# Patient Record
Sex: MALE | Race: WHITE | URBAN - METROPOLITAN AREA
[De-identification: names, ages, dates, MRNs, and addresses within clinical notes are randomized per-mention and may not be internally consistent; named-entity substitution may affect disease eponyms.]

---

## 2017-12-18 ENCOUNTER — OUTPATIENT (OUTPATIENT)
Dept: OUTPATIENT SERVICES | Facility: HOSPITAL | Age: 27
LOS: 1 days | Discharge: HOME | End: 2017-12-18

## 2017-12-18 DIAGNOSIS — J36 PERITONSILLAR ABSCESS: ICD-10-CM

## 2018-03-11 ENCOUNTER — EMERGENCY (EMERGENCY)
Facility: HOSPITAL | Age: 28
LOS: 0 days | Discharge: HOME | End: 2018-03-11
Attending: STUDENT IN AN ORGANIZED HEALTH CARE EDUCATION/TRAINING PROGRAM

## 2018-03-11 VITALS
SYSTOLIC BLOOD PRESSURE: 140 MMHG | RESPIRATION RATE: 18 BRPM | HEIGHT: 67 IN | DIASTOLIC BLOOD PRESSURE: 85 MMHG | OXYGEN SATURATION: 99 % | WEIGHT: 315 LBS | TEMPERATURE: 97 F | HEART RATE: 81 BPM

## 2018-03-11 DIAGNOSIS — F12.10 CANNABIS ABUSE, UNCOMPLICATED: ICD-10-CM

## 2018-03-11 DIAGNOSIS — F41.9 ANXIETY DISORDER, UNSPECIFIED: ICD-10-CM

## 2018-03-11 DIAGNOSIS — F17.200 NICOTINE DEPENDENCE, UNSPECIFIED, UNCOMPLICATED: ICD-10-CM

## 2018-03-11 RX ORDER — ALPRAZOLAM 0.25 MG
0.5 TABLET ORAL ONCE
Qty: 0 | Refills: 0 | Status: DISCONTINUED | OUTPATIENT
Start: 2018-03-11 | End: 2018-03-11

## 2018-03-11 RX ADMIN — Medication 0.5 MILLIGRAM(S): at 11:53

## 2018-03-11 NOTE — ED BEHAVIORAL HEALTH ASSESSMENT NOTE - RISK ASSESSMENT
Pt denies any suicidal or homicidal ideation, plan or intent to die and denies any history of suicidal or homicidal risk behavior in the past or present.

## 2018-03-11 NOTE — ED BEHAVIORAL HEALTH ASSESSMENT NOTE - DESCRIPTION
Pt was seen and evaluated by ER MD and upon medical clearance was referred for psychiatric consultation.  Pt has been cooperative and calm since his arrival at ER up to present time.  Pt states that his mother does not like him smoking cannabis and  they had an argument over it today and called 911 and reported him suicidal.  Pt stated that "She had done this in the past. " Pt denies any severe depression, hopelessness or helplessness.  Pt currently denies any suicidal or homicidal ideation, plan or intent to die. He denies any history of suicidal or homicidal risk behavior in the past or present. 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  Pt states that after he gets his belongings, he is going to stay with his friend, since his mother always has an argument with him over smoking cannabis.  He plans to eventually live with his father.  Pt states that he is going to consider referral to [facility] for substance abuse treatment. Mother Niki Martinez tel. no. [phone], who was outside of the ER with boyfriend provided collateral information and confirmed history gathered above.  Mother confirmed that pt has long history of cannabis abuse and does not want to go for any treatment.  She reported that every time they have argument over pt's cannabis, pt becomes threatening to hurt himself but she confirms that pt has never tried to hurt himself or others.  Mother feels safe for pt to be discharged since he is not going to stay with her anymore and agreeable for pt to be referred to [facility] for further evaluation and continuing follow-up care.  Mother was informed that she could inquire about applying for "Parents in need of assistance" petition at the family court to have pt get treatment for his substance abuse if he is not compliant with [facility] referral for continuing follow-up care. Patient does not pose any substantial risk of harm to self or others at this time and is psychiatrically cleared for discharge and referred to [facility] for further evaluation and continuing follow-up care. 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  Pt currently lives with mother but after he gets his belongings from mother's house. he is going to stay with his friend and eventually with his father when he comes back from [place].

## 2018-03-11 NOTE — ED PROVIDER NOTE - OBJECTIVE STATEMENT
26 y/o M with hx of marijuana abuse presents via EMS after confrontation with his mom. Patient reports getting into an argument with his mom because he smoked marijuana inside the house. Mom called 911 because patient took knife in his hands and threatened to cut his veins. Patient denies any suicidal or homicidal ideations. No fevers, chills. No nausea, vomiting.

## 2018-03-11 NOTE — ED BEHAVIORAL HEALTH ASSESSMENT NOTE - OTHER
Pt's has arguments with mother over his cannabis use. Return to ER PRN Mother Niki Martinez argument with mother over cannabis abuse

## 2018-03-11 NOTE — ED BEHAVIORAL HEALTH ASSESSMENT NOTE - NS ED BHA ALCOHOL
DASH Diet: Care Instructions  Your Care Instructions  The DASH diet is an eating plan that can help lower your blood pressure. DASH stands for Dietary Approaches to Stop Hypertension. Hypertension is high blood pressure. The DASH diet focuses on eating foods that are high in calcium, potassium, and magnesium. These nutrients can lower blood pressure. The foods that are highest in these nutrients are fruits, vegetables, low-fat dairy products, nuts, seeds, and legumes. But taking calcium, potassium, and magnesium supplements instead of eating foods that are high in those nutrients does not have the same effect. The DASH diet also includes whole grains, fish, and poultry. The DASH diet is one of several lifestyle changes your doctor may recommend to lower your high blood pressure. Your doctor may also want you to decrease the amount of sodium in your diet. Lowering sodium while following the DASH diet can lower blood pressure even further than just the DASH diet alone. Follow-up care is a key part of your treatment and safety. Be sure to make and go to all appointments, and call your doctor if you are having problems. It's also a good idea to know your test results and keep a list of the medicines you take. How can you care for yourself at home? Following the DASH diet  · Eat 4 to 5 servings of fruit each day. A serving is 1 medium-sized piece of fruit, ½ cup chopped or canned fruit, 1/4 cup dried fruit, or 4 ounces (½ cup) of fruit juice. Choose fruit more often than fruit juice. · Eat 4 to 5 servings of vegetables each day. A serving is 1 cup of lettuce or raw leafy vegetables, ½ cup of chopped or cooked vegetables, or 4 ounces (½ cup) of vegetable juice. Choose vegetables more often than vegetable juice. · Get 2 to 3 servings of low-fat and fat-free dairy each day. A serving is 8 ounces of milk, 1 cup of yogurt, or 1 ½ ounces of cheese. · Eat 6 to 8 servings of grains each day.  A serving is 1 slice of bread, 1 ounce of dry cereal, or ½ cup of cooked rice, pasta, or cooked cereal. Try to choose whole-grain products as much as possible. · Limit lean meat, poultry, and fish to 2 servings each day. A serving is 3 ounces, about the size of a deck of cards. · Eat 4 to 5 servings of nuts, seeds, and legumes (cooked dried beans, lentils, and split peas) each week. A serving is 1/3 cup of nuts, 2 tablespoons of seeds, or ½ cup of cooked beans or peas. · Limit fats and oils to 2 to 3 servings each day. A serving is 1 teaspoon of vegetable oil or 2 tablespoons of salad dressing. · Limit sweets and added sugars to 5 servings or less a week. A serving is 1 tablespoon jelly or jam, ½ cup sorbet, or 1 cup of lemonade. · Eat less than 2,300 milligrams (mg) of sodium a day. If you limit your sodium to 1,500 mg a day, you can lower your blood pressure even more. Tips for success  · Start small. Do not try to make dramatic changes to your diet all at once. You might feel that you are missing out on your favorite foods and then be more likely to not follow the plan. Make small changes, and stick with them. Once those changes become habit, add a few more changes. · Try some of the following:  ¨ Make it a goal to eat a fruit or vegetable at every meal and at snacks. This will make it easy to get the recommended amount of fruits and vegetables each day. ¨ Try yogurt topped with fruit and nuts for a snack or healthy dessert. ¨ Add lettuce, tomato, cucumber, and onion to sandwiches. ¨ Combine a ready-made pizza crust with low-fat mozzarella cheese and lots of vegetable toppings. Try using tomatoes, squash, spinach, broccoli, carrots, cauliflower, and onions. ¨ Have a variety of cut-up vegetables with a low-fat dip as an appetizer instead of chips and dip. ¨ Sprinkle sunflower seeds or chopped almonds over salads. Or try adding chopped walnuts or almonds to cooked vegetables. ¨ Try some vegetarian meals using beans and peas. Add garbanzo or kidney beans to salads. Make burritos and tacos with mashed torres beans or black beans. Where can you learn more? Go to http://casey-mehrdad.info/. Enter V558 in the search box to learn more about \"DASH Diet: Care Instructions. \"  Current as of: March 23, 2016  Content Version: 11.1  © 8578-7393 [employer]. Care instructions adapted under license by [employer] (which disclaims liability or warranty for this information). If you have questions about a medical condition or this instruction, always ask your healthcare professional. [place] [address] any warranty or liability for your use of this information. High Cholesterol: Care Instructions  Your Care Instructions  Cholesterol is a type of fat in your blood. It is needed for many body functions, such as making new cells. Cholesterol is made by your body. It also comes from food you eat. High cholesterol means that you have too much of the fat in your blood. This raises your risk of a heart attack and stroke. LDL and HDL are part of your total cholesterol. LDL is the \"bad\" cholesterol. High LDL can raise your risk for heart disease, heart attack, and stroke. HDL is the \"good\" cholesterol. It helps clear bad cholesterol from the body. High HDL is linked with a lower risk of heart disease, heart attack, and stroke. Your cholesterol levels help your doctor find out your risk for having a heart attack or stroke. You and your doctor can talk about whether you need to lower your risk and what treatment is best for you. A heart-healthy lifestyle along with medicines can help lower your cholesterol and your risk. The way you choose to lower your risk will depend on how high your risk is for heart attack and stroke. It will also depend on how you feel about taking medicines. Follow-up care is a key part of your treatment and safety.  Be sure to make and go to all appointments, and call your doctor if you are having problems. It's also a good idea to know your test results and keep a list of the medicines you take. How can you care for yourself at home? · Eat a variety of foods every day. Good choices include fruits, vegetables, whole grains (like oatmeal), dried beans and peas, nuts and seeds, soy products (like tofu), and fat-free or low-fat dairy products. · Replace butter, margarine, and hydrogenated or partially hydrogenated oils with olive and canola oils. (Canola oil margarine without trans fat is fine.)  · Replace red meat with fish, poultry, and soy protein (like tofu). · Limit processed and packaged foods like chips, crackers, and cookies. · Bake, broil, or steam foods. Don't moore them. · Be physically active. Get at least 30 minutes of exercise on most days of the week. Walking is a good choice. You also may want to do other activities, such as running, swimming, cycling, or playing tennis or team sports. · Stay at a healthy weight or lose weight by making the changes in eating and physical activity listed above. Losing just a small amount of weight, even 5 to 10 pounds, can reduce your risk for having a heart attack or stroke. · Do not smoke. When should you call for help? Watch closely for changes in your health, and be sure to contact your doctor if:  · You need help making lifestyle changes. · You have questions about your medicine. Where can you learn more? Go to http://casey-mehrdad.info/. Enter F096 in the search box to learn more about \"High Cholesterol: Care Instructions. \"  Current as of: January 27, 2016  Content Version: 11.1  © 0642-2668 [employer]. Care instructions adapted under license by [employer] (which disclaims liability or warranty for this information).  If you have questions about a medical condition or this instruction, always ask your healthcare professional. [employer] disclaims any warranty or liability for your use of this information. High Blood Pressure: Care Instructions  Your Care Instructions  If your blood pressure is usually above 140/90, you have high blood pressure, or hypertension. That means the top number is 140 or higher or the bottom number is 90 or higher, or both. Despite what a lot of people think, high blood pressure usually doesn't cause headaches or make you feel dizzy or lightheaded. It usually has no symptoms. But it does increase your risk for heart attack, stroke, and kidney or eye damage. The higher your blood pressure, the more your risk increases. Your doctor will give you a goal for your blood pressure. Your goal will be based on your health and your age. An example of a goal is to keep your blood pressure below 140/90. Lifestyle changes, such as eating healthy and being active, are always important to help lower blood pressure. You might also take medicine to reach your blood pressure goal.  Follow-up care is a key part of your treatment and safety. Be sure to make and go to all appointments, and call your doctor if you are having problems. It's also a good idea to know your test results and keep a list of the medicines you take. How can you care for yourself at home? Medical treatment  · If you stop taking your medicine, your blood pressure will go back up. You may take one or more types of medicine to lower your blood pressure. Be safe with medicines. Take your medicine exactly as prescribed. Call your doctor if you think you are having a problem with your medicine. · Talk to your doctor before you start taking aspirin every day. Aspirin can help certain people lower their risk of a heart attack or stroke. But taking aspirin isn't right for everyone, because it can cause serious bleeding. · See your doctor regularly. You may need to see the doctor more often at first or until your blood pressure comes down.   · If you are taking blood pressure medicine, talk to your doctor before you take decongestants or anti-inflammatory medicine, such as ibuprofen. Some of these medicines can raise blood pressure. · Learn how to check your blood pressure at home. Lifestyle changes  · Stay at a healthy weight. This is especially important if you put on weight around the waist. Losing even 10 pounds can help you lower your blood pressure. · If your doctor recommends it, get more exercise. Walking is a good choice. Bit by bit, increase the amount you walk every day. Try for at least 30 minutes on most days of the week. You also may want to swim, bike, or do other activities. · Avoid or limit alcohol. Talk to your doctor about whether you can drink any alcohol. · Try to limit how much sodium you eat to less than 2,300 milligrams (mg) a day. Your doctor may ask you to try to eat less than 1,500 mg a day. · Eat plenty of fruits (such as bananas and oranges), vegetables, legumes, whole grains, and low-fat dairy products. · Lower the amount of saturated fat in your diet. Saturated fat is found in animal products such as milk, cheese, and meat. Limiting these foods may help you lose weight and also lower your risk for heart disease. · Do not smoke. Smoking increases your risk for heart attack and stroke. If you need help quitting, talk to your doctor about stop-smoking programs and medicines. These can increase your chances of quitting for good. When should you call for help? Call 911 anytime you think you may need emergency care. This may mean having symptoms that suggest that your blood pressure is causing a serious heart or blood vessel problem. Your blood pressure may be over 180/110. For example, call 911 if:  · You have symptoms of a heart attack. These may include:  ¨ Chest pain or pressure, or a strange feeling in the chest.  ¨ Sweating. ¨ Shortness of breath. ¨ Nausea or vomiting.   ¨ Pain, pressure, or a strange feeling in the back, neck, jaw, or upper belly or in one or both shoulders or arms. ¨ Lightheadedness or sudden weakness. ¨ A fast or irregular heartbeat. · You have symptoms of a stroke. These may include:  ¨ Sudden numbness, tingling, weakness, or loss of movement in your face, arm, or leg, especially on only one side of your body. ¨ Sudden vision changes. ¨ Sudden trouble speaking. ¨ Sudden confusion or trouble understanding simple statements. ¨ Sudden problems with walking or balance. ¨ A sudden, severe headache that is different from past headaches. · You have severe back or belly pain. Do not wait until your blood pressure comes down on its own. Get help right away. Call your doctor now or seek immediate care if:  · Your blood pressure is much higher than normal (such as 180/110 or higher), but you don't have symptoms. · You think high blood pressure is causing symptoms, such as:  ¨ Severe headache. ¨ Blurry vision. Watch closely for changes in your health, and be sure to contact your doctor if:  · Your blood pressure measures 140/90 or higher at least 2 times. That means the top number is 140 or higher or the bottom number is 90 or higher, or both. · You think you may be having side effects from your blood pressure medicine. · Your blood pressure is usually normal, but it goes above normal at least 2 times. Where can you learn more? Go to http://casey-mehrdad.info/. Enter Y651 in the search box to learn more about \"High Blood Pressure: Care Instructions. \"  Current as of: August 8, 2016  Content Version: 11.1  © 7312-8572 [employer]. Care instructions adapted under license by [employer] (which disclaims liability or warranty for this information). If you have questions about a medical condition or this instruction, always ask your healthcare professional. [place] [address] any warranty or liability for your use of this information.        Middle Ear Fluid: Care Instructions  Your Care Instructions    Fluid often builds up inside the ear during a cold or allergies. Usually the fluid drains away, but sometimes a small tube in the ear, called the eustachian tube, stays blocked for months. Symptoms of fluid buildup may include:  · Popping, ringing, or a feeling of fullness or pressure in the ear. · Trouble hearing. · Balance problems and dizziness. In most cases, you can treat yourself at home. Follow-up care is a key part of your treatment and safety. Be sure to make and go to all appointments, and call your doctor if you are having problems. It's also a good idea to know your test results and keep a list of the medicines you take. How can you care for yourself at home? · In most cases, the fluid clears up within a few months without treatment. You may need more tests if the fluid does not clear up after 3 months. · If your doctor prescribed antibiotics, take them as directed. Do not stop taking them just because you feel better. You need to take the full course of antibiotics. When should you call for help? Watch closely for changes in your health, and be sure to contact your doctor if:  · You have pain or a fever. · You have any new symptoms, such as hearing problems. · You do not get better as expected. Where can you learn more? Go to http://casey-mehrdad.info/. Enter B943 in the search box to learn more about \"Middle Ear Fluid: Care Instructions. \"  Current as of: July 29, 2016  Content Version: 11.1  © 2900-4206 [employer]wise[employer] Incorporated. Care instructions adapted under license by [employer] (which disclaims liability or warranty for this information). If you have questions about a medical condition or this instruction, always ask your healthcare professional. [address] any warranty or liability for your use of this information. None known

## 2018-03-11 NOTE — ED BEHAVIORAL HEALTH ASSESSMENT NOTE - HPI (INCLUDE ILLNESS QUALITY, SEVERITY, DURATION, TIMING, CONTEXT, MODIFYING FACTORS, ASSOCIATED SIGNS AND SYMPTOMS)
Patient is  28 y/o White male who denies any history of psychiatric disorder, treatment or hospitalization but with hx of cannabis  abuse since age 16 y/o consuming 1 to 3 joints per day up to present time and never went for detox or rehab.  Patient reports getting into an argument with his mother after he claims he smoked cannabis outside their house and mother smelled it through the windows opened and believed he smoked cannabis inside the house.  they had a heated argument and mother called 911 and alleged that patient took knife in his hands and threatened to cut himself.  Pt was BIBA to ER and he was subsequently referred for psychiatric consultation.

## 2018-03-11 NOTE — ED PROVIDER NOTE - MEDICAL DECISION MAKING DETAILS
I personally evaluated the patient. I reviewed the Resident’s or Physician Assistant’s note (as assigned above), and agree with the findings and plan except as documented in my note. 28yo M PMH anxiety BIBA for eval SI. Mother called EMS and [employer] for concern of SI. Pt denies SI/HI, AVH, no somatic complaints. PE: pt anxious but cooperative. Will call psych, 1:1, reassess

## 2018-03-11 NOTE — ED BEHAVIORAL HEALTH ASSESSMENT NOTE - AXIS IV
Other psychosocial and environmental problems/Problem related to social environment/Problems with primary support

## 2018-03-11 NOTE — ED BEHAVIORAL HEALTH ASSESSMENT NOTE - OTHER PAST PSYCHIATRIC HISTORY (INCLUDE DETAILS REGARDING ONSET, COURSE OF ILLNESS, INPATIENT/OUTPATIENT TREATMENT)
Pt denies any psychiatric disorder treatment or hospitalizations and admits to long history of Cannabis abuse since age 16 y/o and never went for detox or rehab.

## 2018-03-11 NOTE — ED BEHAVIORAL HEALTH ASSESSMENT NOTE - SUMMARY
Pt is a 26 y/o White male denies any history of psychiatric disorder, treatment or hospitalization but admits history of cannabis  abuse since age 18 y/o consuming 1 to 3 joints per day up to present time and never went for detox or rehab, who was brought to ER today after an argument with mother over his smoking cannabis and mother alleged that pt exhibited suicidal gesture. Pt currently admits to his cannabis abuse but denies any severe depression, hopelessness or helplessness and denies any suicidal or homicidal ideation, plan or intent to die as well as denies any history of suicidal or homicidal risk behavior in the past or present. He has reasons to live and future oriented as well as identify his father with whom he plans to live with as his support system. Pt does not pose any substantial risk of harm to self or others at this time and was psychiatrically cleared for discharged with referral to [facility] for substance abuse treatment and continuing follow-up care.

## 2018-03-11 NOTE — ED BEHAVIORAL HEALTH ASSESSMENT NOTE - PATIENT'S CHIEF COMPLAINT
Pt states, " I had an argument with my mother and she called 911 and reported me suicidal but I'm not."

## 2018-06-29 ENCOUNTER — INPATIENT (INPATIENT)
Facility: HOSPITAL | Age: 28
LOS: 2 days | Discharge: LEFT BEFORE DISCHARGE | End: 2018-07-02
Attending: INTERNAL MEDICINE | Admitting: INTERNAL MEDICINE

## 2018-06-29 VITALS
TEMPERATURE: 97 F | OXYGEN SATURATION: 99 % | WEIGHT: 179.9 LBS | HEIGHT: 71 IN | RESPIRATION RATE: 18 BRPM | DIASTOLIC BLOOD PRESSURE: 77 MMHG | HEART RATE: 88 BPM | SYSTOLIC BLOOD PRESSURE: 145 MMHG

## 2018-06-29 NOTE — ED PROVIDER NOTE - MEDICAL DECISION MAKING DETAILS
27m w requesting detox. reporting mild symptoms of withdrawal at this time. Librium given. Labs, EKG, & imaging reviewed. Patient admitted to Detox for further care and management.

## 2018-06-29 NOTE — ED PROVIDER NOTE - PHYSICAL EXAMINATION
Physical Exam  General: Awake, alert, NAD, WDWN, NCAT, no skull/facial tender, no step-offs, no raccoon/mathis, non-toxic appearing  Eyes: PERRL, EOMI, no icterus, lids and conjunctivae are normal  ENT: External inspection normal, pink/moist membranes, pharynx normal  CV: S1S2, regular rate and rhythm, no murmur/gallops/rubs, no JVD, 2+ pulses b/l, no edema/cords/homans, warm/well-perfused  Respiratory: Normal respiratory rate/effort, no respiratory distress, normal voice, speaking full sentences, lungs clear to auscultation b/l, no wheezing/rales/rhonchi, no retractions, no stridor  Abdomen: Soft abdomen, no tender/distended/guarding/rebound, no CVA tender  Musculoskeletal: FROM all 4 extremities, N/V intact, pelvis stable, no TLS spinal tender/deform/step-offs, no orville tender/deform, normal motor tone, stable gait  Neck: FROM neck, supple, no meningismus, trachea midline, no JVD, no cspine tender/step-offs  Integumentary: Color normal for race, warm and dry, no rash  Neuro: Oriented x3, CN 2-12 intact, normal motor, normal sensory, normal gait, normal cerebellar, no tremors, no clonus/rigidity/hyper-reflexia.  Psych: Oriented x3, mood normal, affect normal. No SI/HI, no AV hallucinations.

## 2018-06-29 NOTE — ED PROVIDER NOTE - OBJECTIVE STATEMENT
27m w a hx of ADHD, anxiety, depression, asthma, & EtOH abuse presents requesting Detox eval. Pt admits to drinking daily w last drink a few hours ago and no prior admit to detox. Pt denies any fall/trauma. Patient denies SI/HI, denies any self-harm attempt or OD, also reports abuse/misuse of marijuana and "yesica". Reports mild tremors. Denies seizures or AV hallucinations. Pt reports no other symptoms at this time.

## 2018-06-29 NOTE — ED PROVIDER NOTE - NS ED ROS FT
Review of Systems  Constitutional:  No fever or chills.   Eyes:  Negative.   ENMT:  No nasal congestion, discharge, or throat pain.   Cardiac:  No chest pain, palpitations, syncope, or edema.  Respiratory:  No dyspnea, wheezing, or cough.   GI:  No vomiting, diarrhea, or abdominal pain. No melena or hematochezia.  :  No dysuria or hematuria.   Musculoskeletal:  No gait abnormality, joint swelling, joint pain, or back pain.  Skin:  No skin rash, jaundice, or lesions.  Neuro:  No headache, loss of sensation, or focal weakness. Mild tremors. No AV hallucinations.

## 2018-06-29 NOTE — ED PROVIDER NOTE - CARE PLAN
Principal Discharge DX:	Alcohol abuse Principal Discharge DX:	Alcohol abuse  Assessment and plan of treatment:	27m w requesting detox. nontoxic appearing, n/v intact. no trauma. reporting mild symptoms of withdrawal at this time. Will check detox bed availability, benzodiazepines as needed, observe/re-assess.

## 2018-06-29 NOTE — ED PROVIDER NOTE - PLAN OF CARE
27m w requesting detox. nontoxic appearing, n/v intact. no trauma. reporting mild symptoms of withdrawal at this time. Will check detox bed availability, benzodiazepines as needed, observe/re-assess.

## 2018-06-30 DIAGNOSIS — F41.9 ANXIETY DISORDER, UNSPECIFIED: ICD-10-CM

## 2018-06-30 DIAGNOSIS — Z94.7 CORNEAL TRANSPLANT STATUS: Chronic | ICD-10-CM

## 2018-06-30 DIAGNOSIS — F10.10 ALCOHOL ABUSE, UNCOMPLICATED: ICD-10-CM

## 2018-06-30 LAB
ALBUMIN SERPL ELPH-MCNC: 4.7 G/DL — SIGNIFICANT CHANGE UP (ref 3.5–5.2)
ALP SERPL-CCNC: 70 U/L — SIGNIFICANT CHANGE UP (ref 30–115)
ALT FLD-CCNC: 23 U/L — SIGNIFICANT CHANGE UP (ref 0–41)
AMPHET UR-MCNC: NEGATIVE — SIGNIFICANT CHANGE UP
AMPHET UR-MCNC: NEGATIVE — SIGNIFICANT CHANGE UP
ANION GAP SERPL CALC-SCNC: 17 MMOL/L — HIGH (ref 7–14)
APAP SERPL-MCNC: <5 UG/ML — LOW (ref 10–30)
APPEARANCE UR: CLEAR — SIGNIFICANT CHANGE UP
APTT BLD: 30.5 SEC — SIGNIFICANT CHANGE UP (ref 27–39.2)
AST SERPL-CCNC: 23 U/L — SIGNIFICANT CHANGE UP (ref 0–41)
BARBITURATES UR SCN-MCNC: NEGATIVE — SIGNIFICANT CHANGE UP
BARBITURATES UR SCN-MCNC: NEGATIVE — SIGNIFICANT CHANGE UP
BASOPHILS # BLD AUTO: 0.08 K/UL — SIGNIFICANT CHANGE UP (ref 0–0.2)
BASOPHILS NFR BLD AUTO: 0.7 % — SIGNIFICANT CHANGE UP (ref 0–1)
BENZODIAZ UR-MCNC: NEGATIVE — SIGNIFICANT CHANGE UP
BENZODIAZ UR-MCNC: NEGATIVE — SIGNIFICANT CHANGE UP
BILIRUB SERPL-MCNC: 0.8 MG/DL — SIGNIFICANT CHANGE UP (ref 0.2–1.2)
BILIRUB UR-MCNC: NEGATIVE — SIGNIFICANT CHANGE UP
BUN SERPL-MCNC: 12 MG/DL — SIGNIFICANT CHANGE UP (ref 10–20)
CALCIUM SERPL-MCNC: 10 MG/DL — SIGNIFICANT CHANGE UP (ref 8.5–10.1)
CHLORIDE SERPL-SCNC: 98 MMOL/L — SIGNIFICANT CHANGE UP (ref 98–110)
CO2 SERPL-SCNC: 26 MMOL/L — SIGNIFICANT CHANGE UP (ref 17–32)
COCAINE METAB.OTHER UR-MCNC: NEGATIVE — SIGNIFICANT CHANGE UP
COCAINE METAB.OTHER UR-MCNC: NEGATIVE — SIGNIFICANT CHANGE UP
COLOR SPEC: YELLOW — SIGNIFICANT CHANGE UP
CREAT SERPL-MCNC: 1 MG/DL — SIGNIFICANT CHANGE UP (ref 0.7–1.5)
DIFF PNL FLD: NEGATIVE — SIGNIFICANT CHANGE UP
DRUG SCREEN 1, URINE RESULT: SIGNIFICANT CHANGE UP
DRUG SCREEN 1, URINE RESULT: SIGNIFICANT CHANGE UP
EOSINOPHIL # BLD AUTO: 0.35 K/UL — SIGNIFICANT CHANGE UP (ref 0–0.7)
EOSINOPHIL NFR BLD AUTO: 2.9 % — SIGNIFICANT CHANGE UP (ref 0–8)
ETHANOL SERPL-MCNC: <10 MG/DL — HIGH
GLUCOSE SERPL-MCNC: 111 MG/DL — HIGH (ref 70–99)
GLUCOSE UR QL: NEGATIVE MG/DL — SIGNIFICANT CHANGE UP
HAV IGM SER-ACNC: SIGNIFICANT CHANGE UP
HBV CORE IGM SER-ACNC: SIGNIFICANT CHANGE UP
HBV SURFACE AG SER-ACNC: SIGNIFICANT CHANGE UP
HCT VFR BLD CALC: 46.1 % — SIGNIFICANT CHANGE UP (ref 42–52)
HCV AB S/CO SERPL IA: 0.16 S/CO — SIGNIFICANT CHANGE UP
HCV AB SERPL-IMP: SIGNIFICANT CHANGE UP
HGB BLD-MCNC: 16.2 G/DL — SIGNIFICANT CHANGE UP (ref 14–18)
IMM GRANULOCYTES NFR BLD AUTO: 0.3 % — SIGNIFICANT CHANGE UP (ref 0.1–0.3)
INR BLD: 1 RATIO — SIGNIFICANT CHANGE UP (ref 0.65–1.3)
KETONES UR-MCNC: NEGATIVE — SIGNIFICANT CHANGE UP
LEUKOCYTE ESTERASE UR-ACNC: NEGATIVE — SIGNIFICANT CHANGE UP
LYMPHOCYTES # BLD AUTO: 2.75 K/UL — SIGNIFICANT CHANGE UP (ref 1.2–3.4)
LYMPHOCYTES # BLD AUTO: 23.1 % — SIGNIFICANT CHANGE UP (ref 20.5–51.1)
MAGNESIUM SERPL-MCNC: 2.2 MG/DL — SIGNIFICANT CHANGE UP (ref 1.8–2.4)
MCHC RBC-ENTMCNC: 31.2 PG — HIGH (ref 27–31)
MCHC RBC-ENTMCNC: 35.1 G/DL — SIGNIFICANT CHANGE UP (ref 32–37)
MCV RBC AUTO: 88.8 FL — SIGNIFICANT CHANGE UP (ref 80–94)
METHADONE UR-MCNC: NEGATIVE — SIGNIFICANT CHANGE UP
METHADONE UR-MCNC: NEGATIVE — SIGNIFICANT CHANGE UP
MONOCYTES # BLD AUTO: 1.07 K/UL — HIGH (ref 0.1–0.6)
MONOCYTES NFR BLD AUTO: 9 % — SIGNIFICANT CHANGE UP (ref 1.7–9.3)
NEUTROPHILS # BLD AUTO: 7.59 K/UL — HIGH (ref 1.4–6.5)
NEUTROPHILS NFR BLD AUTO: 64 % — SIGNIFICANT CHANGE UP (ref 42.2–75.2)
NITRITE UR-MCNC: NEGATIVE — SIGNIFICANT CHANGE UP
NRBC # BLD: 0 /100 WBCS — SIGNIFICANT CHANGE UP (ref 0–0)
OPIATES UR-MCNC: NEGATIVE — SIGNIFICANT CHANGE UP
OPIATES UR-MCNC: NEGATIVE — SIGNIFICANT CHANGE UP
PCP UR-MCNC: NEGATIVE — SIGNIFICANT CHANGE UP
PCP UR-MCNC: NEGATIVE — SIGNIFICANT CHANGE UP
PH UR: 7 — SIGNIFICANT CHANGE UP (ref 5–8)
PLATELET # BLD AUTO: 317 K/UL — SIGNIFICANT CHANGE UP (ref 130–400)
POTASSIUM SERPL-MCNC: 3.8 MMOL/L — SIGNIFICANT CHANGE UP (ref 3.5–5)
POTASSIUM SERPL-SCNC: 3.8 MMOL/L — SIGNIFICANT CHANGE UP (ref 3.5–5)
PROPOXYPHENE QUALITATIVE URINE RESULT: NEGATIVE — SIGNIFICANT CHANGE UP
PROPOXYPHENE QUALITATIVE URINE RESULT: NEGATIVE — SIGNIFICANT CHANGE UP
PROT SERPL-MCNC: 7.6 G/DL — SIGNIFICANT CHANGE UP (ref 6–8)
PROT UR-MCNC: NEGATIVE MG/DL — SIGNIFICANT CHANGE UP
PROTHROM AB SERPL-ACNC: 10.8 SEC — SIGNIFICANT CHANGE UP (ref 9.95–12.87)
RBC # BLD: 5.19 M/UL — SIGNIFICANT CHANGE UP (ref 4.7–6.1)
RBC # FLD: 12 % — SIGNIFICANT CHANGE UP (ref 11.5–14.5)
SALICYLATES SERPL-MCNC: <0.3 MG/DL — LOW (ref 4–30)
SODIUM SERPL-SCNC: 141 MMOL/L — SIGNIFICANT CHANGE UP (ref 135–146)
SP GR SPEC: 1.02 — SIGNIFICANT CHANGE UP (ref 1.01–1.03)
THC UR QL: POSITIVE
THC UR QL: POSITIVE
UROBILINOGEN FLD QL: 0.2 MG/DL — SIGNIFICANT CHANGE UP (ref 0.2–0.2)
WBC # BLD: 11.88 K/UL — HIGH (ref 4.8–10.8)
WBC # FLD AUTO: 11.88 K/UL — HIGH (ref 4.8–10.8)

## 2018-06-30 RX ORDER — NICOTINE POLACRILEX 2 MG
1 GUM BUCCAL DAILY
Qty: 0 | Refills: 0 | Status: DISCONTINUED | OUTPATIENT
Start: 2018-06-30 | End: 2018-07-02

## 2018-06-30 RX ORDER — HYDROXYZINE HCL 10 MG
50 TABLET ORAL EVERY 6 HOURS
Qty: 0 | Refills: 0 | Status: DISCONTINUED | OUTPATIENT
Start: 2018-06-30 | End: 2018-07-02

## 2018-06-30 RX ORDER — HYDROXYZINE HCL 10 MG
100 TABLET ORAL AT BEDTIME
Qty: 0 | Refills: 0 | Status: DISCONTINUED | OUTPATIENT
Start: 2018-06-30 | End: 2018-07-02

## 2018-06-30 RX ORDER — THIAMINE MONONITRATE (VIT B1) 100 MG
100 TABLET ORAL DAILY
Qty: 0 | Refills: 0 | Status: DISCONTINUED | OUTPATIENT
Start: 2018-06-30 | End: 2018-07-02

## 2018-06-30 RX ORDER — TUBERCULIN PURIFIED PROTEIN DERIVATIVE 5 [IU]/.1ML
5 INJECTION, SOLUTION INTRADERMAL ONCE
Qty: 0 | Refills: 0 | Status: COMPLETED | OUTPATIENT
Start: 2018-06-30 | End: 2018-06-30

## 2018-06-30 RX ORDER — ACETAMINOPHEN 500 MG
650 TABLET ORAL EVERY 6 HOURS
Qty: 0 | Refills: 0 | Status: DISCONTINUED | OUTPATIENT
Start: 2018-06-30 | End: 2018-07-02

## 2018-06-30 RX ORDER — MAGNESIUM HYDROXIDE 400 MG/1
30 TABLET, CHEWABLE ORAL DAILY
Qty: 0 | Refills: 0 | Status: DISCONTINUED | OUTPATIENT
Start: 2018-06-30 | End: 2018-07-02

## 2018-06-30 RX ORDER — IBUPROFEN 200 MG
400 TABLET ORAL EVERY 4 HOURS
Qty: 0 | Refills: 0 | Status: DISCONTINUED | OUTPATIENT
Start: 2018-06-30 | End: 2018-07-02

## 2018-06-30 RX ORDER — FOLIC ACID 0.8 MG
1 TABLET ORAL DAILY
Qty: 0 | Refills: 0 | Status: DISCONTINUED | OUTPATIENT
Start: 2018-06-30 | End: 2018-07-02

## 2018-06-30 RX ADMIN — Medication 1 MILLIGRAM(S): at 09:24

## 2018-06-30 RX ADMIN — Medication 1 TABLET(S): at 09:24

## 2018-06-30 RX ADMIN — Medication 1 PATCH: at 09:24

## 2018-06-30 RX ADMIN — Medication 100 MILLIGRAM(S): at 22:07

## 2018-06-30 RX ADMIN — Medication 0.1 MILLIGRAM(S): at 20:18

## 2018-06-30 RX ADMIN — TUBERCULIN PURIFIED PROTEIN DERIVATIVE 5 UNIT(S): 5 INJECTION, SOLUTION INTRADERMAL at 14:30

## 2018-06-30 RX ADMIN — Medication 100 MILLIGRAM(S): at 09:24

## 2018-06-30 RX ADMIN — Medication 25 MILLIGRAM(S): at 01:59

## 2018-06-30 NOTE — CHART NOTE - NSCHARTNOTEFT_GEN_A_CORE
PPD placed on lfa to be read 48-72hrs..... MARISELAalacios RRT PPD placed on lfa to be read 48-72hrs..... JPalacios RRT    PPD WAS READ 0MM 7/02/18  EB, RRT

## 2018-06-30 NOTE — H&P ADULT - ATTENDING COMMENTS
Patient interviewed and examined.    Chart reviewed.    PA's H&P noted and modified, as appropriate.    Case discussed on team rounds    Following is my summary of the case.    Admitted for detox: from __x__ED, ___Intake, ____Med/Surg Floor    Alcohol__x__   Opioid_____  Benzo___ Other_____    Substance amount, duration of use, last usage, and prior attempts at detox or rehabs, are outlined above in the H&P and discussed with patient.    Associated withdrawal symptoms presents.  Comorbid conditions noted. Chronic and Stable.    Past Medical Hx, Psych Hx, family Hx, Social Hx from H&P reviewed and NO changes.    Old medical record and medication Hx. Reviewed    Following items reviewed and addressed:  1. labs  2. EKG  3. Imaging from PACs module    Examination: no change from PA's exam.    Place on following protocol  _____Medically Managed  __X__Medically Supervised    Ciwa__X___Librium taper____Ativan taper___Methadone taper___ Phenobarb taper____ Suboxone Induction____MMTP____    Narcan Kit Offered    Psych Consult __X__N/A  ___Ordered    Physical Therapy  ___XN/A    ___  Ordered    Aftercare disposition to be addressed by counselors.    Estimated length of stay 3-5 days.

## 2018-06-30 NOTE — H&P ADULT - HISTORY OF PRESENT ILLNESS
· Chief Complaint: The patient is a 27y Male requesting  detox. from alcohol ,liquor 2 pints /day x 2 years, last  used  today, last  detox  4 yrs  ago clean  x 2 yrs  following, denies  other  drug abuse, no h/o ivda, h/o psych do-anxiety do  no s/h ideations, +tremors  ,no h/o seizures	    PAST MEDICAL/SURGICAL/FAMILY/SOCIAL HISTORY:    Past Medical History:  Anxiety.    ALLERGIES AND HOME MEDICATIONS:   Allergies:        Allergies:  	No Known Allergies:

## 2018-06-30 NOTE — H&P ADULT - NSHPLABSRESULTS_GEN_ALL_CORE
16.2   11.88 )-----------( 317      ( 30 Jun 2018 00:45 )             46.1   06-30    141  |  98  |  12  ----------------------------<  111<H>  3.8   |  26  |  1.0    Ca    10.0      30 Jun 2018 00:45  Mg     2.2     06-30    TPro  7.6  /  Alb  4.7  /  TBili  0.8  /  DBili  x   /  AST  23  /  ALT  23  /  AlkPhos  70  06-30

## 2018-07-01 LAB — T PALLIDUM AB TITR SER: NEGATIVE — SIGNIFICANT CHANGE UP

## 2018-07-01 RX ORDER — GABAPENTIN 400 MG/1
300 CAPSULE ORAL THREE TIMES A DAY
Qty: 0 | Refills: 0 | Status: DISCONTINUED | OUTPATIENT
Start: 2018-07-01 | End: 2018-07-02

## 2018-07-01 RX ADMIN — Medication 1 TABLET(S): at 08:40

## 2018-07-01 RX ADMIN — Medication 100 MILLIGRAM(S): at 08:40

## 2018-07-01 RX ADMIN — Medication 100 MILLIGRAM(S): at 23:22

## 2018-07-01 RX ADMIN — GABAPENTIN 300 MILLIGRAM(S): 400 CAPSULE ORAL at 20:57

## 2018-07-01 RX ADMIN — Medication 1 PATCH: at 08:41

## 2018-07-01 RX ADMIN — Medication 1 PATCH: at 08:43

## 2018-07-01 RX ADMIN — GABAPENTIN 300 MILLIGRAM(S): 400 CAPSULE ORAL at 13:10

## 2018-07-01 RX ADMIN — GABAPENTIN 300 MILLIGRAM(S): 400 CAPSULE ORAL at 08:41

## 2018-07-01 RX ADMIN — Medication 1 MILLIGRAM(S): at 08:40

## 2018-07-02 VITALS
RESPIRATION RATE: 16 BRPM | TEMPERATURE: 96 F | DIASTOLIC BLOOD PRESSURE: 89 MMHG | HEART RATE: 50 BPM | SYSTOLIC BLOOD PRESSURE: 128 MMHG

## 2018-07-02 RX ORDER — GABAPENTIN 400 MG/1
1 CAPSULE ORAL
Qty: 42 | Refills: 0 | OUTPATIENT
Start: 2018-07-02 | End: 2018-07-15

## 2018-07-02 RX ORDER — GABAPENTIN 400 MG/1
400 CAPSULE ORAL
Qty: 0 | Refills: 0 | Status: DISCONTINUED | OUTPATIENT
Start: 2018-07-02 | End: 2018-07-02

## 2018-07-02 RX ORDER — GABAPENTIN 400 MG/1
1 CAPSULE ORAL
Qty: 60 | Refills: 0
Start: 2018-07-02

## 2018-07-02 RX ADMIN — Medication 1 MILLIGRAM(S): at 08:53

## 2018-07-02 RX ADMIN — Medication 1 TABLET(S): at 08:53

## 2018-07-02 RX ADMIN — TUBERCULIN PURIFIED PROTEIN DERIVATIVE 5 UNIT(S): 5 INJECTION, SOLUTION INTRADERMAL at 09:44

## 2018-07-02 RX ADMIN — GABAPENTIN 400 MILLIGRAM(S): 400 CAPSULE ORAL at 08:53

## 2018-07-02 RX ADMIN — Medication 1 PATCH: at 08:54

## 2018-07-02 RX ADMIN — Medication 100 MILLIGRAM(S): at 08:53

## 2018-07-02 NOTE — CHART NOTE - NSCHARTNOTEFT_GEN_A_CORE
The patient was admitted to the inpt detox unit CDU, for   ETOH__x__ Opioid___  Benzo____Polysubstance _____ Dependency.    Pt was admitted from ED_x___, Intake____, Med/surg Floor_______.    Details are present in the preceding History & Physical section and follow up chart notes.  patient was evaluated on daily detox team  rounds.  Withdrawal symptoms and signs were reviewed on a daily basis, and the protocols were adjusted accordingly.    Labs and imaging results were reviewed and discussed with the patient.    All questions from the patient were addressed.  The patient was seen by the Chemical dependency counselors, and different options for after care were discussed.  The patient attended groups, meetings and 1:1 sessions with the counselors.  Narcane Kit was offered and instructions given prior to discharge.    Psychiatry consultation reviewed______, N/A__x____    Physical therapy evaluation reviewed_____, N/A__x__    Pt was given copies of labs and imaging reports, if applicable.    Prescriptions if needed, were sent through [facility] system to the pharmacy amnd are noted in the discharge instruction sheet.    After care was arranged by counselors and pt was discharged to:    Home___, Outpt. Program___, Rehab ___, Long term____ [facility] ____ [facility]____ SNF____, AMA_X__, Admin Discharge____    Principal Diagnosis: Alcohol Dependency__X__ Opioid Dependency___ Benzo Dependency____ Polysubstance Dependency____

## 2018-07-02 NOTE — CHART NOTE - NSCHARTNOTEFT_GEN_A_CORE
Subsequent Inpatient Encounter                                       Detox Unit    KIMMIE ARROYO   27y   Male      Chief Complaint:    Follow up for Alcohol  Dependency    HPI:     I reviewed previous notes. No Change, except if noted below.             Detail:_    ROS:   I reviewed with patient.  No changes from previous notes except if noted below.             Detail: _    PFSH I reviewed with patient. No changes from previous notes except if noted below.             Detail_    Medication reconciliation performed.    MEDICATIONS  (STANDING):  folic acid 1 milliGRAM(s) Oral daily  gabapentin 300 milliGRAM(s) Oral three times a day  multivitamin 1 Tablet(s) Oral daily  nicotine - 21 mG/24Hr(s) Patch 1 patch Transdermal daily  thiamine 100 milliGRAM(s) Oral daily      MEDICATIONS  (PRN):  acetaminophen   Tablet 650 milliGRAM(s) Oral every 6 hours PRN For Temp greater than 38 C (100.4 F)  aluminum hydroxide/magnesium hydroxide/simethicone Suspension 30 milliLiter(s) Oral every 4 hours PRN Dyspepsia  chlordiazePOXIDE 50 milliGRAM(s) Oral every 1 hour PRN Alcohol Withdrawal Symptoms  chlordiazePOXIDE 25 milliGRAM(s) Oral every 2 hours PRN Alcohol Withdrawal Symptoms  cloNIDine 0.1 milliGRAM(s) Oral every 8 hours PRN sbp>140  hydrOXYzine hydrochloride 50 milliGRAM(s) Oral every 6 hours PRN Anxiety  hydrOXYzine hydrochloride 100 milliGRAM(s) Oral at bedtime PRN insomnia  ibuprofen  Tablet 400 milliGRAM(s) Oral every 4 hours PRN muscle discomfort  magnesium hydroxide Suspension 30 milliLiter(s) Oral daily PRN Constipation      T(C): 35.8 (18 @ 06:00), Max: 36.6 (18 @ 00:05)  HR: 50 (18 @ 06:00) (50 - 61)  BP: 128/89 (18 @ 06:00) (125/79 - 144/75)  RR: 16 (18 @ 06:00) (16 - 16)  SpO2: --    PHYSICAL EXAM:      Constitutional: NAD, A&O x3    Eyes: PERRLA, no conjuctivitis    Neck: no lymphadenopathy    Respiratory: +air entry, no rales, no rhonchi, no wheezes    Cardiovascular: +S1 and S2, regular rate and rhythm    Gastrointestinal: +BS, soft, non-tender, not distended    Extremities:  no edema, no calf tenderness    Skin: no rashes, normal turgor            Magnesium, Serum: 2.2 mg/dL (18 @ 00:45)  Treponema Pallidum Antibody Interpretation: Negative (18 @ 00:45)  Hepatitis B Surface Antigen: Nonreact (18 @ 00:45)  Hepatitis C Virus S/CO Ratio: 0.16 S/CO (18 @ 00:45)    Hepatitis C Virus Interpretation: Nonreact (18 @ 00:45)      Urinalysis Basic - ( 2018 10:19 )    Color: Yellow / Appearance: Clear / S.020 / pH: x  Gluc: x / Ketone: Negative  / Bili: Negative / Urobili: 0.2 mg/dL   Blood: x / Protein: Negative mg/dL / Nitrite: Negative   Leuk Esterase: Negative / RBC: x / WBC x   Sq Epi: x / Non Sq Epi: x / Bacteria: x    Drug Screen 1, Urine Result: Done (18 @ 10:19)  Drug Screen 1, Urine Result: Done (18 @ 23:30)        Impression and Plan:    Primary Diagnosis:  Alcohol Dependency                                Medication: Librium CIWA Protocol    Secondary Diagnosis: Anxiety                                                 Medication: c/w Gabapentin    Tertiary Diagnosis:                                                                       Medication      Continue Detox Protocols. Use of PRNS as needed for withdrawal and comfort.    Adjustments to protocols:    Labs/ Tests reviewed.    Tests ordered:     Likely Disposition: _x__Home       ___Rehab       ___Outpatient Program    ___Self Help     _____Other    Estimated Length of stay:__3__

## 2018-07-06 DIAGNOSIS — F10.20 ALCOHOL DEPENDENCE, UNCOMPLICATED: ICD-10-CM

## 2018-07-06 DIAGNOSIS — F41.9 ANXIETY DISORDER, UNSPECIFIED: ICD-10-CM

## 2018-07-06 DIAGNOSIS — F17.200 NICOTINE DEPENDENCE, UNSPECIFIED, UNCOMPLICATED: ICD-10-CM

## 2018-07-06 DIAGNOSIS — F12.20 CANNABIS DEPENDENCE, UNCOMPLICATED: ICD-10-CM

## 2018-10-11 ENCOUNTER — EMERGENCY (EMERGENCY)
Facility: HOSPITAL | Age: 28
LOS: 0 days | Discharge: HOME | End: 2018-10-11
Attending: EMERGENCY MEDICINE | Admitting: EMERGENCY MEDICINE

## 2018-10-11 VITALS
TEMPERATURE: 97 F | OXYGEN SATURATION: 100 % | DIASTOLIC BLOOD PRESSURE: 92 MMHG | RESPIRATION RATE: 20 BRPM | HEART RATE: 79 BPM | SYSTOLIC BLOOD PRESSURE: 152 MMHG

## 2018-10-11 VITALS
DIASTOLIC BLOOD PRESSURE: 106 MMHG | OXYGEN SATURATION: 100 % | TEMPERATURE: 97 F | HEART RATE: 74 BPM | WEIGHT: 164.91 LBS | HEIGHT: 67 IN | SYSTOLIC BLOOD PRESSURE: 180 MMHG | RESPIRATION RATE: 18 BRPM

## 2018-10-11 DIAGNOSIS — M54.2 CERVICALGIA: ICD-10-CM
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
## 2018-10-11 DIAGNOSIS — Z79.899 OTHER LONG TERM (CURRENT) DRUG THERAPY: ICD-10-CM

## 2018-10-11 DIAGNOSIS — M54.5 LOW BACK PAIN: ICD-10-CM

## 2018-10-11 DIAGNOSIS — F90.9 ATTENTION-DEFICIT HYPERACTIVITY DISORDER, UNSPECIFIED TYPE: ICD-10-CM
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
## 2018-10-11 DIAGNOSIS — M54.6 PAIN IN THORACIC SPINE: ICD-10-CM

## 2018-10-11 DIAGNOSIS — Z88.0 ALLERGY STATUS TO PENICILLIN: ICD-10-CM

## 2018-10-11 DIAGNOSIS — Z94.7 CORNEAL TRANSPLANT STATUS: Chronic | ICD-10-CM

## 2018-10-11 DIAGNOSIS — J45.909 UNSPECIFIED ASTHMA, UNCOMPLICATED: ICD-10-CM

## 2018-10-11 DIAGNOSIS — Z94.7 CORNEAL TRANSPLANT STATUS: ICD-10-CM
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
## 2018-10-11 LAB
ALBUMIN SERPL ELPH-MCNC: 4.9 G/DL — SIGNIFICANT CHANGE UP (ref 3.5–5.2)
ALLERGY+IMMUNOLOGY DIAG STUDY NOTE: SIGNIFICANT CHANGE UP
ALP SERPL-CCNC: 55 U/L — SIGNIFICANT CHANGE UP (ref 30–115)
ALT FLD-CCNC: 30 U/L — SIGNIFICANT CHANGE UP (ref 0–41)
ANION GAP SERPL CALC-SCNC: 17 MMOL/L — HIGH (ref 7–14)
APPEARANCE UR: CLEAR — SIGNIFICANT CHANGE UP
APTT BLD: 29.6 SEC — SIGNIFICANT CHANGE UP (ref 27–39.2)
AST SERPL-CCNC: 28 U/L — SIGNIFICANT CHANGE UP (ref 0–41)
BASOPHILS # BLD AUTO: 0.09 K/UL — SIGNIFICANT CHANGE UP (ref 0–0.2)
BASOPHILS NFR BLD AUTO: 0.9 % — SIGNIFICANT CHANGE UP (ref 0–1)
BILIRUB SERPL-MCNC: 0.5 MG/DL — SIGNIFICANT CHANGE UP (ref 0.2–1.2)
BILIRUB UR-MCNC: NEGATIVE — SIGNIFICANT CHANGE UP
BUN SERPL-MCNC: 8 MG/DL — LOW (ref 10–20)
CALCIUM SERPL-MCNC: 9.6 MG/DL — SIGNIFICANT CHANGE UP (ref 8.5–10.1)
CHLORIDE SERPL-SCNC: 102 MMOL/L — SIGNIFICANT CHANGE UP (ref 98–110)
CO2 SERPL-SCNC: 24 MMOL/L — SIGNIFICANT CHANGE UP (ref 17–32)
COLOR SPEC: YELLOW — SIGNIFICANT CHANGE UP
CREAT SERPL-MCNC: 0.8 MG/DL — SIGNIFICANT CHANGE UP (ref 0.7–1.5)
DIFF PNL FLD: NEGATIVE — SIGNIFICANT CHANGE UP
EOSINOPHIL # BLD AUTO: 0.52 K/UL — SIGNIFICANT CHANGE UP (ref 0–0.7)
EOSINOPHIL NFR BLD AUTO: 5 % — SIGNIFICANT CHANGE UP (ref 0–8)
EPI CELLS # UR: ABNORMAL /HPF
ETHANOL SERPL-MCNC: <10 MG/DL — HIGH
GLUCOSE SERPL-MCNC: 90 MG/DL — SIGNIFICANT CHANGE UP (ref 70–99)
GLUCOSE UR QL: NEGATIVE MG/DL — SIGNIFICANT CHANGE UP
HCT VFR BLD CALC: 44.5 % — SIGNIFICANT CHANGE UP (ref 42–52)
HGB BLD-MCNC: 15.8 G/DL — SIGNIFICANT CHANGE UP (ref 14–18)
IMM GRANULOCYTES NFR BLD AUTO: 0.5 % — HIGH (ref 0.1–0.3)
INR BLD: 0.95 RATIO — SIGNIFICANT CHANGE UP (ref 0.65–1.3)
KETONES UR-MCNC: NEGATIVE — SIGNIFICANT CHANGE UP
LACTATE BLDV-MCNC: 1.5 MMOL/L — SIGNIFICANT CHANGE UP (ref 0.5–1.6)
LEUKOCYTE ESTERASE UR-ACNC: NEGATIVE — SIGNIFICANT CHANGE UP
LIDOCAIN IGE QN: 127 U/L — HIGH (ref 7–60)
LYMPHOCYTES # BLD AUTO: 2.96 K/UL — SIGNIFICANT CHANGE UP (ref 1.2–3.4)
LYMPHOCYTES # BLD AUTO: 28.3 % — SIGNIFICANT CHANGE UP (ref 20.5–51.1)
MCHC RBC-ENTMCNC: 31.7 PG — HIGH (ref 27–31)
MCHC RBC-ENTMCNC: 35.5 G/DL — SIGNIFICANT CHANGE UP (ref 32–37)
MCV RBC AUTO: 89.4 FL — SIGNIFICANT CHANGE UP (ref 80–94)
MONOCYTES # BLD AUTO: 0.74 K/UL — HIGH (ref 0.1–0.6)
MONOCYTES NFR BLD AUTO: 7.1 % — SIGNIFICANT CHANGE UP (ref 1.7–9.3)
NEUTROPHILS # BLD AUTO: 6.11 K/UL — SIGNIFICANT CHANGE UP (ref 1.4–6.5)
NEUTROPHILS NFR BLD AUTO: 58.2 % — SIGNIFICANT CHANGE UP (ref 42.2–75.2)
NITRITE UR-MCNC: NEGATIVE — SIGNIFICANT CHANGE UP
NRBC # BLD: 0 /100 WBCS — SIGNIFICANT CHANGE UP (ref 0–0)
PH UR: 8.5 — SIGNIFICANT CHANGE UP (ref 5–8)
PLATELET # BLD AUTO: 302 K/UL — SIGNIFICANT CHANGE UP (ref 130–400)
POTASSIUM SERPL-MCNC: 4 MMOL/L — SIGNIFICANT CHANGE UP (ref 3.5–5)
POTASSIUM SERPL-SCNC: 4 MMOL/L — SIGNIFICANT CHANGE UP (ref 3.5–5)
PROT SERPL-MCNC: 7.4 G/DL — SIGNIFICANT CHANGE UP (ref 6–8)
PROT UR-MCNC: ABNORMAL MG/DL
PROTHROM AB SERPL-ACNC: 10.2 SEC — SIGNIFICANT CHANGE UP (ref 9.95–12.87)
RBC # BLD: 4.98 M/UL — SIGNIFICANT CHANGE UP (ref 4.7–6.1)
RBC # FLD: 11.6 % — SIGNIFICANT CHANGE UP (ref 11.5–14.5)
SODIUM SERPL-SCNC: 143 MMOL/L — SIGNIFICANT CHANGE UP (ref 135–146)
SP GR SPEC: 1.01 — SIGNIFICANT CHANGE UP (ref 1.01–1.03)
TYPE + AB SCN PNL BLD: SIGNIFICANT CHANGE UP
UROBILINOGEN FLD QL: 0.2 MG/DL — SIGNIFICANT CHANGE UP (ref 0.2–0.2)
WBC # BLD: 10.47 K/UL — SIGNIFICANT CHANGE UP (ref 4.8–10.8)
WBC # FLD AUTO: 10.47 K/UL — SIGNIFICANT CHANGE UP (ref 4.8–10.8)
WBC UR QL: SIGNIFICANT CHANGE UP /HPF

## 2018-10-11 RX ORDER — SODIUM CHLORIDE 9 MG/ML
1000 INJECTION INTRAMUSCULAR; INTRAVENOUS; SUBCUTANEOUS ONCE
Qty: 0 | Refills: 0 | Status: COMPLETED | OUTPATIENT
Start: 2018-10-11 | End: 2018-10-11

## 2018-10-11 RX ORDER — ONDANSETRON 8 MG/1
4 TABLET, FILM COATED ORAL ONCE
Qty: 0 | Refills: 0 | Status: COMPLETED | OUTPATIENT
Start: 2018-10-11 | End: 2018-10-11

## 2018-10-11 RX ORDER — IBUPROFEN 200 MG
800 TABLET ORAL ONCE
Qty: 0 | Refills: 0 | Status: COMPLETED | OUTPATIENT
Start: 2018-10-11 | End: 2018-10-11

## 2018-10-11 RX ADMIN — ONDANSETRON 4 MILLIGRAM(S): 8 TABLET, FILM COATED ORAL at 20:04

## 2018-10-11 RX ADMIN — SODIUM CHLORIDE 1000 MILLILITER(S): 9 INJECTION INTRAMUSCULAR; INTRAVENOUS; SUBCUTANEOUS at 20:04

## 2018-10-11 RX ADMIN — Medication 800 MILLIGRAM(S): at 20:04

## 2018-10-11 NOTE — ED PROVIDER NOTE - NS ED ROS FT
Eyes:  No visual changes, eye pain or discharge.  ENMT:  No hearing changes, pain, no sore throat or runny nose, no difficulty swallowing  Cardiac:  No chest pain, SOB or edema. No chest pain with exertion.  Respiratory:  No cough or respiratory distress. No hemoptysis. No history of asthma or RAD.  GI:  No nausea, vomiting, diarrhea or abdominal pain.  :  No dysuria, frequency or burning.  MS:  No myalgia or muscle weakness. + joint pain, back pain  Neuro:  No headache or weakness.  No LOC.  Skin:  No skin rash.   Endocrine: No history of thyroid disease or diabetes.
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
## 2018-10-11 NOTE — ED PROVIDER NOTE - PHYSICAL EXAMINATION
CONSTITUTIONAL: Well-developed; well-nourished; in pain.  SKIN: warm, dry  HEAD: Normocephalic; contusion to the occipital head  EYES: PERRL, EOMI, no conjunctival erythema  ENT: No nasal discharge; airway clear.  NECK: Tenderness to palpation of the cervical spine.  CARD: S1, S2 normal; no murmurs, gallops, or rubs. Regular rate and rhythm.   RESP: No wheezes, rales or rhonchi.  ABD: soft ntnd  EXT: Normal ROM.  No clubbing, cyanosis or edema. Tenderness to palpation of the thoracic and lumbar spine.  LYMPH: No acute cervical adenopathy.  NEURO: Alert, oriented, grossly unremarkable. Motor and sensation grossly intact and equal in B/L UE + LE.  PSYCH: Cooperative, appropriate.
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
## 2018-10-12 PROBLEM — F10.10 ALCOHOL ABUSE, UNCOMPLICATED: Chronic | Status: ACTIVE | Noted: 2018-06-30

## 2018-10-12 PROBLEM — F32.9 MAJOR DEPRESSIVE DISORDER, SINGLE EPISODE, UNSPECIFIED: Chronic | Status: ACTIVE | Noted: 2018-06-30

## 2018-10-12 PROBLEM — F90.9 ATTENTION-DEFICIT HYPERACTIVITY DISORDER, UNSPECIFIED TYPE: Chronic | Status: ACTIVE | Noted: 2018-06-30

## 2018-10-12 PROBLEM — F41.9 ANXIETY DISORDER, UNSPECIFIED: Chronic | Status: ACTIVE | Noted: 2018-03-11

## 2018-10-12 PROBLEM — F41.9 ANXIETY DISORDER, UNSPECIFIED: Chronic | Status: ACTIVE | Noted: 2018-06-30

## 2018-10-12 PROBLEM — J45.909 UNSPECIFIED ASTHMA, UNCOMPLICATED: Chronic | Status: ACTIVE | Noted: 2018-06-30

## 2018-10-12 LAB
AMPHET UR-MCNC: NEGATIVE — SIGNIFICANT CHANGE UP
BARBITURATES UR SCN-MCNC: NEGATIVE — SIGNIFICANT CHANGE UP
BENZODIAZ UR-MCNC: NEGATIVE — SIGNIFICANT CHANGE UP
COCAINE METAB.OTHER UR-MCNC: NEGATIVE — SIGNIFICANT CHANGE UP
METHADONE UR-MCNC: NEGATIVE — SIGNIFICANT CHANGE UP
OPIATES UR-MCNC: NEGATIVE — SIGNIFICANT CHANGE UP
PCP SPEC-MCNC: SIGNIFICANT CHANGE UP
PROPOXYPHENE QUALITATIVE URINE RESULT: NEGATIVE — SIGNIFICANT CHANGE UP

## 2018-12-04 NOTE — ED BEHAVIORAL HEALTH ASSESSMENT NOTE - NS ED BHA COCAINE
Progress Notes by René Posey DO at 08/13/18 11:08 AM     Author:  René Posey DO Service:  (none) Author Type:  Physician     Filed:  08/13/18 11:34 AM Encounter Date:  8/13/2018 Status:  Signed     :  René Posey DO (Physician)            Last visit with RENÉ POSEY was on No match found  Last visit with [facility] was on 02/10/2016 at  8:05 AM in [facility]  Match done based on reference date of today 8/13/18[RI1.1T]  Donnell is a 2 year old male who presents to walk in care for a week of worsening sinus symptoms.  Complaining of sinus pressure, cough and purulent drainage.  Not having any wheeze, hemoptysis, orthopnea or chest pain. Denies weight loss, recent travel.  Not responding to OTC medications.      Other ROS:  Fevers:[RI1.2T]no[RI1.2M]  Tobacco Use:[RI1.2T]no[RI1.2M]    No past medical history on file.  Allergies:  Review of patient's allergies indicates no known allergies.    OBJECTIVE:  Pulse 116  Temp 98.5 °F (36.9 °C)  Resp 20  Wt 30 lb (13.6 kg)  SpO2 98%  Nontoxic in appearance, normal mental status.  Cranial nerves intact    Ears:  No canal or TM erythema  Conjunctiva- clear  Positive paranasal tenderness  Nasal Mucosa- pale, boggy, purulent rhinorhea noted    Oropharynx- moist mucus membranes, erythematous- some drainage noted  Airways patents  Neck supple, some anterior cervical lymphadenopathy noted.    No meningeal signs    Lungs- some cough, but essentially clear- no wheexing noted  CVS- S1 S2 present, no rubs murmur or gallop noted    Assesement/Plan:    Sinusitis    Discussed this with the patient.  Recommend symptomatic measures, including OTC decongestants and/or antihistamines.    @  No current outpatient prescriptions on file.       Side effects of all medications were discussed.  Patient is instructed to follow up in 2-3 days or as needed.  Patient is to go to the emergency room for any significant change or worsening.  Patient was  discharged in a stable condition and was in agreement with the plan.  No further questions.    Electronically Signed by:    René Posey DO , 8/13/2018[RI1.2T]            Revision History        User Key Date/Time User Provider Type Action    > RI1.2 08/13/18 11:34 AM René Posey DO Physician Sign     RI1.1 08/13/18 11:08 AM René Posey DO Physician     M - Manual, T - Template             None known

## 2020-06-12 ENCOUNTER — EMERGENCY (EMERGENCY)
Facility: HOSPITAL | Age: 30
LOS: 0 days | Discharge: HOME | End: 2020-06-12
Attending: EMERGENCY MEDICINE | Admitting: EMERGENCY MEDICINE
Payer: MEDICAID

## 2020-06-12 VITALS
OXYGEN SATURATION: 100 % | DIASTOLIC BLOOD PRESSURE: 100 MMHG | RESPIRATION RATE: 18 BRPM | SYSTOLIC BLOOD PRESSURE: 148 MMHG | HEART RATE: 79 BPM | TEMPERATURE: 98 F | WEIGHT: 169.98 LBS
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
## 2020-06-12 DIAGNOSIS — Z88.0 ALLERGY STATUS TO PENICILLIN: ICD-10-CM

## 2020-06-12 DIAGNOSIS — J45.909 UNSPECIFIED ASTHMA, UNCOMPLICATED: ICD-10-CM

## 2020-06-12 DIAGNOSIS — F41.8 OTHER SPECIFIED ANXIETY DISORDERS: ICD-10-CM
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
## 2020-06-12 DIAGNOSIS — M25.579 PAIN IN UNSPECIFIED ANKLE AND JOINTS OF UNSPECIFIED FOOT: ICD-10-CM

## 2020-06-12 DIAGNOSIS — Z94.7 CORNEAL TRANSPLANT STATUS: ICD-10-CM

## 2020-06-12 DIAGNOSIS — F90.9 ATTENTION-DEFICIT HYPERACTIVITY DISORDER, UNSPECIFIED TYPE: ICD-10-CM

## 2020-06-12 DIAGNOSIS — Z94.7 CORNEAL TRANSPLANT STATUS: Chronic | ICD-10-CM
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
## 2020-06-12 DIAGNOSIS — S93.401A SPRAIN OF UNSPECIFIED LIGAMENT OF RIGHT ANKLE, INITIAL ENCOUNTER: ICD-10-CM
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
## 2020-06-12 DIAGNOSIS — F17.210 NICOTINE DEPENDENCE, CIGARETTES, UNCOMPLICATED: ICD-10-CM

## 2020-06-12 PROCEDURE — 73610 X-RAY EXAM OF ANKLE: CPT | Mod: 26,RT

## 2020-06-12 PROCEDURE — 29515 APPLICATION SHORT LEG SPLINT: CPT

## 2020-06-12 PROCEDURE — 99283 EMERGENCY DEPT VISIT LOW MDM: CPT | Mod: 25

## 2020-06-12 PROCEDURE — 73630 X-RAY EXAM OF FOOT: CPT | Mod: 26,RT

## 2020-06-12 RX ORDER — IBUPROFEN 200 MG
600 TABLET ORAL ONCE
Refills: 0 | Status: COMPLETED | OUTPATIENT
Start: 2020-06-12 | End: 2020-06-12

## 2020-06-12 RX ADMIN — Medication 600 MILLIGRAM(S): at 07:01

## 2020-06-12 NOTE — ED ADULT NURSE NOTE - NSIMPLEMENTINTERV_GEN_ALL_ED
Implemented All Universal Safety Interventions:  [place] to call system. Call bell, personal items and telephone within reach. Instruct patient to call for assistance. Room bathroom lighting operational. Non-slip footwear when patient is off stretcher. Physically safe environment: no spills, clutter or unnecessary equipment. Stretcher in lowest position, wheels locked, appropriate side rails in place.

## 2020-06-12 NOTE — ED PROVIDER NOTE - CARE PROVIDER_API CALL
Renan Ontiveros  Orthopaedic Surgery  [address] [place] [address]  Phone: [phone]  Fax: [phone]  Follow Up Time: 4-6 Days

## 2020-06-12 NOTE — ED PROVIDER NOTE - NSFOLLOWUPCLINICS_GEN_ALL_ED_FT
[facility]  Orthpedic  [address], [place]   Phone: [phone]  Fax:   Follow Up Time: 4-6 Days

## 2020-06-12 NOTE — ED PROVIDER NOTE - PATIENT PORTAL LINK FT
You can access the FollowMyHealth Patient Portal offered by [facility] by registering at the following website: [email or web address]. By joining [employer]’s FollowMyHealth portal, you will also be able to view your health information using other applications (apps) compatible with our system.
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
## 2020-06-12 NOTE — ED PROVIDER NOTE - MUSCULOSKELETAL, MLM
Spine appears normal, range of motion is not limited, R LE: Hip full ROM, no bony tenderness, Knee full ROM, ankle edema to the lateral malleolus non tender no warmth no erythema sensation intact, 2 + DP and PT pulses, cap refill < 2 sec

## 2020-06-12 NOTE — ED PROVIDER NOTE - NSFOLLOWUPINSTRUCTIONS_ED_ALL_ED_FT
Ankle Sprain     An ankle sprain is a stretch or tear in one of the tough tissues (ligaments) that connect the bones in your ankle. An ankle sprain can happen when the ankle rolls outward (inversion sprain) or inward (eversion sprain).  What are the causes?  This condition is caused by rolling or twisting the ankle.  What increases the risk?  You are more likely to develop this condition if you play sports.  What are the signs or symptoms?  Symptoms of this condition include:  Pain in your ankle. Swelling. Bruising. This may happen right after you sprain your ankle or 1–2 days later.Trouble standing or walking.How is this diagnosed?  This condition is diagnosed with:  A physical exam. During the exam, your doctor will press on certain parts of your foot and ankle and try to move them in certain ways.X-ray imaging. These may be taken to see how bad the sprain is and to check for broken bones.How is this treated?  This condition may be treated with:  A brace or splint. This is used to keep the ankle from moving until it heals.An elastic bandage. This is used to support the ankle.Crutches.Pain medicine.Surgery. This may be needed if the sprain is very bad.Physical therapy. This may help to improve movement in the ankle.Follow these instructions at home:  If you have a brace or a splint:     Wear the brace or splint as told by your doctor. Remove it only as told by your doctor.Loosen the brace or splint if your toes:  Tingle. Lose feeling (become numb).Turn cold and blue.Keep the brace or splint clean.If the brace or splint is not waterproof:  Do not let it get wet.Cover it with a watertight covering when you take a bath or a shower.If you have an elastic bandage (dressing):     Remove it to shower or bathe. Try not to move your ankle much, but wiggle your toes from time to time. This helps to prevent swelling. Adjust the dressing if it feels too tight.Loosen the dressing if your foot:   Loses feeling.Tingles.Becomes cold and blue.Managing pain, stiffness, and swelling        Take over-the-counter and prescription medicines only as told by doctor.For 2–3 days, keep your ankle raised (elevated) above the level of your heart.If told, put ice on the injured area:  If you have a removable brace or splint, remove it as told by your doctor.Put ice in a plastic bag. Place a towel between your skin and the bag. Leave the ice on for 20 minutes, 2–3 times a day.General instructions     Rest your ankle.Do not use your injured leg to support your body weight until your doctor says that you can. Use crutches as told by your doctor.Do not use any products that contain nicotine or tobacco, such as cigarettes, e-cigarettes, and chewing tobacco. If you need help quitting, ask your doctor.Keep all follow-up visits as told by your doctor.Contact a doctor if:  Your bruises or swelling are quickly getting worse.Your pain does not get better after you take medicine.Get help right away if:  You cannot feel your toes or foot.Your foot or toes look blue.You have very bad pain that gets worse.Summary  An ankle sprain is a stretch or tear in one of the tough tissues (ligaments) that connect the bones in your ankle.This condition is caused by rolling or twisting the ankle.Symptoms include pain, swelling, bruising, and trouble walking.To help with pain and swelling, put ice on the injured ankle, raise your ankle above the level of your heart, and use an elastic bandage. Also, rest as told by your doctor.Keep all follow-up visits as told by your doctor. This is important.This information is not intended to replace advice given to you by your health care provider. Make sure you discuss any questions you have with your health care provider.

## 2020-06-12 NOTE — ED PROVIDER NOTE - PROGRESS NOTE DETAILS
Discussed need to follow up ortho. Discussed crutches. Discussed signs and symptoms to return to the ED for. Pt understands and agrees with discharge plan

## 2020-06-12 NOTE — ED PROVIDER NOTE - ATTENDING CONTRIBUTION TO CARE
29 yr old male here for right ankle pain. pt reports 29 yr old male here for right ankle pain. pt reports woke up with pain to right ankle. no specific injury mechanism but was using it a lot yesterday. no fever, chills, fall. on exam full rom right knee,  rom right ankle due to pain, ttp lateral mall, swelling to ankle, no 5th MT TTP. foot/ankle is warm, 2+ DP Pulse

## 2020-08-25 NOTE — ED PROVIDER NOTE - ENMT NEGATIVE STATEMENT, MLM
Beau,  [facility]  [address] [place] [address]  
Ears: no ear pain and no hearing problems.Nose: no nasal congestion and no nasal drainage.Mouth/Throat: no dysphagia, no hoarseness and no throat pain.Neck: no lumps, no pain, no stiffness and no swollen glands.

## 2021-06-13 NOTE — ED PROVIDER NOTE - CHIEF COMPLAINT
"Nadine Kilgore is a 32 y.o. female who is being evaluated via a billable video visit.      The patient has been notified of following:     \"This video visit will be conducted via a call between you and your physician/provider. We have found that certain health care needs can be provided without the need for an in-person physical exam.  This service lets us provide the care you need with a video conversation.  If a prescription is necessary we can send it directly to your pharmacy.  If lab work is needed we can place an order for that and you can then stop by our lab to have the test done at a later time.    Video visits are billed at different rates depending on your insurance coverage. Please reach out to your insurance provider with any questions.    If during the course of the call the physician/provider feels a video visit is not appropriate, you will not be charged for this service.\"    Patient has given verbal consent to a Video visit? Yes  How would you like to obtain your AVS? AVS Preference: MyChart.  If dropped by the video visit, the video invitation should be sent to: Send to e-mail at: [email or web address]  Will anyone else be joining your video visit? Yes: Daughter. How would they like to receive their invitation? Text to cell phone: cell      Video Start Time: 3:27 pm    Additional provider notes: no     SUBJECTIVE: Nadine Kilgore is a 32 y.o. female with:  Chief Complaint   Patient presents with     Positive for COVID, not feeling well    She developed headache/ fever / temp to 101 / myalgias/ decreased smell and taste 10 days ago. Had positive COVID test at  on Dec 7.  No diarrhea/ shortness of breath / cough.  She gradually improved, about 50% better but last night developed sore throat and was chilled. She felt she had temp but did not recheck.  She has some swelling right side of neck.  Hard to eat / drink.  She is very fatigued.  Also notices a sore in her mouth. Daughter also tested " The patient is a 27y Male complaining of anxiety. positive for COVID and now has some mouth sores.    She is a single mother with 3 yo daughter and no family for support.  Works in health care.    Patient Active Problem List   Diagnosis     Carpal tunnel syndrome     Moderate single current episode of major depressive disorder (H)     Bereavement, uncomplicated     Hemoptysis     Fever, unspecified fever cause     Acute non-recurrent maxillary sinusitis     Latent tuberculosis      OBJECTIVE: GENERAL: Healthy, alert and no distress  EYES: Eyes grossly normal to inspection. No discharge or erythema, or obvious scleral/conjunctival abnormalities.  RESP: No audible wheeze, cough, or visible cyanosis.  No visible retractions or increased work of breathing.    NEURO: Cranial nerves grossly intact. Mentation and speech appropriate for age.  PSYCH: Mentation appears normal, affect normal/bright, judgement and insight intact, normal speech and appearance well-groomed    Nadine was seen today for positive for covid, not feeling well.    Diagnoses and all orders for this visit:    Sore throat - Schedule drive by testing to r/o strep.  Her daughter also has mouth sores so another viral infection is possibility.    -     Rapid Strep A Screen-Throat; Future    Infection due to 2019 novel coronavirus - She is 10 days out from her positive test so could return to work next week assuming this other infection resolves.  She does want a COVID re-test and this has already been order by her primary physician.  Discussed that fatigue can linger with COVID.  Rest as much as possible.           Video-Visit Details    Type of service:  Video Visit    Video End Time (time video stopped): 3:38 pm  Originating Location (pt. Location): Home    Distant Location (provider location):  [facility]     Platform used for Video Visit: Elda Syed MD

## 2021-10-11 ENCOUNTER — INPATIENT (INPATIENT)
Facility: HOSPITAL | Age: 31
LOS: 6 days | Discharge: HOME | End: 2021-10-18
Attending: PSYCHIATRY & NEUROLOGY | Admitting: PSYCHIATRY & NEUROLOGY
Payer: MEDICAID

## 2021-10-11 VITALS
RESPIRATION RATE: 16 BRPM | SYSTOLIC BLOOD PRESSURE: 152 MMHG | OXYGEN SATURATION: 96 % | HEIGHT: 67 IN | DIASTOLIC BLOOD PRESSURE: 88 MMHG | HEART RATE: 108 BPM

## 2021-10-11 DIAGNOSIS — F19.94 OTHER PSYCHOACTIVE SUBSTANCE USE, UNSPECIFIED WITH PSYCHOACTIVE SUBSTANCE-INDUCED MOOD DISORDER: ICD-10-CM

## 2021-10-11 DIAGNOSIS — Z94.7 CORNEAL TRANSPLANT STATUS: Chronic | ICD-10-CM

## 2021-10-11 LAB
ANION GAP SERPL CALC-SCNC: 11 MMOL/L — SIGNIFICANT CHANGE UP (ref 7–14)
APAP SERPL-MCNC: <5 UG/ML — LOW (ref 10–30)
BASE EXCESS BLDV CALC-SCNC: 2.2 MMOL/L — SIGNIFICANT CHANGE UP (ref -2–3)
BASOPHILS # BLD AUTO: 0.05 K/UL — SIGNIFICANT CHANGE UP (ref 0–0.2)
BASOPHILS NFR BLD AUTO: 0.4 % — SIGNIFICANT CHANGE UP (ref 0–1)
BUN SERPL-MCNC: 16 MG/DL — SIGNIFICANT CHANGE UP (ref 10–20)
CA-I SERPL-SCNC: 1.19 MMOL/L — SIGNIFICANT CHANGE UP (ref 1.15–1.33)
CALCIUM SERPL-MCNC: 9.8 MG/DL — SIGNIFICANT CHANGE UP (ref 8.5–10.1)
CHLORIDE SERPL-SCNC: 104 MMOL/L — SIGNIFICANT CHANGE UP (ref 98–110)
CO2 SERPL-SCNC: 24 MMOL/L — SIGNIFICANT CHANGE UP (ref 17–32)
CREAT SERPL-MCNC: 0.8 MG/DL — SIGNIFICANT CHANGE UP (ref 0.7–1.5)
EOSINOPHIL # BLD AUTO: 0.17 K/UL — SIGNIFICANT CHANGE UP (ref 0–0.7)
EOSINOPHIL NFR BLD AUTO: 1.5 % — SIGNIFICANT CHANGE UP (ref 0–8)
ETHANOL SERPL-MCNC: <10 MG/DL — SIGNIFICANT CHANGE UP
GAS PNL BLDV: 137 MMOL/L — SIGNIFICANT CHANGE UP (ref 136–145)
GAS PNL BLDV: SIGNIFICANT CHANGE UP
GLUCOSE SERPL-MCNC: 98 MG/DL — SIGNIFICANT CHANGE UP (ref 70–99)
HCO3 BLDV-SCNC: 27 MMOL/L — SIGNIFICANT CHANGE UP (ref 22–29)
HCT VFR BLD CALC: 46.5 % — SIGNIFICANT CHANGE UP (ref 42–52)
HCT VFR BLDA CALC: 48 % — SIGNIFICANT CHANGE UP (ref 39–51)
HGB BLD CALC-MCNC: 16 G/DL — SIGNIFICANT CHANGE UP (ref 12.6–17.4)
HGB BLD-MCNC: 16 G/DL — SIGNIFICANT CHANGE UP (ref 14–18)
IMM GRANULOCYTES NFR BLD AUTO: 0.5 % — HIGH (ref 0.1–0.3)
LACTATE BLDV-MCNC: 1.2 MMOL/L — SIGNIFICANT CHANGE UP (ref 0.5–2)
LYMPHOCYTES # BLD AUTO: 1.66 K/UL — SIGNIFICANT CHANGE UP (ref 1.2–3.4)
LYMPHOCYTES # BLD AUTO: 14.9 % — LOW (ref 20.5–51.1)
MCHC RBC-ENTMCNC: 31.9 PG — HIGH (ref 27–31)
MCHC RBC-ENTMCNC: 34.4 G/DL — SIGNIFICANT CHANGE UP (ref 32–37)
MCV RBC AUTO: 92.8 FL — SIGNIFICANT CHANGE UP (ref 80–94)
MONOCYTES # BLD AUTO: 0.58 K/UL — SIGNIFICANT CHANGE UP (ref 0.1–0.6)
MONOCYTES NFR BLD AUTO: 5.2 % — SIGNIFICANT CHANGE UP (ref 1.7–9.3)
NEUTROPHILS # BLD AUTO: 8.6 K/UL — HIGH (ref 1.4–6.5)
NEUTROPHILS NFR BLD AUTO: 77.5 % — HIGH (ref 42.2–75.2)
NRBC # BLD: 0 /100 WBCS — SIGNIFICANT CHANGE UP (ref 0–0)
PCO2 BLDV: 43 MMHG — SIGNIFICANT CHANGE UP (ref 42–55)
PH BLDV: 7.41 — SIGNIFICANT CHANGE UP (ref 7.32–7.43)
PLATELET # BLD AUTO: 302 K/UL — SIGNIFICANT CHANGE UP (ref 130–400)
PO2 BLDV: 54 MMHG — SIGNIFICANT CHANGE UP
POTASSIUM BLDV-SCNC: 5.1 MMOL/L — SIGNIFICANT CHANGE UP (ref 3.5–5.1)
POTASSIUM SERPL-MCNC: 4.1 MMOL/L — SIGNIFICANT CHANGE UP (ref 3.5–5)
POTASSIUM SERPL-SCNC: 4.1 MMOL/L — SIGNIFICANT CHANGE UP (ref 3.5–5)
RBC # BLD: 5.01 M/UL — SIGNIFICANT CHANGE UP (ref 4.7–6.1)
RBC # FLD: 11.6 % — SIGNIFICANT CHANGE UP (ref 11.5–14.5)
SALICYLATES SERPL-MCNC: <0.3 MG/DL — LOW (ref 4–30)
SAO2 % BLDV: 89.1 % — SIGNIFICANT CHANGE UP
SARS-COV-2 RNA SPEC QL NAA+PROBE: SIGNIFICANT CHANGE UP
SODIUM SERPL-SCNC: 139 MMOL/L — SIGNIFICANT CHANGE UP (ref 135–146)
WBC # BLD: 11.12 K/UL — HIGH (ref 4.8–10.8)
WBC # FLD AUTO: 11.12 K/UL — HIGH (ref 4.8–10.8)

## 2021-10-11 PROCEDURE — 93010 ELECTROCARDIOGRAM REPORT: CPT

## 2021-10-11 PROCEDURE — 90792 PSYCH DIAG EVAL W/MED SRVCS: CPT | Mod: 95

## 2021-10-11 PROCEDURE — 99285 EMERGENCY DEPT VISIT HI MDM: CPT

## 2021-10-11 RX ADMIN — Medication 50 MILLIGRAM(S): at 21:59

## 2021-10-11 NOTE — ED PROVIDER NOTE - OBJECTIVE STATEMENT
30 y.o. male pmh of Bipolar D/O, Mood Disorder, ADHD, polysubstance usage BIBEMS for agitation and HI from home. Neighbor [id number] because she heard pt screaming at mother. As per EMS given 10mg IM Versed for agitation. As per mother pt has been admitted to [facility] 4-6 times. Is not compliant with any medications. Has not seen a psychiatrist in over 6 years outpatient. + marijuana usage. Has been removed from homeless shelters 2/2 behavior and aggressions. Most recent living situation was with mother. Mother is afraid son will kill her, no longer feels safe supporting him. As per mother recently increase + hallucinations + voices in head and responding to them + decreased po intake and + insomnia + increased shop lifting.

## 2021-10-11 NOTE — ED BEHAVIORAL HEALTH ASSESSMENT NOTE - PSYCHIATRIC ISSUES AND PLAN (INCLUDE STANDING AND PRN MEDICATION)
Will defer standing psych meds to the inpatient team, can get haldol 5 and ativan 2 prn for agitation, IM if dangerousness

## 2021-10-11 NOTE — ED BEHAVIORAL HEALTH NOTE - BEHAVIORAL HEALTH NOTE
===================  PRE-HOSPITAL COURSE  ===================  SOURCE:  RN and Secondhand EMR documentation.   DETAILS:  Patient was BIBPD from home; chief complaint of agitation.     ============  ED COURSE   ============  SOURCE:  RN and Secondhand EMR documentation.   ARRIVAL: Patient was sedated when arrived to ED; was gowned, wanded, and placed on 1:1 in private room. Patient presents with fair hygiene/grooming.   BELONGINGS: None notable.    BEHAVIOR: Patient has been agitated, yelling, and uncooperative while in ED. Patient presently denies SI/HI/AH/VH; has been yelling at hospital staff in [place]. Patient's speech of slow rate accompanied by somewhat logical thought process patient is AOx4. Patient has been resting in hospital bed and has eaten/drank.   TREATMENT:  Patient received 10mg Versed in transport, received Librium 50mg PO.   VISITORS:  Patient presently unaccompanied by social supports while in ED; mother's contact information in chart.     COVID Exposure Screen- collateral (i.e. third-party, chart review, belongings, etc; include EMS and ED staff)  1.	*Has the patient had a COVID-19 test in the last 90 days?  (  ) Yes   (  ) No   ( X) Unknown- Reason: _____  IF YES PROCEED TO QUESTION #2. IF NO OR UNKNOWN, PLEASE SKIP TO QUESTION #3.  2.	Date of test(s) and result(s): ________  3.	*Has the patient tested positive for COVID-19 antibodies? (  ) Yes   ( X) No   (  ) Unknown- Reason: _____  IF YES PROCEED TO QUESTION #4. IF NO or UNKNOWN, PLEASE SKIP TO QUESTION #5.  4.	Date of positive antibody test: ____  ____  5.	*Has the patient received 2 doses of the COVID-19 vaccine? (  ) Yes   ( X) No   (  ) Unknown- Reason: _____  IF YES PROCEED TO QUESTION #6. IF NO or UNKNOWN, PLEASE SKIP TO QUESTION #7.  6.	 Date of second dose: ________  7.	*In the past 10 days, has the patient been around anyone with a positive COVID-19 test?* (  ) Yes   ( X) No   (  ) Unknown- Reason: __  IF YES PROCEED TO QUESTION #8. IF NO or UNKNOWN, PLEASE SKIP TO QUESTION #13.  8.	Was the patient within 6 feet of them for at least 15 minutes? (  ) Yes   (  ) No   (  ) Unknown- Reason: _____  9.	Did the patient provide care for them? (  ) Yes   (  ) No   (  ) Unknown- Reason: ______  10.	Did the patient have direct physical contact with them (touched, hugged, or kissed them)? (  ) Yes   (  ) No    (  ) Unknown- Reason: __  11.	Did the patient share eating or drinking utensils with them? (  ) Yes   (  ) No    (  ) Unknown- Reason: ____  12.	Did they sneeze, cough, or somehow get respiratory droplets on the patient? (  ) Yes   (  ) No    (  ) Unknown- Reason: ______  13.	*Has the patient been out of [place] within the past 10 days?* (  ) Yes   ( X) No   (  ) Unknown- Reason: _____  IF YES PLEASE ANSWER THE FOLLOWING QUESTIONS:  14.	Which state/country did they go to? ______  15.	Were they there over 24 hours? (  ) Yes   (  ) No    (  ) Unknown- Reason: ______  16.	Date of return to [facility]: ______      ========================  FOR EACH COLLATERAL  ========================  NAME: Niki  NUMBER: [phone]  RELATIONSHIP: Mother  RELIABILITY: Reliable  COMMENTS: Was living with patient     ========================  PATIENT DEMOGRAPHICS: Patient is a 29 y/o male, temporarily domiciled in private home with mother, has been in and out of homeless shelters, unemployed, single, noncaregiver.   ========================  HPI  BASELINE FUNCTIONING: Patient at baseline able to attend to ADL's without incident; reports patient has been in and out of shelters and [facility] as of late. Patient is presently unemployed and on SSD. Patient presently not engaged in any outpatient treatment and has been reluctant to do so for some time.   DATE HPI STARTED: Past week.   DECOMPENSATION: Collateral reports patient returned to her home a week ago after being kicked out of a shelter for noncompliance; returned to stay with her since he didn't have anywhere else to go. Since he's been back with mother, he's bene increasingly agitated, talking/yelling to himself, irritable, and threatening towards her. Collateral endorses patient has not been sleeping and has been staying up all night, will go outside of the home at night and leave, unsure of where he goes however he has been yelling in the street and scaring neighbors. Collateral endorses patient today had gotten in verbal altercation with mother where he was verbally aggressive and threatening to kill her; endorses that neighbor heard this altercation and called 911. Collateral endorses she is scared for her safety and that he would not be able to return home to her; endorses past hx of OOP due to patient choking and getting physically aggressive towards her.   SUICIDALITY: Collateral endorses SI statement frequently, denies SIB, endorses SI gesture of putting knife to neck most recently appx a month ago, denies known SA.   VIOLENCE: Collateral endorses recent verbal and physical aggression; HI statements, AH/VH talking to himself when both sober and high. Endorses patient will start fights with random people outside.   SUBSTANCE:  Collateral endorses patient has been smoking marijuana, most recently today, has been drinking as well.       ========================  PAST PSYCHIATRIC HISTORY  ========================  DATE PAST PSYCHIATRIC HISTORY STARTED: Chronic  MAIN PSYCHIATRIC DIAGNOSIS: Anxiety/ Possible Bipolar, ADHD  PSYCHIATRIC HOSPITALIZATIONS: 4-6times at [facility].   PRIOR ILLNESS: Collateral endorses history of noncompliance to treatment.   SUICIDALITY: Collateral endorses past SI statements/gestures, unsure of SA.   VIOLENCE: Collateral endorses history of violence against her, choking, pushing, verbal aggression to her and others in neighborhood, AH/VH, poor impulse control.   SUBSTANCE USE: Chronic marijuana use since teenager.     ==============  OTHER HISTORY  ==============  CURRENT MEDICATION: Collateral denies.   MEDICAL HISTORY:  Hx corneal transplants.   ALLERGIES: Penicillins per chart/Dust.  LEGAL ISSUES: Former OOP against patient from mother, recent shoplifting two days ago where patient tried to steal eye drops from [employer] and was caught.   FIREARM ACCESS: Collateral unable to endorse.   SOCIAL HISTORY: Collateral endorses patient has experienced bouts of homelessness as of late.   FAMILY HISTORY: Collateral endorses history of substance abuse on father's side of family.   DEVELOPMENTAL HISTORY: Collateral endorsed past ADHD diagnosis.

## 2021-10-11 NOTE — ED BEHAVIORAL HEALTH ASSESSMENT NOTE - CURRENT ACTIVE IDEATION:
Interventional Radiology Intra-procedural Nursing Note    Patient Name: Antonio Ramirez  Medical Record Number: [id number]  Today's Date: May 24, 2018    Start Time: 856  End of procedure time: 930  Procedure: Ureteral stent placement - bilateral  Report given to: Jack ROBINS Sta 88  Time pt departs:  945  820 - pt to IR 2 via cart. Makayla NP into speak with pt. 830 - pt placed on exam table in a prone position.  Prepped with chloraprep and draped in a sterile fashion. 856 - Dr Javier into begin procedure. 911 - ureteral stent placed on left 8F x 26 CM.  Lot [id number]. Left Nephrostomy Tube exchanged 8F Lot [id number].  920 - ureteral stent placed on right 8 F x 26 cm - Lot [id number]. Right nephrostomy tube exchange 8F - Lot # [id number] 930 - procedure finish time.  Pt tolerated procedure well.  938 - Bilateral leg bag exchanged as well.   Other Notes:     Radha Anderson     None known

## 2021-10-11 NOTE — ED PROVIDER NOTE - CLINICAL SUMMARY MEDICAL DECISION MAKING FREE TEXT BOX
Diagnostic testing reviewed.  Patient is medically clear for psych evaluation. Diagnostic testing reviewed.  Patient is medically clear for psych evaluation. s/o to me from Dr. Perez- sp psych eval, will admit

## 2021-10-11 NOTE — ED BEHAVIORAL HEALTH ASSESSMENT NOTE - NSSUICRSKFACTOR_PSY_ALL_CORE
[FreeTextEntry1] : Ms. Grubbs is a 29 
Current and Past Psychiatric Diagnoses/Presenting Symptoms/Historical Factors/Treatment Related Factors

## 2021-10-11 NOTE — ED ADULT NURSE NOTE - NSICDXPASTMEDICALHX_GEN_ALL_CORE_FT
PAST MEDICAL HISTORY:  ADHD     Alcohol abuse     Anxiety     Anxiety     Asthma     Depression

## 2021-10-11 NOTE — ED PROVIDER NOTE - PHYSICAL EXAMINATION
Physical Exam    Vital Signs: I have reviewed the initial vital signs.  Constitutional: well-nourished, appears stated age, no acute distress  Eyes: Conjunctiva pink, Sclera clear, PERRLA, no ptosis, no entrapment, no racoon eyes.  Cardiovascular: S1 and S2, regular rate, regular rhythm  Respiratory: unlabored respiratory effort, speaking in full sentences, handling oral secretions, clear to auscultation bilaterally no wheezing, rales and rhonchi  Gastrointestinal: soft, non-tender abdomen, no pulsatile mass, normal bowl sounds  Musculoskeletal: supple neck, , no gross bony deformities or swelling.  Integumentary: warm, dry, no rashes, lacerations,  Neurologic: awake, no fasiculations no tremors

## 2021-10-11 NOTE — ED PROVIDER NOTE - PROGRESS NOTE DETAILS
dc: telepsych consult placed (Dr. Sneed) will evaluate. RK: received s/o from ethel bai. patient denies pain anywhere. admits to having an argument with mom prior. denies si and hi. teary and agitated screaming during exam. Will be admitted to [facility], awaiting psych bed

## 2021-10-11 NOTE — ED PROVIDER NOTE - ATTENDING CONTRIBUTION TO CARE
pt has a PMHx of BAD a/w aggressive and vioient outburst had a verbal altercations with hias mother today.  A neighbor heard the pt threateing his mother and called 911.  pt required Versed 5 mg x 2 PTA administered by EMS to control behavior.  In ED, airway intact.    VITAL SIGNS: I have reviewed nursing notes and confirm.  CONSTITUTIONAL: Well-developed; well-nourished; sedated.  SKIN: Skin exam is warm and dry, no acute rash.  HEAD: Normocephalic; atraumatic.  EYES: PERRL, injected conjunctiva and sclera clear.  NECK: Supple; non tender.  No lymphadenopathy.  CARD: S1, S2 normal; no murmurs, gallops, or rubs. Regular rate and rhythm.  RESP: No wheezes, rales or rhonchi.  ABD: Normal bowel sounds; soft; non-distended; non-tender; no hepatosplenomegaly.  EXT: Normal ROM. No clubbing, cyanosis or edema.  NEURO: Sedated.  responds slowly to verbal and tactile stimuli. pt has a PMHx of BAD a/w aggressive and violent outburst had a verbal altercations with hias mother today.  A neighbor heard the pt threatening his mother and called 911.  pt required Versed 5 mg x 2 PTA administered by EMS to control behavior.  In ED, airway intact.    VITAL SIGNS: I have reviewed nursing notes and confirm.  CONSTITUTIONAL: Well-developed; well-nourished; sedated.  SKIN: Skin exam is warm and dry, no acute rash.  HEAD: Normocephalic; atraumatic.  EYES: PERRL, injected conjunctiva and sclera clear.  NECK: Supple; non tender.  No lymphadenopathy.  CARD: S1, S2 normal; no murmurs, gallops, or rubs. Regular rate and rhythm.  RESP: No wheezes, rales or rhonchi.  ABD: Normal bowel sounds; soft; non-distended; non-tender; no hepatosplenomegaly.  EXT: Normal ROM. No clubbing, cyanosis or edema.  NEURO: Sedated.  responds slowly to verbal and tactile stimuli.

## 2021-10-11 NOTE — ED ADULT TRIAGE NOTE - CHIEF COMPLAINT QUOTE
BIBA from home as per EMS pt has an extensive psych hx and was flipping out. Neighbors called for a verbal altercation with family. Per EMS pt is non compliant with medications. Pt arrived with police custody for safe escort to ED. Pt received 10 mg of versed IM by EMS and was placed on 4l nc

## 2021-10-11 NOTE — ED BEHAVIORAL HEALTH ASSESSMENT NOTE - DETAILS
yes was bullied as a child father's side w/ substance use d/o tbd see above sedation from VPA patient denies lifetime h/o SI, per his mother he frequently threatens SI

## 2021-10-11 NOTE — ED BEHAVIORAL HEALTH ASSESSMENT NOTE - HPI (INCLUDE ILLNESS QUALITY, SEVERITY, DURATION, TIMING, CONTEXT, MODIFYING FACTORS, ASSOCIATED SIGNS AND SYMPTOMS)
Patient is a 29 yo man, single, 2 kids ( and 13 yo, not in his custody), tenuously domiciled w/ mom (was kicked out of a shelter last week for behavioral disturbances), PPH of PSA (EtOH, MJ), BAD, ADHD, ORIN, multiple prior psych admissions, most recently at [facility] a few months ago, he denies past suicide attempts, however per mom he frequently Patient is a 31 yo man, single, 2 kids ( and 11 yo, not in his custody), tenuously domiciled w/ mom (was kicked out of a shelter last week for behavioral disturbances), PPH of PSA (EtOH, MJ), BAD, ADHD, ORIN, multiple prior psych admissions, most recently at [facility] a few months ago, h/o detox/rehabs, he denies past suicide attempts, however per mom he frequently threatens suicide and least month he put a knife to his neck that she had to remove, h/o violence towards his mother, past OOP, nonadherent w/ outpt psychiatric care, h/o being bullied, PMH of corneal transplant (hasn't followed up w/ medical care for that), BIBEMS a/b neighbors after he was threatening his mother earlier today.    Per collateral from patient's mother patient has a poor functional baseline w/ frequent verbal and physical aggression, heavy substance use, poor insight into the nature of his mental illness, inability to maintain employment.  She notes that over the past few weeks however he has been off of his baseline, threatening SI, more aggressive, and more impulsive.  Last month she says in the middle of an argument he put a knife to his neck and threatened to kill himself and he had to remove it, she thinks he would have killed himself if she didn't.  She says last week he got kicked out of a shelter for agitation (would also not follow the rules of the shelter) and has been living with her.  During the past week he hasn't been sleeping, has hardly been eating, has been more impulsive (yesterday tried to steal eye drops) although has been adhering to other ADLs.  Today he was loudly yelling at his mother, threatening her, and neighbors called 911.  His mother is very concerned about him and doesn't feel it is safe for him to return home w/ her.    On interview patient has very poor insight and denies much of what his mother says about him.  He says that he plans to move to [place] however cannot say where he'll go or how he'll afford the move.  He says that he was arguing with his mother earlier today about smoking in the home but denies past physical aggression to her or previously threatening SI.  He says he was recently admitted for anxiety however hasn't been engaging in outpt care.  He is unable to engage in safety planning, and becomes very irritable when told about his mother's concerns.      On psych ROS denies changes in mood, reports chronically getting 4 hrs of sleep a night w/ good energy levels, denies change in appetite.  Reports some anxiety.  Denies SI/HI.  Denies grandiosity, impulsivity, increased goal-directed activity.  Denies ROYER DILL.    States he got the first dose of the Pfizer vaccine about a month ago, hasn't gotten the second, denies out of state travel or known COVID-19 contacts in the past 2 weeks, reports a negative COVID-19 test recently but doesn't remember when, hasn't had antibodies checked.

## 2021-10-11 NOTE — ED BEHAVIORAL HEALTH ASSESSMENT NOTE - SUMMARY
Although patient has a poor functional/psychiatric baseline w/ likely some chronic cognitive deficits, per collateral patient has been off of his baseline recently w/ recent interrupted suicide attempt, increasing threats of violence, impulsivity, poor PO and sleep, and worsening behavioral dysregulation.  He demonstrates very poor insight, is unable to engage in safety planning, and requires 2PC admission to prevent harm to others.  Ideally in the hospital patient could be set up w/ housing resources/[facility].

## 2021-10-11 NOTE — ED BEHAVIORAL HEALTH ASSESSMENT NOTE - RISK ASSESSMENT
Moderate Acute Suicide Risk Chronic risk factors include substance use, h/o violence, h/o SI, h/o being bullied.  Acute risk elevated by worsening agitation, SI, and impulsivity.  Requires 2PC admission.

## 2021-10-11 NOTE — ED BEHAVIORAL HEALTH ASSESSMENT NOTE - PAST PSYCHOTROPIC MEDICATION
Island Pedicle Flap-Requiring Vessel Identification Text: The defect edges were debeveled with a #15 scalpel blade.  Given the location of the defect, shape of the defect and the proximity to free margins an island pedicle advancement flap was deemed most appropriate.  Using a sterile surgical marker, an appropriate advancement flap was drawn, based on the axial vessel mentioned above, incorporating the defect, outlining the appropriate donor tissue and placing the expected incisions within the relaxed skin tension lines where possible.    The area thus outlined was incised deep to adipose tissue with a #15 scalpel blade.  The skin margins were undermined to an appropriate distance in all directions around the primary defect and laterally outward around the island pedicle utilizing iris scissors.  There was minimal undermining beneath the pedicle flap. reports previously being on VPA and xanax

## 2021-10-11 NOTE — ED BEHAVIORAL HEALTH ASSESSMENT NOTE - NSBHSAALC_PSY_A_CORE FT
says he started drinking at age 14, at most "pints a day", h/o w/d tremor, no past w/d seizures, last drink yesterday, BAL negative

## 2021-10-12 LAB
APPEARANCE UR: CLEAR — SIGNIFICANT CHANGE UP
BILIRUB UR-MCNC: NEGATIVE — SIGNIFICANT CHANGE UP
COLOR SPEC: YELLOW — SIGNIFICANT CHANGE UP
DIFF PNL FLD: NEGATIVE — SIGNIFICANT CHANGE UP
GLUCOSE UR QL: NEGATIVE MG/DL — SIGNIFICANT CHANGE UP
KETONES UR-MCNC: NEGATIVE — SIGNIFICANT CHANGE UP
LEUKOCYTE ESTERASE UR-ACNC: NEGATIVE — SIGNIFICANT CHANGE UP
NITRITE UR-MCNC: NEGATIVE — SIGNIFICANT CHANGE UP
PH UR: 5.5 — SIGNIFICANT CHANGE UP (ref 5–8)
PROT UR-MCNC: NEGATIVE MG/DL — SIGNIFICANT CHANGE UP
SP GR SPEC: >=1.03 (ref 1.01–1.03)
UROBILINOGEN FLD QL: 0.2 MG/DL — SIGNIFICANT CHANGE UP

## 2021-10-12 PROCEDURE — 99215 OFFICE O/P EST HI 40 MIN: CPT | Mod: 95

## 2021-10-12 RX ORDER — DIPHENHYDRAMINE HCL 50 MG
50 CAPSULE ORAL ONCE
Refills: 0 | Status: COMPLETED | OUTPATIENT
Start: 2021-10-12 | End: 2021-10-12

## 2021-10-12 RX ORDER — INFLUENZA VIRUS VACCINE 15; 15; 15; 15 UG/.5ML; UG/.5ML; UG/.5ML; UG/.5ML
0.5 SUSPENSION INTRAMUSCULAR ONCE
Refills: 0 | Status: COMPLETED | OUTPATIENT
Start: 2021-10-12 | End: 2021-10-12

## 2021-10-12 RX ORDER — HALOPERIDOL DECANOATE 100 MG/ML
5 INJECTION INTRAMUSCULAR ONCE
Refills: 0 | Status: COMPLETED | OUTPATIENT
Start: 2021-10-12 | End: 2021-10-12

## 2021-10-12 RX ORDER — HYDROXYZINE HCL 10 MG
50 TABLET ORAL EVERY 6 HOURS
Refills: 0 | Status: DISCONTINUED | OUTPATIENT
Start: 2021-10-12 | End: 2021-10-15

## 2021-10-12 RX ORDER — HALOPERIDOL DECANOATE 100 MG/ML
5 INJECTION INTRAMUSCULAR EVERY 6 HOURS
Refills: 0 | Status: DISCONTINUED | OUTPATIENT
Start: 2021-10-12 | End: 2021-10-18

## 2021-10-12 RX ADMIN — HALOPERIDOL DECANOATE 5 MILLIGRAM(S): 100 INJECTION INTRAMUSCULAR at 07:59

## 2021-10-12 RX ADMIN — Medication 2 MILLIGRAM(S): at 08:00

## 2021-10-12 NOTE — H&P ADULT - NSHPLABSRESULTS_GEN_ALL_CORE
16.0   11.12 )-----------( 302      ( 11 Oct 2021 15:20 )             46.5       10-11    139  |  104  |  16  ----------------------------<  98  4.1   |  24  |  0.8    Ca    9.8      11 Oct 2021 15:20                    Urinalysis Basic - ( 12 Oct 2021 07:30 )    Color: Yellow / Appearance: Clear / SG: >=1.030 / pH: x  Gluc: x / Ketone: Negative  / Bili: Negative / Urobili: 0.2 mg/dL   Blood: x / Protein: Negative mg/dL / Nitrite: Negative   Leuk Esterase: Negative / RBC: x / WBC x   Sq Epi: x / Non Sq Epi: x / Bacteria: x

## 2021-10-12 NOTE — PROGRESS NOTE BEHAVIORAL HEALTH - PROBLEM SELECTOR PLAN 1
-Admit to inpatient psychiatry (9.39 status)  -Would recommend starting  mg BID to target irritability  -Recommending Haldol 5 mg q6 PO/IM PRN agitation, Ativan q6 PO/IM PRN agitation, Benadryl 50 mg PO/IM q6 PRN agitation
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
## 2021-10-12 NOTE — H&P ADULT - ASSESSMENT
Patient is a 30 year old male with hx of Bipolar disorder, Mood Disorder, ADHD, polysubstance usage BIBEMS for agitation and HI from home. + etoh use    # homicidal ideation  # bipolar disorder  # ADHD  - management per psychiatry  - monitor EKG with QTc prolonging drugs  - labs    # polysubstance use  - etoh use with withdrawal, currently medicated and does not appear to be in withdrawal  - Addiction med consult Patient is a 30 year old male with hx of Bipolar disorder, Mood Disorder, ADHD, polysubstance usage BIBEMS for agitation and HI from home. + etoh use    # homicidal ideation  # bipolar disorder  # ADHD  - management per psychiatry  - monitor EKG with QTc prolonging drugs  - labs    # polysubstance use  - etoh use with withdrawal, currently medicated and does not appear to be in withdrawal  - Addiction med consult  - ativan prn for withdrawal
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
## 2021-10-12 NOTE — MEDICAL STUDENT PROGRESS NOTE(EDUCATION) - NS MD HP STUD ASPLAN ASSES FT
Russell is a 30-year-old male with past psychiatric history of polysubstance abuse (EtOH, MJ) with history of detox/rehabs, bipolar affective disorder, ADHD, ORIN, and multiple prior psych admissions, most recently at [facility] a few months ago. He was BIBEMS for agitation and threatening his mother.     On evaluation, patient is slightly agitated, drowsy, guarded, but cooperative. Drowsiness likely from Haldol injection he received in the ED. While patient denies suicidal/homicidal ideation and auditory/visual hallucinations at this time, in-patient hospitalization is warranted to ensure stability.
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
## 2021-10-12 NOTE — H&P ADULT - NSHPPHYSICALEXAM_GEN_ALL_CORE
Vital Signs (24 Hrs):  T(C): 36.4 (10-12-21 @ 15:13), Max: 36.6 (10-12-21 @ 13:43)  HR: 80 (10-12-21 @ 15:13) (76 - 85)  BP: 140/90 (10-12-21 @ 15:13) (132/87 - 141/100)  RR: 18 (10-12-21 @ 15:13) (18 - 18)    PHYSICAL EXAM:      Constitutional: lethargic, A&O x3 when awake    Eyes: PERRLA, no conjunctivitis    Neck: no lymphadenopathy    Respiratory: +air entry, no rales, no rhonchi, no wheezes    Cardiovascular: +S1 and S2, regular rate and rhythm    Gastrointestinal: +BS, soft, non-tender, not distended    Extremities:  no edema, no calf tenderness    Skin: no rashes, normal turgor

## 2021-10-12 NOTE — PATIENT PROFILE BEHAVIORAL HEALTH - VISION (WITH CORRECTIVE LENSES IF THE PATIENT USUALLY WEARS THEM):
Corneal Transplant Corneal Transplant/Normal vision: sees adequately in most situations; can see medication labels, newsprint

## 2021-10-12 NOTE — ED BEHAVIORAL HEALTH NOTE - BEHAVIORAL HEALTH NOTE
Telepsychiatry Reassessment Note:    MD received handoff on patient.     MD spoke to RN for updated ED course: patient was sleeping for several hours overnight but has woken up and is currently yelling at staff. MD requested patient be setup on cart if possible.     A/P: 31 y/o M, hx bipolar d/o, multiple IPPs (last at [facility]), polysubstance use (etoh, cannabis), BIBEMS for verbal aggression at home, per collateral weeks of decline including making threats of self harm with a knife, presents with clear decompensation requiring involuntary admission. no bed currently available.     - hold for bed  -psychiatry to follow  - for agitation would offer haldol 5mg/ativan 2mg, PO if patient amenable, IM if patient is an acute threat to safety of self/others, may repeat q6hrs PRN.

## 2021-10-12 NOTE — PROGRESS NOTE BEHAVIORAL HEALTH - NSBHFUPINTERVALHXFT_PSY_A_CORE
Patient seen and reassessed this morning. Chart reviewed.     FULL NOTE TO FOLLOW.    PATIENT FOR INVOLUNTARY (9.39) PSYCHIATRIC HOSPITALIZATION. RECOMMENDED HALDOL 5 MG, ATIVAN 2 MG, BENADRYL 50 MG TO TARGET AGITATION. DISCUSSED WITH EM PROVIDER. Patient seen and reassessed this morning. Chart reviewed. Patient perseverates on going home. He says "I'm willing to take medicine if I can just go home." When asked about events leading to his hospitalization he is dismissive and minimizing saying "I just had a little argument with my mother." Unable to elaborate at this time and returns to perseverating on discharge. When asked about medications he's tried, he does discuss a prior history of taking Xanax and Depakote, but says he has not been in any treatment "for years". Says he using marijuana daily and occasional alcohol use. Denies history of complicated withdrawal, DTs or seizures. Denies current suicidal ideation, intent or plan. Denies any homicidal/aggressive ideation. Denies dysphoria or anhedonia. Denies significant neurovegetative symptoms of depression. Denies any symptoms concerning for reyna/hypomania. Denies AH/VH/paranoid ideation. Denies any other perceptual disturbances. No delusions elicited on exam. Denies significant anxiety symptoms. Denies panic attacks. Denies symptoms consistent with OCD. Denies trauma history or any symptoms consistent with PTSD.

## 2021-10-12 NOTE — CHART NOTE - NSCHARTNOTEFT_GEN_A_CORE
Pt seen; discussed with resident. Chart reviewed.  No s/h ideation at this time. Will continue to assess. No clear indication to starting standing meds at this time.
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
## 2021-10-13 DIAGNOSIS — F29 UNSPECIFIED PSYCHOSIS NOT DUE TO A SUBSTANCE OR KNOWN PHYSIOLOGICAL CONDITION: ICD-10-CM

## 2021-10-13 DIAGNOSIS — F10.239 ALCOHOL DEPENDENCE WITH WITHDRAWAL, UNSPECIFIED: ICD-10-CM

## 2021-10-13 DIAGNOSIS — F12.10 CANNABIS ABUSE, UNCOMPLICATED: ICD-10-CM

## 2021-10-13 LAB
A1C WITH ESTIMATED AVERAGE GLUCOSE RESULT: 5.4 % — SIGNIFICANT CHANGE UP (ref 4–5.6)
ALBUMIN SERPL ELPH-MCNC: 4.6 G/DL — SIGNIFICANT CHANGE UP (ref 3.5–5.2)
ALP SERPL-CCNC: 86 U/L — SIGNIFICANT CHANGE UP (ref 30–115)
ALT FLD-CCNC: 25 U/L — SIGNIFICANT CHANGE UP (ref 0–41)
ANION GAP SERPL CALC-SCNC: 11 MMOL/L — SIGNIFICANT CHANGE UP (ref 7–14)
AST SERPL-CCNC: 29 U/L — SIGNIFICANT CHANGE UP (ref 0–41)
BILIRUB SERPL-MCNC: 0.5 MG/DL — SIGNIFICANT CHANGE UP (ref 0.2–1.2)
BUN SERPL-MCNC: 14 MG/DL — SIGNIFICANT CHANGE UP (ref 10–20)
CALCIUM SERPL-MCNC: 9.9 MG/DL — SIGNIFICANT CHANGE UP (ref 8.5–10.1)
CHLORIDE SERPL-SCNC: 98 MMOL/L — SIGNIFICANT CHANGE UP (ref 98–110)
CHOLEST SERPL-MCNC: 136 MG/DL — SIGNIFICANT CHANGE UP
CO2 SERPL-SCNC: 27 MMOL/L — SIGNIFICANT CHANGE UP (ref 17–32)
CREAT SERPL-MCNC: 0.9 MG/DL — SIGNIFICANT CHANGE UP (ref 0.7–1.5)
DRUG SCREEN 1, URINE RESULT: SIGNIFICANT CHANGE UP
ESTIMATED AVERAGE GLUCOSE: 108 MG/DL — SIGNIFICANT CHANGE UP (ref 68–114)
GLUCOSE SERPL-MCNC: 145 MG/DL — HIGH (ref 70–99)
HCT VFR BLD CALC: 48 % — SIGNIFICANT CHANGE UP (ref 42–52)
HDLC SERPL-MCNC: 45 MG/DL — SIGNIFICANT CHANGE UP
HGB BLD-MCNC: 16.4 G/DL — SIGNIFICANT CHANGE UP (ref 14–18)
LIPID PNL WITH DIRECT LDL SERPL: 75 MG/DL — SIGNIFICANT CHANGE UP
MCHC RBC-ENTMCNC: 32.2 PG — HIGH (ref 27–31)
MCHC RBC-ENTMCNC: 34.2 G/DL — SIGNIFICANT CHANGE UP (ref 32–37)
MCV RBC AUTO: 94.1 FL — HIGH (ref 80–94)
NON HDL CHOLESTEROL: 91 MG/DL — SIGNIFICANT CHANGE UP
NRBC # BLD: 0 /100 WBCS — SIGNIFICANT CHANGE UP (ref 0–0)
PLATELET # BLD AUTO: 324 K/UL — SIGNIFICANT CHANGE UP (ref 130–400)
POTASSIUM SERPL-MCNC: 4.4 MMOL/L — SIGNIFICANT CHANGE UP (ref 3.5–5)
POTASSIUM SERPL-SCNC: 4.4 MMOL/L — SIGNIFICANT CHANGE UP (ref 3.5–5)
PROT SERPL-MCNC: 7.1 G/DL — SIGNIFICANT CHANGE UP (ref 6–8)
RBC # BLD: 5.1 M/UL — SIGNIFICANT CHANGE UP (ref 4.7–6.1)
RBC # FLD: 11.5 % — SIGNIFICANT CHANGE UP (ref 11.5–14.5)
SODIUM SERPL-SCNC: 136 MMOL/L — SIGNIFICANT CHANGE UP (ref 135–146)
TRIGL SERPL-MCNC: 169 MG/DL — HIGH
TSH SERPL-MCNC: 3.01 UIU/ML — SIGNIFICANT CHANGE UP (ref 0.27–4.2)
WBC # BLD: 9.84 K/UL — SIGNIFICANT CHANGE UP (ref 4.8–10.8)
WBC # FLD AUTO: 9.84 K/UL — SIGNIFICANT CHANGE UP (ref 4.8–10.8)

## 2021-10-13 PROCEDURE — 99232 SBSQ HOSP IP/OBS MODERATE 35: CPT

## 2021-10-13 PROCEDURE — 99251: CPT

## 2021-10-13 RX ORDER — RISPERIDONE 4 MG/1
1 TABLET ORAL ONCE
Refills: 0 | Status: COMPLETED | OUTPATIENT
Start: 2021-10-13 | End: 2021-10-13

## 2021-10-13 RX ORDER — PREDNISOLONE SODIUM PHOSPHATE 1 %
1 DROPS OPHTHALMIC (EYE) DAILY
Refills: 0 | Status: DISCONTINUED | OUTPATIENT
Start: 2021-10-13 | End: 2021-10-18

## 2021-10-13 RX ORDER — RISPERIDONE 4 MG/1
1 TABLET ORAL DAILY
Refills: 0 | Status: DISCONTINUED | OUTPATIENT
Start: 2021-10-13 | End: 2021-10-14

## 2021-10-13 RX ADMIN — Medication 1 DROP(S): at 16:28

## 2021-10-13 RX ADMIN — Medication 1 DROP(S): at 16:24

## 2021-10-13 RX ADMIN — RISPERIDONE 1 MILLIGRAM(S): 4 TABLET ORAL at 09:58
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
## 2021-10-13 NOTE — CONSULT NOTE ADULT - PROBLEM SELECTOR RECOMMENDATION 9
After evaluation at this time would continue with ativan prn and see if patient requires multiple doses. If patient gets greater than 4 doses in 24hrs would consider protocol.  supportive care provided  CATCH team involved for aftercare and pt will follow up with aftercare at [facility]. Pt does not want to go to inpatient rehab.
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
## 2021-10-13 NOTE — PROGRESS NOTE BEHAVIORAL HEALTH - NSBHFUPINTERVALHXFT_PSY_A_CORE
Chart reviewed, patient seen and evaluated at bedside this AM. Patient is a 30-year-old male with past psychiatric history of polysubstance abuse (EtOH, MJ) with history of detox/rehabs, bipolar affective disorder, ADHD, ORIN, and multiple prior psych admissions, most recently at [facility] a few months ago who presents due to agitation and threatening his mother on 10/12. Patient states that he hears people talking about him "through the car", saying that he is a womanizer which he states to be a false report and that he is not womanizer. He continued to say this phrase throughout the encounter. He reports that his vision is bad but his hearing is twice as good so he can hear what others are saying about him. He states that he is ready to leave and doesn't want to be here. He reports that his main problem is his severe anxiety which his mother cannot handle. He also reports self-medicaton for his anxiety with daily use of marijuana [about 1/8 can a day and sometimes more depending on how aggravated he gets].  When asked about what he would do after he leaves, he stated that he would go home to get his belongings such as his debit card, live in a shelter for the time being, and go to [place] after receiving his disability check in the beginning of next month. Chart reviewed, patient seen and evaluated at bedside this AM.     On initial encounter patient noted to be laughing to himself. Patient asked what was funny and stated  “I’m not a womanizer”. Patient states that he often hears people talking about him "through the car", saying that he is a womanizer. He states that this happens often when he is walking by a car and stated that the statements are made through the speakers by people he does not know. He reports that his vision is bad but his hearing is twice as good so he can hear what others are saying about him. He continued to say this phrase throughout the encounter. He otherwise denied persecutory delusion at this time.   He states that he is ready to leave and doesn't want to be here. When asked about what he would do after he leaves, he stated that he would go home to get his belongings and live in a shelter for the time being, and go to [place] after receiving his disability check in the beginning of next month.   He reports that his main problem is his severe anxiety which his mother cannot handle. He also reports self-medication for his anxiety with daily use of marijuana [about 1/8 can a day and sometimes more depending on how aggravated he gets], reports that he previously received “Vicodin and another pill” for his anxiety as well as  “high dose Xanax”. Patient did not appear anxious during interview.  Patient denied si/hi.

## 2021-10-13 NOTE — CONSULT NOTE ADULT - SUBJECTIVE AND OBJECTIVE BOX
Pt interviewed, examined and EMR chart reviewed.  Pt admits to drinking only 2 drinks per day over the last 2 weeks. Pt tapered himself down from 1 liter of whiskey. Pt denies any current withdrawal.  Last Drink day of admission Pt admnits to using marijuana but does not want to stop.  Hx of withdrawal   variable periods of sobriety in the past.  Has been in detox before __X___yes,   _____No    SOCIAL HISTORY:    REVIEW OF SYSTEMS:    Constitutional: No fever, weight loss or fatigue  ENT:  No difficulty hearing, tinnitus, vertigo; No sinus or throat pain  Neck: No pain or stiffness  Respiratory: No cough, wheezing, chills or hemoptysis  Cardiovascular: No chest pain, palpitations, shortness of breath, dizziness or leg swelling  Gastrointestinal: No abdominal or epigastric pain. No nausea, vomiting or hematemesis; No diarrhea or constipation. No melena or hematochezia.  Neurological: No headaches, memory loss, loss of strength, numbness or tremors  Musculoskeletal: No joint pain or swelling; No muscle, back or extremity pain  Psychiatric: No depression, anxiety, mood swings or difficulty sleeping    MEDICATIONS  (STANDING):  influenza   Vaccine 0.5 milliLiter(s) IntraMuscular once  prednisoLONE acetate 1% Suspension 1 Drop(s) Left EYE daily  risperiDONE   Tablet 1 milliGRAM(s) Oral daily    MEDICATIONS  (PRN):  artificial tears (preservative free) Ophthalmic Solution 1 Drop(s) Left EYE every 8 hours PRN s/p cornea transplant  haloperidol     Tablet 5 milliGRAM(s) Oral every 6 hours PRN agitation  hydrOXYzine hydrochloride 50 milliGRAM(s) Oral every 6 hours PRN anxiety/ insomnia  LORazepam     Tablet 2 milliGRAM(s) Oral every 6 hours PRN Combative behavior  LORazepam     Tablet 2 milliGRAM(s) Oral every 6 hours PRN objective signs of alcohol withdrawal      Vital Signs Last 24 Hrs  T(C): 36.3 (13 Oct 2021 15:32), Max: 36.3 (13 Oct 2021 15:32)  T(F): 97.3 (13 Oct 2021 15:32), Max: 97.3 (13 Oct 2021 15:32)  HR: 80 (13 Oct 2021 15:32) (80 - 105)  BP: 126/74 (13 Oct 2021 15:32) (122/81 - 135/78)  BP(mean): --  RR: 17 (13 Oct 2021 15:32) (16 - 18)  SpO2: --    PHYSICAL EXAM:    Constitutional: NAD, well-groomed, well-developed  HEENT: PERRLA, EOMI, Normal Hearing, MMM  Neck: No LAD, No JVD  Back: Normal spine flexure, No CVA tenderness  Respiratory: CTAB/L  Cardiovascular: S1 and S2, RRR, no M/G/R  Gastrointestinal: BS+, soft, NT/ND  Extremities: No peripheral edema  Vascular: 2+ peripheral pulses  Neurological: A/O x 3, no focal deficits    LABS:                        16.4   9.84  )-----------( 324      ( 13 Oct 2021 11:39 )             48.0     10-13    136  |  98  |  14  ----------------------------<  145<H>  4.4   |  27  |  0.9    Ca    9.9      13 Oct 2021 11:39    TPro  7.1  /  Alb  4.6  /  TBili  0.5  /  DBili  x   /  AST  29  /  ALT  25  /  AlkPhos  86  10-13      Urinalysis Basic - ( 12 Oct 2021 07:30 )    Color: Yellow / Appearance: Clear / SG: >=1.030 / pH: x  Gluc: x / Ketone: Negative  / Bili: Negative / Urobili: 0.2 mg/dL   Blood: x / Protein: Negative mg/dL / Nitrite: Negative   Leuk Esterase: Negative / RBC: x / WBC x   Sq Epi: x / Non Sq Epi: x / Bacteria: x      Drug Screen Urine:  Alcohol Level  Alcohol, Blood: <10 mg/dL (10-11-21 @ 15:20)        RADIOLOGY & ADDITIONAL STUDIES:

## 2021-10-14 LAB
COVID-19 SPIKE DOMAIN AB INTERP: POSITIVE
COVID-19 SPIKE DOMAIN ANTIBODY RESULT: 12.5 U/ML — HIGH
SARS-COV-2 IGG+IGM SERPL QL IA: 12.5 U/ML — HIGH
SARS-COV-2 IGG+IGM SERPL QL IA: POSITIVE
SARS-COV-2 RNA SPEC QL NAA+PROBE: SIGNIFICANT CHANGE UP

## 2021-10-14 PROCEDURE — 99232 SBSQ HOSP IP/OBS MODERATE 35: CPT

## 2021-10-14 RX ADMIN — RISPERIDONE 1 MILLIGRAM(S): 4 TABLET ORAL at 08:47

## 2021-10-14 RX ADMIN — Medication 1 DROP(S): at 08:47

## 2021-10-14 RX ADMIN — Medication 2 MILLIGRAM(S): at 22:53

## 2021-10-14 NOTE — PROGRESS NOTE BEHAVIORAL HEALTH - SUMMARY
Russell is a 30 year-old man, single, 2 kids ( and 11 yo, not in his custody), tenuously domiciled w/ mom (was kicked out of a shelter last week for behavioral disturbances), PPH of PSA (EtOH, MJ), Bipolar disorder, ADHD, ORIN, multiple prior psych admissions, most recently at [facility] a few months ago, h/o detox/rehabs, he denies past suicide attempts, however per mom he frequently threatens suicide, h/o violence towards his mother, past OOP, nonadherent w/ outpt psychiatric care, PMH of corneal transplant, BIBEMS a/b neighbors after he was threatening his mother earlier today.    Evaluation this AM found patient to be calm cooperative. Patient notably more linear in thought and no longer feeling paranoid regarding other people talking to him. Patient currently requires inpatient psychiatric admission and medication management for safety and stabilization before considering discharge.    # Psychosis NOS  -Risperdal 1mg QD, monitor for side effects/symptoms   -For acute agitation not responsive to verbal redirection may give Haldol 5mg po q6 prn for agitation along with Ativan 2mg po q6 prn for combative behavior with escalation to IM if patient becomes a danger to him/herself/others or property.

## 2021-10-14 NOTE — PROGRESS NOTE BEHAVIORAL HEALTH - NSBHFUPINTERVALHXFT_PSY_A_CORE
Chart reviewed, patient seen and evaluated at bedside this AM. As per nursing, no acute events overnight.     On encounter patient states that he had nightmares attributing it to the Risperdal 1mg that was started yesterday. He states that he has never had vivid dreams since he was 21 and for as long as he can remember. He agrees to try the medication once more today at the same dose before a possible reduction in the dose. Other than the nightmares, his sleep was continuous throughout the night and his appetite was normal. He denies feeling like other people are talking about him today. He also denies any persecutory delusions. He reports that his father is coming to visit later today to drop off his mother so he can get the rest of his belongings.  He stated that he would like to stay in a shelter in [place] after discharge, and eventually go to [place] after receiving his disability check in the beginning of next month. Patient did not appear anxious during the encounter.  Patient denied si/hi. Chart reviewed, patient seen and evaluated at bedside this AM. As per nursing, no acute events overnight.     On encounter patient states that he had nightmares attributing it to the Risperdal 1mg that was started yesterday. He states that he has never had vivid dreams since he was 21 and for as long as he can remember. He agrees to try the medication once more today at the same dose before a possible reduction in the dose. Other than the nightmares, his sleep was continuous throughout the night and his appetite was normal. He denies feeling like other people are talking about him today. He denied auditory or visual hallucinations. He also denies any persecutory delusions. He reports that his father is coming to visit later today to drop off his mother so he can get the rest of his belongings.  He stated that he would like to stay in a shelter in [place] after discharge, and eventually go to [place] after receiving his disability check in the beginning of next month. Patient did not appear anxious during the encounter.  Patient denied si/hi.    Team spoke with father via telephone. Father, Mr. Law, reported that he was able to speak to patient and reports that patient is "much better". Father reported that he(father) is attempting to find residence for the patient to live alone or with him elsewhere as the father's landlord is not allowing Russell to live at said residence. Father reports that he does not remember if he has an order of protection against the patient. Father at this time reported no acute safety concerns.

## 2021-10-14 NOTE — PROGRESS NOTE BEHAVIORAL HEALTH - NSBHADMITIPREASONDETAILS_PSY_A_CORE FT
Danger to self and others.
danger to self and others, patient unable to care for self, mother reporting hx of aggressive behavior.

## 2021-10-15 PROCEDURE — 99231 SBSQ HOSP IP/OBS SF/LOW 25: CPT

## 2021-10-15 RX ORDER — RISPERIDONE 4 MG/1
1 TABLET ORAL AT BEDTIME
Refills: 0 | Status: DISCONTINUED | OUTPATIENT
Start: 2021-10-15 | End: 2021-10-18

## 2021-10-15 RX ORDER — HYDROXYZINE HCL 10 MG
25 TABLET ORAL EVERY 6 HOURS
Refills: 0 | Status: DISCONTINUED | OUTPATIENT
Start: 2021-10-15 | End: 2021-10-18

## 2021-10-15 RX ADMIN — Medication 1 DROP(S): at 08:40

## 2021-10-15 RX ADMIN — Medication 50 MILLIGRAM(S): at 08:40

## 2021-10-15 RX ADMIN — RISPERIDONE 1 MILLIGRAM(S): 4 TABLET ORAL at 20:36

## 2021-10-15 NOTE — PROGRESS NOTE BEHAVIORAL HEALTH - CASE SUMMARY
Pt is more goal directed and logical, and less psychotic.  Will monitor sx; ?side effect of nightmares .  Pt's father is looking into getting an apartment for pt, as at this time he cannot go home or stay with father.
Pt is calmer today and more organized, but appears psychotic.  He took PO risperdal and says he feels 'more relaxed".  No s/h ideation. Disposition uncertain, as mother says he cannot return home and that she has order of protection against him. Will continie to monitor. Pt able to contract for safety; off 1:1.  Will restart eye drops
Improvement as noted.  Attempting now to coordinate d/c planning and determine where pt will live on d/c.

## 2021-10-15 NOTE — PROGRESS NOTE BEHAVIORAL HEALTH - SUMMARY
Russell is a 30 year-old man, single, 2 kids ( and 11 yo, not in his custody), tenuously domiciled w/ mom (was kicked out of a shelter last week for behavioral disturbances), PPH of PSA (EtOH, MJ), Bipolar disorder, ADHD, ORIN, multiple prior psych admissions, most recently at [facility] a few months ago, h/o detox/rehabs, he denies past suicide attempts, however per mom he frequently threatens suicide, h/o violence towards his mother, past OOP, nonadherent w/ outpt psychiatric care, PMH of corneal transplant, BIBEMS a/b neighbors after he was threatening his mother.    Evaluation this AM found patient to be calm cooperative. Patient appears to be doing better and is more linear in thought and no longer feeling paranoid regarding other people talking to him. Patient currently requires inpatient psychiatric admission and medication management for safety and stabilization before considering discharge.    # Psychosis NOS  -Risperdal 1mg QD, monitor for side effects/symptoms   -For acute agitation not responsive to verbal redirection may give Haldol 5mg po q6 prn for agitation along with Ativan 2mg po q6 prn for combative behavior with escalation to IM if patient becomes a danger to him/herself/others or property. Russell is a 30 year-old man, single, 2 kids ( and 11 yo, not in his custody), tenuously domiciled w/ mom (was kicked out of a shelter last week for behavioral disturbances), PPH of PSA (EtOH, MJ), Bipolar disorder, ADHD, ORIN, multiple prior psych admissions, most recently at [facility] a few months ago, h/o detox/rehabs, he denies past suicide attempts, however per mom he frequently threatens suicide, h/o violence towards his mother, past OOP, nonadherent w/ outpt psychiatric care, PMH of corneal transplant, BIBEMS a/b neighbors after he was threatening his mother.    Evaluation this AM found patient to be calm cooperative. Patient appears to be doing better and is more linear in thought and no longer feeling paranoid regarding other people talking to him, however patient mildly grandiose and perseverative on discharge with no safe discharge available. Patient has yet to demonstrate sufficient ability to care for self. Patient currently requires inpatient psychiatric admission and medication management for safety and stabilization    # Psychosis NOS  - Risperdal 1mg QHS, monitor for side effects/symptoms   - Atarax 25mg po q6 prn anxiety/ insomnia   - For acute agitation not responsive to verbal redirection may give Haldol 5mg po q6 prn for agitation along with Ativan 2mg po q6 prn for combative behavior with escalation to IM if patient becomes a danger to him/herself/others or property.

## 2021-10-15 NOTE — PROGRESS NOTE BEHAVIORAL HEALTH - MODIFICATIONS
seen and discussed with resident.

## 2021-10-15 NOTE — PROGRESS NOTE BEHAVIORAL HEALTH - NSBHFUPINTERVALHXFT_PSY_A_CORE
Chart reviewed, patient seen and evaluated at bedside this AM. As per nursing, no acute events overnight.  On encounter patient seen sleeping in bed appearing drowsy. He states that required Ativan 2mg for alcohol withdrawal symptoms last night and Atarax 50 mg this AM for anxiety. He states that he had no nightmares last night and slept well. He is compliant and tolerating medications. Complains of medication [possibly the Ativan] "knocking him out" and making him feel tired. He denies feeling like other people are talking about him. He denied auditory or visual hallucinations. He also denies any persecutory delusions. Patient denied si/hi.    Patient wishes to be discharged soon so he can work with his uncle, Charles, in [facility]. Also, reported that his uncle will be picking him up at discharge. Patient was later seen talking to his mother on the phone and asked us if we can give his mother a call saying that he is doing better with the medications he is taking. Chart reviewed, patient seen and evaluated at bedside this AM. As per nursing, no acute events overnight.  On encounter patient seen sleeping in bed appearing drowsy. He states that required Ativan 2mg for alcohol withdrawal symptoms last night and Atarax 50 mg this AM for anxiety. He states that he had no nightmares last night and slept well. He is compliant and tolerating medications. Complains of medication [possibly the Ativan] "knocking him out" and making him feel tired. He denies feeling like other people are talking about him. He denied auditory or visual hallucinations. He also denies any persecutory delusions. Patient denied si/hi.    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 Also, reported that his uncle will be picking him up at discharge. However, Team spoke to patient's mother via telephone and she stated the uncle is unwilling to take the patient and he is being pushy about it.  She reported improvement saying that patient sounds better and that he's not cursing at her anymore. She expressed some concern about patient smoking weed again after discharge and possibly decompensate again. Chart reviewed, patient seen and evaluated at bedside this AM. As per nursing, no acute events overnight.    On encounter patient seen sleeping in bed appearing drowsy. He states that required Ativan 2mg for alcohol withdrawal symptoms last night and Atarax 50 mg this AM for anxiety. No objective signs of withdrawal noted during conversation. Patient reported that the medication this morning “knocked me out” and is making him feel tired. He states that he had no nightmares last night and slept well. Patient reported that he is better, reporting that he is taking his medication and denied avh, denied persecutory delusions, denied fears of other’s talking about him but offered that “all the girls in here are giving me their numbers”. 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 Patient denied si/hi.    Team spoke to patient's mother via telephone and she stated that the uncle is unwilling to take the patient, stating that the patient tends to be persistent and uncle instead offered to find patient a place to live but mother states that xiao did not offer to have patient live with him. Mother reported improvement, stating that patient sounds better and that he's not cursing at her anymore. She expressed some concern about patient smoking marijuana again after discharge and possibly decompensate again.    Spoke with uncle Xiao [phone] who reported that he has been speaking with Russell and reports that patient sounds calmer than on presentation. Xiao reported that he is willing to help Russell and is looking into a subsidized housing program in [facility] for Russell to live at independently but reported that Russell cannot live with him as he owns firearms and Russell can be unpredictable at times. Uncle is concerned patient may not be med compliant up discharge.

## 2021-10-16 PROCEDURE — 99232 SBSQ HOSP IP/OBS MODERATE 35: CPT

## 2021-10-16 RX ORDER — IBUPROFEN 200 MG
400 TABLET ORAL EVERY 8 HOURS
Refills: 0 | Status: DISCONTINUED | OUTPATIENT
Start: 2021-10-16 | End: 2021-10-18

## 2021-10-16 RX ADMIN — Medication 2 MILLIGRAM(S): at 10:41

## 2021-10-16 RX ADMIN — RISPERIDONE 1 MILLIGRAM(S): 4 TABLET ORAL at 20:35

## 2021-10-16 RX ADMIN — Medication 400 MILLIGRAM(S): at 11:27

## 2021-10-16 RX ADMIN — Medication 1 DROP(S): at 08:30

## 2021-10-16 RX ADMIN — Medication 1 DROP(S): at 20:33

## 2021-10-16 RX ADMIN — Medication 25 MILLIGRAM(S): at 08:36

## 2021-10-16 NOTE — PROGRESS NOTE BEHAVIORAL HEALTH - NSBHFUPINTERVALHXFT_PSY_A_CORE
Chart reviewed, patient seen and evaluated at bedside this AM. As per nursing, no acute events overnight. Pt has been taking medications, cooperative, although at times anxious. Pt approached me in hallways asking "Am I leaving on Monday or Tuesday?" and was interviewed in his room. He reports "feeling better, happy, excited to leave soon, calm and more social". He states "My mom said it is fine for me to go home". He denies SI/HI, and once asked about AH he states "I do not hear voices, this is fake, people in my neighborhood are telling lies about me because they are jealous of my music."

## 2021-10-16 NOTE — PROGRESS NOTE BEHAVIORAL HEALTH - SUMMARY
Russell is a 30 year-old man, single, 2 kids ( and 13 yo, not in his custody), tenuously domiciled w/ mom (was kicked out of a shelter last week for behavioral disturbances), PPH of PSA (EtOH, MJ), Bipolar disorder, ADHD, ORIN, multiple prior psych admissions, most recently at [facility] a few months ago, h/o detox/rehabs, he denies past suicide attempts, however per mom he frequently threatens suicide, h/o violence towards his mother, past OOP, nonadherent w/ outpt psychiatric care, PMH of corneal transplant, BIBEMS a/b neighbors after he was threatening his mother.        # Psychosis NOS  - Risperdal 1mg QHS, monitor for side effects/symptoms   - Atarax 25mg po q6 prn anxiety/ insomnia   - For acute agitation not responsive to verbal redirection may give Haldol 5mg po q6 prn for agitation along with Ativan 2mg po q6 prn for combative behavior with escalation to IM if patient becomes a danger to him/herself/others or property.

## 2021-10-17 RX ADMIN — Medication 1 DROP(S): at 08:10

## 2021-10-17 RX ADMIN — RISPERIDONE 1 MILLIGRAM(S): 4 TABLET ORAL at 20:16

## 2021-10-17 RX ADMIN — Medication 400 MILLIGRAM(S): at 06:55

## 2021-10-17 RX ADMIN — Medication 2 MILLIGRAM(S): at 22:02

## 2021-10-17 NOTE — PROGRESS NOTE BEHAVIORAL HEALTH - SUMMARY
Russell is a 30 year-old man, single, 2 kids ( and 11 yo, not in his custody), tenuously domiciled w/ mom (was kicked out of a shelter last week for behavioral disturbances), PPH of PSA (EtOH, MJ), Bipolar disorder, ADHD, ORIN, multiple prior psych admissions, most recently at [facility] a few months ago, h/o detox/rehabs, he denies past suicide attempts, however per mom he frequently threatens suicide, h/o violence towards his mother, past OOP, nonadherent w/ outpt psychiatric care, PMH of corneal transplant, BIBEMS a/b neighbors after he was threatening his mother.        # Psychosis NOS  - Risperdal 1mg QHS, monitor for side effects/symptoms   - Atarax 25mg po q6 prn anxiety/ insomnia   - For acute agitation not responsive to verbal redirection may give Haldol 5mg po q6 prn for agitation along with Ativan 2mg po q6 prn for combative behavior with escalation to IM if patient becomes a danger to him/herself/others or property.

## 2021-10-17 NOTE — PROGRESS NOTE BEHAVIORAL HEALTH - NSBHFUPINTERVALCCFT_PSY_A_CORE
"Am I leaving on Monday ?"
"I want to go home"
"I'm doing better but I had some alcohol withdrawal last night"
"I'm not a womanizer"
"Am I leaving on Monday or Tuesday?"
"I'm having nightmares"

## 2021-10-17 NOTE — PROGRESS NOTE BEHAVIORAL HEALTH - SECONDARY DX1
Marijuana abuse, continuous
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
## 2021-10-17 NOTE — PROGRESS NOTE BEHAVIORAL HEALTH - NSBHATTESTSEENBY_PSY_A_CORE
Attending Psychiatrist supervising NP/Trainee, meeting pt...
attending Psychiatrist without NP/Trainee
Attending Psychiatrist supervising NP/Trainee, meeting pt...
Attending Psychiatrist supervising NP/Trainee, meeting pt...

## 2021-10-17 NOTE — PROGRESS NOTE BEHAVIORAL HEALTH - NSBHFUPINTERVALHXFT_PSY_A_CORE
Chart reviewed, patient seen and evaluated at bedside this AM. As per nursing, no acute events overnight. Pt has been taking medications, cooperative, although at times anxious. He reports "feeling better, happy, excited to leave soon, calm and more social". . He denies SI/HI,, denies a/v hallucinations.

## 2021-10-17 NOTE — PROGRESS NOTE BEHAVIORAL HEALTH - NSBHPTASSESSDT_PSY_A_CORE
17-Oct-2021 22:41
14-Oct-2021 10:02
12-Oct-2021 12:37
13-Oct-2021 09:46
16-Oct-2021 12:38
15-Oct-2021 10:36

## 2021-10-17 NOTE — PROGRESS NOTE BEHAVIORAL HEALTH - NSBHADMITIPOBS_PSY_A_CORE
Routine observation
Constant observation
Routine observation
Constant observation

## 2021-10-17 NOTE — PROGRESS NOTE BEHAVIORAL HEALTH - GAIT / STATION
Normal gait / station
Other
Normal gait / station

## 2021-10-17 NOTE — PROGRESS NOTE BEHAVIORAL HEALTH - NSBHCHARTREVIEWVS_PSY_A_CORE FT
Vital Signs Last 24 Hrs  T(C): 36 (15 Oct 2021 08:00), Max: 36.7 (14 Oct 2021 15:32)  T(F): 96.8 (15 Oct 2021 08:00), Max: 98 (14 Oct 2021 15:32)  HR: 73 (15 Oct 2021 08:00) (73 - 90)  BP: 124/79 (15 Oct 2021 08:00) (124/79 - 140/88)  BP(mean): --  RR: 18 (15 Oct 2021 08:00) (18 - 20)  SpO2: --
Vital Signs Last 24 Hrs  T(C): 35.6 (13 Oct 2021 08:00), Max: 36.6 (12 Oct 2021 13:43)  T(F): 96.1 (13 Oct 2021 08:00), Max: 97.9 (12 Oct 2021 13:43)  HR: 105 (13 Oct 2021 08:00) (80 - 105)  BP: 135/78 (13 Oct 2021 08:00) (122/81 - 140/90)  BP(mean): --  RR: 18 (13 Oct 2021 08:00) (16 - 18)  SpO2: --
Vital Signs Last 24 Hrs  T(C): 35.6 (16 Oct 2021 08:34), Max: 35.9 (16 Oct 2021 06:05)  T(F): 96.1 (16 Oct 2021 08:34), Max: 96.6 (16 Oct 2021 06:05)  HR: 101 (16 Oct 2021 08:34) (84 - 104)  BP: 112/85 (16 Oct 2021 08:34) (112/85 - 156/90)  BP(mean): --  RR: 20 (16 Oct 2021 08:34) (19 - 20)  SpO2: --
Vital Signs Last 24 Hrs  T(C): 35.5 (14 Oct 2021 08:00), Max: 36.8 (14 Oct 2021 05:50)  T(F): 95.9 (14 Oct 2021 08:00), Max: 98.3 (14 Oct 2021 05:50)  HR: 68 (14 Oct 2021 08:00) (68 - 80)  BP: 121/97 (14 Oct 2021 08:00) (112/61 - 126/74)  BP(mean): --  RR: 18 (14 Oct 2021 08:00) (17 - 18)  SpO2: --
Vital Signs Last 24 Hrs  T(C): 36.7 (17 Oct 2021 15:36), Max: 36.7 (17 Oct 2021 15:36)  T(F): 98 (17 Oct 2021 15:36), Max: 98 (17 Oct 2021 15:36)  HR: 86 (17 Oct 2021 15:36) (86 - 100)  BP: 133/83 (17 Oct 2021 15:36) (115/69 - 133/83)  BP(mean): --  RR: 18 (17 Oct 2021 15:36) (18 - 18)  SpO2: --
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
## 2021-10-17 NOTE — PROGRESS NOTE BEHAVIORAL HEALTH - NSBHADMITIPOBSFT_PSY_A_CORE
unit protocol
aggression and elopement risk
unit protocol
CO while in the ED

## 2021-10-17 NOTE — PROGRESS NOTE BEHAVIORAL HEALTH - AXIS III
corneal transplant

## 2021-10-18 VITALS
DIASTOLIC BLOOD PRESSURE: 74 MMHG | SYSTOLIC BLOOD PRESSURE: 128 MMHG | TEMPERATURE: 97 F | HEART RATE: 110 BPM | RESPIRATION RATE: 18 BRPM

## 2021-10-18 PROCEDURE — 99238 HOSP IP/OBS DSCHRG MGMT 30/<: CPT

## 2021-10-18 RX ORDER — PREDNISOLONE SODIUM PHOSPHATE 1 %
1 DROPS OPHTHALMIC (EYE)
Qty: 0 | Refills: 0 | DISCHARGE
Start: 2021-10-18

## 2021-10-18 RX ORDER — RISPERIDONE 4 MG/1
1 TABLET ORAL
Qty: 14 | Refills: 0
Start: 2021-10-18 | End: 2021-10-31

## 2021-10-18 RX ORDER — HYDROXYZINE HCL 10 MG
1 TABLET ORAL
Qty: 7 | Refills: 0
Start: 2021-10-18 | End: 2021-10-24

## 2021-10-18 NOTE — DISCHARGE NOTE BEHAVIORAL HEALTH - NSBHDCSUBSTHXFT_PSY_A_CORE
Patient states that he drinks "2 airplanes" of liquor daily, with his last drink being yesterday before his arrival to the ED. He admits to history of having the "shakes", but no participation in previous detox programs. Smokes marijuana regularly and cigarettes when he has no access to marijuana. Denies other illicit drug use.

## 2021-10-18 NOTE — DISCHARGE NOTE BEHAVIORAL HEALTH - NSBHDCVIOLFCTRMIT_PSY_A_CORE
patient educated on substance use contributing to hospitalization. Patient reported understanding. Patient agreeable to outpatient follow up and to continue taking medications

## 2021-10-18 NOTE — DISCHARGE NOTE BEHAVIORAL HEALTH - PAST PSYCHIATRIC HISTORY
Patient reported unclear history, including anxiety and treatment of anxiety with valium. Mother does not know of diagnosed history.

## 2021-10-18 NOTE — DISCHARGE NOTE BEHAVIORAL HEALTH - NSBHDCMEDSFT_PSY_A_CORE
Initial encounter found patient to be irritable, disorganized, paranoid and stating that he had felt anxious at times. Patient was started on risperidone 1mg daily but due to sedation, medication was given at night. Patient found to have good response as patient became significantly less irritable, found to be more linear in thought and was no longer paranoid. Patient additionally reported improvement of anxiety during stay.

## 2021-10-18 NOTE — DISCHARGE NOTE BEHAVIORAL HEALTH - MEDICATION SUMMARY - MEDICATIONS TO TAKE
I will START or STAY ON the medications listed below when I get home from the hospital:    risperiDONE 1 mg oral tablet  -- 1 tab(s) by mouth once a day (at bedtime) x 14 days   -- Indication: For Psychosis/ anxiety    hydrOXYzine hydrochloride 25 mg oral tablet  -- 1 tab(s) by mouth once a day x 7 days, As Needed -anxiety/ insomnia - for anxiety   -- Indication: For Anxiety/ insomnia    ocular lubricant ophthalmic solution  -- 1 drop(s) to each affected eye every 8 hours, As needed until stopped by MD  -- Indication: For corneal transplant    prednisoLONE acetate 1% ophthalmic suspension  -- 1 drop(s) to each affected eye once a day until stopped by MD  -- Indication: For corneal transplant
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
## 2021-10-18 NOTE — DISCHARGE NOTE BEHAVIORAL HEALTH - NSBHDCRESPONSEFT_PSY_A_CORE
Pt has made significant progress over the course of hospitalization. With continuous psychotherapy from the treatment team and the medications, patient reports feeling better, sleeping and eating well, was no longer irritable, was found to be no longer paranoid. Thought process became organized and insight improved. Pt was calm and cooperative and was in good behavioral control. Patient denied any suicidal or homicidal ideations. Patient denied any auditory or visual hallucinations. Pt was evaluated by treatment team, pt is stable for discharge and patient shows no imminent danger to self, others or property at this time. Pt understands and agrees with treatment plan and following up with outpatient. Psychoeducation provided regarding diagnosis, medications, treatment and follow up. Risks, benefits, alternatives discussed, all questions and concerns addressed and answered. Smoking Cessation Smoking Cessation/Coumadin/Warfarin

## 2021-10-18 NOTE — CHART NOTE - NSCHARTNOTEFT_GEN_A_CORE
Social Work Discharge Note:    Patient is for discharge today.   He is alert and oriented x3.  Mood has improved.  Anxiety has decreased.  Insight and judgment have improved.  Suicidal/homicidal ideation denied.     Patient will be discharged to his home in [place] with his mother.  He has been referred to [facility] for outpatient KARLA treatment.    Patient is aware and agreeable to discharge today.

## 2021-10-18 NOTE — DISCHARGE NOTE BEHAVIORAL HEALTH - FAMILY HISTORY OF PSYCHIATRIC ILLNESS
30-year-old male with past psychiatric history of polysubstance abuse (EtOH, MJ) with history of detox/rehabs, unemployed, will live with mother

## 2021-10-18 NOTE — CHART NOTE - NSCHARTNOTEFT_GEN_A_CORE
Pt was d/c'd today with f/u in place.  He expressed understanding of need to comply with meds and f/u and abstain from so much marijuana use.    Pt's mother was in agreement with plan.  We confirmed that she was comfortable with d/c after having spoken with pt and "going over the rules". No order of protection was in place per mother

## 2021-10-18 NOTE — DISCHARGE NOTE BEHAVIORAL HEALTH - NSTOBACCOREFERRAL_GEN_A_NCS
Patient declined information Patient registered and referred to the [place] [facility] program. Phone appointment scheduled for 10/20/21 at 0900./Yes

## 2021-10-21 ENCOUNTER — APPOINTMENT (OUTPATIENT)
Dept: PSYCHIATRY | Facility: CLINIC | Age: 31
End: 2021-10-21

## 2021-10-22 RX ORDER — RISPERIDONE 4 MG/1
1 TABLET ORAL
Qty: 14 | Refills: 1
Start: 2021-10-22 | End: 2021-11-18

## 2021-10-22 RX ORDER — HYDROXYZINE HCL 10 MG
1 TABLET ORAL
Qty: 14 | Refills: 1
Start: 2021-10-22 | End: 2021-11-18

## 2021-10-24 DIAGNOSIS — F29 UNSPECIFIED PSYCHOSIS NOT DUE TO A SUBSTANCE OR KNOWN PHYSIOLOGICAL CONDITION: ICD-10-CM

## 2021-10-24 DIAGNOSIS — Z88.0 ALLERGY STATUS TO PENICILLIN: ICD-10-CM

## 2021-10-24 DIAGNOSIS — F12.150 CANNABIS ABUSE WITH PSYCHOTIC DISORDER WITH DELUSIONS: ICD-10-CM

## 2021-10-24 DIAGNOSIS — F10.20 ALCOHOL DEPENDENCE, UNCOMPLICATED: ICD-10-CM

## 2021-10-24 DIAGNOSIS — F31.9 BIPOLAR DISORDER, UNSPECIFIED: ICD-10-CM

## 2021-10-24 DIAGNOSIS — F19.94 OTHER PSYCHOACTIVE SUBSTANCE USE, UNSPECIFIED WITH PSYCHOACTIVE SUBSTANCE-INDUCED MOOD DISORDER: ICD-10-CM

## 2021-10-24 DIAGNOSIS — F90.9 ATTENTION-DEFICIT HYPERACTIVITY DISORDER, UNSPECIFIED TYPE: ICD-10-CM

## 2021-10-24 DIAGNOSIS — Z94.7 CORNEAL TRANSPLANT STATUS: ICD-10-CM

## 2021-10-24 DIAGNOSIS — F13.94 SEDATIVE, HYPNOTIC OR ANXIOLYTIC USE, UNSPECIFIED WITH SEDATIVE, HYPNOTIC OR ANXIOLYTIC-INDUCED MOOD DISORDER: ICD-10-CM

## 2021-10-24 DIAGNOSIS — J45.909 UNSPECIFIED ASTHMA, UNCOMPLICATED: ICD-10-CM

## 2021-10-24 DIAGNOSIS — R45.851 SUICIDAL IDEATIONS: ICD-10-CM

## 2021-10-24 DIAGNOSIS — G47.00 INSOMNIA, UNSPECIFIED: ICD-10-CM

## 2021-10-24 DIAGNOSIS — F41.1 GENERALIZED ANXIETY DISORDER: ICD-10-CM

## 2021-10-24 DIAGNOSIS — F17.210 NICOTINE DEPENDENCE, CIGARETTES, UNCOMPLICATED: ICD-10-CM

## 2021-10-26 DIAGNOSIS — Z56.0 UNEMPLOYMENT, UNSPECIFIED: ICD-10-CM

## 2021-10-26 DIAGNOSIS — F10.239 ALCOHOL DEPENDENCE WITH WITHDRAWAL, UNSPECIFIED: ICD-10-CM

## 2021-10-26 DIAGNOSIS — R45.850 HOMICIDAL IDEATIONS: ICD-10-CM
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
## 2021-10-26 DIAGNOSIS — Z91.14 PATIENT'S OTHER NONCOMPLIANCE WITH MEDICATION REGIMEN: ICD-10-CM

## 2021-10-26 DIAGNOSIS — F12.10 CANNABIS ABUSE, UNCOMPLICATED: ICD-10-CM

## 2021-10-26 DIAGNOSIS — Z81.3 FAMILY HISTORY OF OTHER PSYCHOACTIVE SUBSTANCE ABUSE AND DEPENDENCE: ICD-10-CM

## 2021-10-26 DIAGNOSIS — Z91.19 PATIENT'S NONCOMPLIANCE WITH OTHER MEDICAL TREATMENT AND REGIMEN: ICD-10-CM

## 2021-10-26 DIAGNOSIS — Z91.51 PERSONAL HISTORY OF SUICIDAL BEHAVIOR: ICD-10-CM

## 2021-10-26 SDOH — ECONOMIC STABILITY - INCOME SECURITY: UNEMPLOYMENT, UNSPECIFIED: Z56.0

## 2021-11-03 ENCOUNTER — EMERGENCY (EMERGENCY)
Facility: HOSPITAL | Age: 31
LOS: 0 days | Discharge: HOME | End: 2021-11-03
Attending: EMERGENCY MEDICINE | Admitting: EMERGENCY MEDICINE
Payer: MEDICAID

## 2021-11-03 VITALS
RESPIRATION RATE: 18 BRPM | SYSTOLIC BLOOD PRESSURE: 136 MMHG | TEMPERATURE: 97 F | OXYGEN SATURATION: 97 % | DIASTOLIC BLOOD PRESSURE: 79 MMHG | HEIGHT: 67 IN | HEART RATE: 67 BPM | WEIGHT: 175.05 LBS

## 2021-11-03 DIAGNOSIS — Z94.7 CORNEAL TRANSPLANT STATUS: Chronic | ICD-10-CM

## 2021-11-03 DIAGNOSIS — F10.129 ALCOHOL ABUSE WITH INTOXICATION, UNSPECIFIED: ICD-10-CM

## 2021-11-03 DIAGNOSIS — F17.200 NICOTINE DEPENDENCE, UNSPECIFIED, UNCOMPLICATED: ICD-10-CM
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
## 2021-11-03 DIAGNOSIS — R45.1 RESTLESSNESS AND AGITATION: ICD-10-CM

## 2021-11-03 DIAGNOSIS — F90.8 ATTENTION-DEFICIT HYPERACTIVITY DISORDER, OTHER TYPE: ICD-10-CM

## 2021-11-03 DIAGNOSIS — Z20.822 CONTACT WITH AND (SUSPECTED) EXPOSURE TO COVID-19: ICD-10-CM

## 2021-11-03 DIAGNOSIS — F41.9 ANXIETY DISORDER, UNSPECIFIED: ICD-10-CM

## 2021-11-03 DIAGNOSIS — F32.A DEPRESSION, UNSPECIFIED: ICD-10-CM

## 2021-11-03 DIAGNOSIS — Z88.0 ALLERGY STATUS TO PENICILLIN: ICD-10-CM

## 2021-11-03 LAB
ALBUMIN SERPL ELPH-MCNC: 4.9 G/DL — SIGNIFICANT CHANGE UP (ref 3.5–5.2)
ALP SERPL-CCNC: 82 U/L — SIGNIFICANT CHANGE UP (ref 30–115)
ALT FLD-CCNC: 18 U/L — SIGNIFICANT CHANGE UP (ref 0–41)
AMPHET UR-MCNC: NEGATIVE — SIGNIFICANT CHANGE UP
ANION GAP SERPL CALC-SCNC: 13 MMOL/L — SIGNIFICANT CHANGE UP (ref 7–14)
APAP SERPL-MCNC: <5 UG/ML — LOW (ref 10–30)
AST SERPL-CCNC: 21 U/L — SIGNIFICANT CHANGE UP (ref 0–41)
BARBITURATES UR SCN-MCNC: NEGATIVE — SIGNIFICANT CHANGE UP
BASOPHILS # BLD AUTO: 0.09 K/UL — SIGNIFICANT CHANGE UP (ref 0–0.2)
BASOPHILS NFR BLD AUTO: 0.8 % — SIGNIFICANT CHANGE UP (ref 0–1)
BENZODIAZ UR-MCNC: NEGATIVE — SIGNIFICANT CHANGE UP
BILIRUB SERPL-MCNC: 0.4 MG/DL — SIGNIFICANT CHANGE UP (ref 0.2–1.2)
BUN SERPL-MCNC: 11 MG/DL — SIGNIFICANT CHANGE UP (ref 10–20)
CALCIUM SERPL-MCNC: 10 MG/DL — SIGNIFICANT CHANGE UP (ref 8.5–10.1)
CHLORIDE SERPL-SCNC: 104 MMOL/L — SIGNIFICANT CHANGE UP (ref 98–110)
CO2 SERPL-SCNC: 22 MMOL/L — SIGNIFICANT CHANGE UP (ref 17–32)
COCAINE METAB.OTHER UR-MCNC: NEGATIVE — SIGNIFICANT CHANGE UP
CREAT SERPL-MCNC: 0.8 MG/DL — SIGNIFICANT CHANGE UP (ref 0.7–1.5)
DRUG SCREEN 1, URINE RESULT: SIGNIFICANT CHANGE UP
EOSINOPHIL # BLD AUTO: 0.11 K/UL — SIGNIFICANT CHANGE UP (ref 0–0.7)
EOSINOPHIL NFR BLD AUTO: 1 % — SIGNIFICANT CHANGE UP (ref 0–8)
ETHANOL SERPL-MCNC: 90 MG/DL — HIGH
GLUCOSE SERPL-MCNC: 86 MG/DL — SIGNIFICANT CHANGE UP (ref 70–99)
HCT VFR BLD CALC: 48.4 % — SIGNIFICANT CHANGE UP (ref 42–52)
HGB BLD-MCNC: 16.8 G/DL — SIGNIFICANT CHANGE UP (ref 14–18)
IMM GRANULOCYTES NFR BLD AUTO: 0.4 % — HIGH (ref 0.1–0.3)
LYMPHOCYTES # BLD AUTO: 1.88 K/UL — SIGNIFICANT CHANGE UP (ref 1.2–3.4)
LYMPHOCYTES # BLD AUTO: 16.8 % — LOW (ref 20.5–51.1)
MCHC RBC-ENTMCNC: 31.7 PG — HIGH (ref 27–31)
MCHC RBC-ENTMCNC: 34.7 G/DL — SIGNIFICANT CHANGE UP (ref 32–37)
MCV RBC AUTO: 91.3 FL — SIGNIFICANT CHANGE UP (ref 80–94)
METHADONE UR-MCNC: NEGATIVE — SIGNIFICANT CHANGE UP
MONOCYTES # BLD AUTO: 0.73 K/UL — HIGH (ref 0.1–0.6)
MONOCYTES NFR BLD AUTO: 6.5 % — SIGNIFICANT CHANGE UP (ref 1.7–9.3)
NEUTROPHILS # BLD AUTO: 8.35 K/UL — HIGH (ref 1.4–6.5)
NEUTROPHILS NFR BLD AUTO: 74.5 % — SIGNIFICANT CHANGE UP (ref 42.2–75.2)
NRBC # BLD: 0 /100 WBCS — SIGNIFICANT CHANGE UP (ref 0–0)
OPIATES UR-MCNC: NEGATIVE — SIGNIFICANT CHANGE UP
PCP UR-MCNC: NEGATIVE — SIGNIFICANT CHANGE UP
PLATELET # BLD AUTO: 393 K/UL — SIGNIFICANT CHANGE UP (ref 130–400)
POTASSIUM SERPL-MCNC: 4.3 MMOL/L — SIGNIFICANT CHANGE UP (ref 3.5–5)
POTASSIUM SERPL-SCNC: 4.3 MMOL/L — SIGNIFICANT CHANGE UP (ref 3.5–5)
PROPOXYPHENE QUALITATIVE URINE RESULT: NEGATIVE — SIGNIFICANT CHANGE UP
PROT SERPL-MCNC: 7.7 G/DL — SIGNIFICANT CHANGE UP (ref 6–8)
RBC # BLD: 5.3 M/UL — SIGNIFICANT CHANGE UP (ref 4.7–6.1)
RBC # FLD: 11.9 % — SIGNIFICANT CHANGE UP (ref 11.5–14.5)
SALICYLATES SERPL-MCNC: <0.3 MG/DL — LOW (ref 4–30)
SARS-COV-2 RNA SPEC QL NAA+PROBE: SIGNIFICANT CHANGE UP
SODIUM SERPL-SCNC: 139 MMOL/L — SIGNIFICANT CHANGE UP (ref 135–146)
THC UR QL: POSITIVE
WBC # BLD: 11.21 K/UL — HIGH (ref 4.8–10.8)
WBC # FLD AUTO: 11.21 K/UL — HIGH (ref 4.8–10.8)

## 2021-11-03 PROCEDURE — 99285 EMERGENCY DEPT VISIT HI MDM: CPT

## 2021-11-03 PROCEDURE — 93010 ELECTROCARDIOGRAM REPORT: CPT
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
## 2021-11-03 NOTE — ED PROVIDER NOTE - OBJECTIVE STATEMENT
hx from patient and [facility]  29 yo M brought in by [facility] for agitation, not taking his psych meds and drinking. Patient admits to drinking alcohol. Denies drug use. No homicidal or suicidal ideations.

## 2021-11-03 NOTE — ED ADULT NURSE NOTE - NSFALLRSKASSESSDT_ED_ALL_ED
03-Nov-2021 16:48
T(C): 36.7 (07-20-21 @ 06:39), Max: 36.7 (07-20-21 @ 06:39)  HR: 90 (07-20-21 @ 06:39) (90 - 90)  BP: 104/53 (07-20-21 @ 06:39) (104/53 - 104/53)  RR: 16 (07-20-21 @ 06:39) (16 - 16)    EFM: 130 bpm, moderate variability, +accels  TOCO: none    Abd: soft, gravid, nontender, no incisional tenderness

## 2021-11-03 NOTE — ED PROVIDER NOTE - NSFOLLOWUPCLINICS_GEN_ALL_ED_FT
[facility]  OP Mental Health  [address] [place] [address]  Phone: [phone]  Fax:   Follow Up Time: 1-3 Days    [facility]  Family Medicine  [address] [place] [address]  Phone: [phone]  Fax:   Follow Up Time: 1-3 Days

## 2021-11-03 NOTE — ED PROVIDER NOTE - CLINICAL SUMMARY MEDICAL DECISION MAKING FREE TEXT BOX
patient has been observed in the ed he is not homicidal or suicidal he has been calm co-operative and tolerating po   the patient is able to ambulate well at this time is stable for discharge at this time
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
## 2021-11-03 NOTE — ED PROVIDER NOTE - NS ED ROS FT
Review of Systems    Constitutional: (-) fever, (-) chills  Eyes/ENT: (-) blurry vision, (-) epistaxis, (-) sore throat  Cardiovascular: (-) chest pain, (-) syncope  Respiratory: (-) cough, (-) shortness of breath  Gastrointestinal: (-) pain, (-) nausea, (-) vomiting, (-) diarrhea  Musculoskeletal: (-) neck pain, (-) back pain, (-) body aches  Integumentary: (-) rash, (-) edema  Neurological: (-) headache, (-) altered mental status  Psychiatric: (-) hallucinations, (+) agitation  Allergic/Immunologic: (-) pruritus

## 2021-11-03 NOTE — ED PROVIDER NOTE - PATIENT PORTAL LINK FT
You can access the FollowMyHealth Patient Portal offered by [facility] by registering at the following website: [email or web address]. By joining [facility]’s FollowMyHealth portal, you will also be able to view your health information using other applications (apps) compatible with our system.

## 2021-11-10 ENCOUNTER — EMERGENCY (EMERGENCY)
Facility: HOSPITAL | Age: 31
LOS: 0 days | Discharge: HOME | End: 2021-11-10
Attending: STUDENT IN AN ORGANIZED HEALTH CARE EDUCATION/TRAINING PROGRAM | Admitting: STUDENT IN AN ORGANIZED HEALTH CARE EDUCATION/TRAINING PROGRAM
Payer: MEDICAID

## 2021-11-10 VITALS — HEIGHT: 67 IN

## 2021-11-10 DIAGNOSIS — Z88.0 ALLERGY STATUS TO PENICILLIN: ICD-10-CM

## 2021-11-10 DIAGNOSIS — Z00.00 ENCOUNTER FOR GENERAL ADULT MEDICAL EXAMINATION WITHOUT ABNORMAL FINDINGS: ICD-10-CM

## 2021-11-10 DIAGNOSIS — F41.9 ANXIETY DISORDER, UNSPECIFIED: ICD-10-CM

## 2021-11-10 DIAGNOSIS — Z94.7 CORNEAL TRANSPLANT STATUS: Chronic | ICD-10-CM

## 2021-11-10 PROCEDURE — 99284 EMERGENCY DEPT VISIT MOD MDM: CPT
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
## 2021-11-10 NOTE — ED PROVIDER NOTE - NSFOLLOWUPINSTRUCTIONS_ED_ALL_ED_FT
Anxiety    Generalized anxiety disorder (ORIN) is a mental disorder. It is defined as anxiety that is not necessarily related to specific events or activities or is out of proportion to said events. Symptoms include restlessness, fatigue, difficulty concentrations, irritability and difficulty concentrating. It may interfere with life functions, including relationships, work, and school. If you were started on a medication, make sure to take exactly as prescribed and follow up with a psychiatrist.    SEEK IMMEDIATE MEDICAL CARE IF YOU HAVE ANY OF THE FOLLOWING SYMPTOMS: thoughts about hurting killing yourself, thoughts about hurting or killing somebody else, hallucinations, or worsening depression.

## 2021-11-10 NOTE — ED PROVIDER NOTE - PATIENT PORTAL LINK FT
You can access the FollowMyHealth Patient Portal offered by [facility] by registering at the following website: [email or web address]. By joining [facility]’s FollowMyHealth portal, you will also be able to view your health information using other applications (apps) compatible with our system.
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
## 2021-11-10 NOTE — DOWNTIME INTERRUPTION NOTE - TIME SYSTEM UNAVAILABLE
Medically the patient is cleared for surgery at this time.  There is no contraindication to local, regional, spinal, general or MAC anesthesia.  The patient is aware of the risks and benefits of surgery.  He will avoid aspirin and NSAIDs 7 days prior to surgery.  He was on steroids for most of last year, and should receive a steroid prep per anesthesia protocol.  He will stop his metformin the day before surgery.  He will reduce his Toujeo dose to 12 units in the morning of and 28 at bedtime the night before surgery.  He will hold his Humalog the morning of surgery.  He may continue his other medications pre-and postoperatively. He is not on beta blocker prophylaxis due to his COPD-asthma.  There is no contraindicaltion to DVT prophylaxis, if indicated.     10-Nov-2021 16:20

## 2021-11-13 LAB
CARBOXYTHC UR CFM-MCNC: 468 NG/ML — SIGNIFICANT CHANGE UP
CARBOXYTHC UR QL SCN: 158.4 MG/DL — SIGNIFICANT CHANGE UP
CARBOXYTHC UR QL SCN: 295 — SIGNIFICANT CHANGE UP
THC CREATININE URINE: 158.4 MG/DL — SIGNIFICANT CHANGE UP
THC METABOLITE/CREAT URINE: 295 — SIGNIFICANT CHANGE UP

## 2021-11-19 ENCOUNTER — INPATIENT (INPATIENT)
Facility: HOSPITAL | Age: 31
LOS: 10 days | Discharge: HOME | End: 2021-11-30
Attending: PSYCHIATRY & NEUROLOGY | Admitting: PSYCHIATRY & NEUROLOGY
Payer: MEDICAID

## 2021-11-19 VITALS
HEIGHT: 67 IN | OXYGEN SATURATION: 99 % | DIASTOLIC BLOOD PRESSURE: 81 MMHG | SYSTOLIC BLOOD PRESSURE: 126 MMHG | HEART RATE: 76 BPM | RESPIRATION RATE: 18 BRPM | TEMPERATURE: 97 F

## 2021-11-19 DIAGNOSIS — F12.920 CANNABIS USE, UNSPECIFIED WITH INTOXICATION, UNCOMPLICATED: ICD-10-CM

## 2021-11-19 DIAGNOSIS — F10.10 ALCOHOL ABUSE, UNCOMPLICATED: ICD-10-CM

## 2021-11-19 DIAGNOSIS — Z94.7 CORNEAL TRANSPLANT STATUS: Chronic | ICD-10-CM

## 2021-11-19 DIAGNOSIS — F41.9 ANXIETY DISORDER, UNSPECIFIED: ICD-10-CM

## 2021-11-19 DIAGNOSIS — F33.1 MAJOR DEPRESSIVE DISORDER, RECURRENT, MODERATE: ICD-10-CM

## 2021-11-19 LAB
ALBUMIN SERPL ELPH-MCNC: 4.9 G/DL — SIGNIFICANT CHANGE UP (ref 3.5–5.2)
ALP SERPL-CCNC: 87 U/L — SIGNIFICANT CHANGE UP (ref 30–115)
ALT FLD-CCNC: 18 U/L — SIGNIFICANT CHANGE UP (ref 0–41)
ANION GAP SERPL CALC-SCNC: 15 MMOL/L — HIGH (ref 7–14)
AST SERPL-CCNC: 25 U/L — SIGNIFICANT CHANGE UP (ref 0–41)
BASOPHILS # BLD AUTO: 0.08 K/UL — SIGNIFICANT CHANGE UP (ref 0–0.2)
BASOPHILS NFR BLD AUTO: 0.8 % — SIGNIFICANT CHANGE UP (ref 0–1)
BILIRUB SERPL-MCNC: 0.5 MG/DL — SIGNIFICANT CHANGE UP (ref 0.2–1.2)
BUN SERPL-MCNC: 15 MG/DL — SIGNIFICANT CHANGE UP (ref 10–20)
CALCIUM SERPL-MCNC: 9.9 MG/DL — SIGNIFICANT CHANGE UP (ref 8.5–10.1)
CHLORIDE SERPL-SCNC: 101 MMOL/L — SIGNIFICANT CHANGE UP (ref 98–110)
CO2 SERPL-SCNC: 25 MMOL/L — SIGNIFICANT CHANGE UP (ref 17–32)
CREAT SERPL-MCNC: 0.9 MG/DL — SIGNIFICANT CHANGE UP (ref 0.7–1.5)
EOSINOPHIL # BLD AUTO: 0.15 K/UL — SIGNIFICANT CHANGE UP (ref 0–0.7)
EOSINOPHIL NFR BLD AUTO: 1.5 % — SIGNIFICANT CHANGE UP (ref 0–8)
ETHANOL SERPL-MCNC: <10 MG/DL — SIGNIFICANT CHANGE UP
GLUCOSE SERPL-MCNC: 109 MG/DL — HIGH (ref 70–99)
HCT VFR BLD CALC: 49.8 % — SIGNIFICANT CHANGE UP (ref 42–52)
HGB BLD-MCNC: 16.9 G/DL — SIGNIFICANT CHANGE UP (ref 14–18)
IMM GRANULOCYTES NFR BLD AUTO: 0.3 % — SIGNIFICANT CHANGE UP (ref 0.1–0.3)
LYMPHOCYTES # BLD AUTO: 2.52 K/UL — SIGNIFICANT CHANGE UP (ref 1.2–3.4)
LYMPHOCYTES # BLD AUTO: 24.6 % — SIGNIFICANT CHANGE UP (ref 20.5–51.1)
MCHC RBC-ENTMCNC: 32.3 PG — HIGH (ref 27–31)
MCHC RBC-ENTMCNC: 33.9 G/DL — SIGNIFICANT CHANGE UP (ref 32–37)
MCV RBC AUTO: 95 FL — HIGH (ref 80–94)
MONOCYTES # BLD AUTO: 0.71 K/UL — HIGH (ref 0.1–0.6)
MONOCYTES NFR BLD AUTO: 6.9 % — SIGNIFICANT CHANGE UP (ref 1.7–9.3)
NEUTROPHILS # BLD AUTO: 6.76 K/UL — HIGH (ref 1.4–6.5)
NEUTROPHILS NFR BLD AUTO: 65.9 % — SIGNIFICANT CHANGE UP (ref 42.2–75.2)
NRBC # BLD: 0 /100 WBCS — SIGNIFICANT CHANGE UP (ref 0–0)
PLATELET # BLD AUTO: 361 K/UL — SIGNIFICANT CHANGE UP (ref 130–400)
POTASSIUM SERPL-MCNC: 3.8 MMOL/L — SIGNIFICANT CHANGE UP (ref 3.5–5)
POTASSIUM SERPL-SCNC: 3.8 MMOL/L — SIGNIFICANT CHANGE UP (ref 3.5–5)
PROT SERPL-MCNC: 7.6 G/DL — SIGNIFICANT CHANGE UP (ref 6–8)
RBC # BLD: 5.24 M/UL — SIGNIFICANT CHANGE UP (ref 4.7–6.1)
RBC # FLD: 12.3 % — SIGNIFICANT CHANGE UP (ref 11.5–14.5)
SODIUM SERPL-SCNC: 141 MMOL/L — SIGNIFICANT CHANGE UP (ref 135–146)
WBC # BLD: 10.25 K/UL — SIGNIFICANT CHANGE UP (ref 4.8–10.8)
WBC # FLD AUTO: 10.25 K/UL — SIGNIFICANT CHANGE UP (ref 4.8–10.8)

## 2021-11-19 PROCEDURE — 90792 PSYCH DIAG EVAL W/MED SRVCS: CPT

## 2021-11-19 PROCEDURE — 99285 EMERGENCY DEPT VISIT HI MDM: CPT

## 2021-11-19 RX ORDER — HALOPERIDOL DECANOATE 100 MG/ML
5 INJECTION INTRAMUSCULAR EVERY 6 HOURS
Refills: 0 | Status: DISCONTINUED | OUTPATIENT
Start: 2021-11-19 | End: 2021-11-20

## 2021-11-19 RX ORDER — HYDROXYZINE HCL 10 MG
50 TABLET ORAL EVERY 6 HOURS
Refills: 0 | Status: DISCONTINUED | OUTPATIENT
Start: 2021-11-19 | End: 2021-11-20

## 2021-11-19 RX ORDER — HYDROXYZINE HCL 10 MG
50 TABLET ORAL EVERY 6 HOURS
Refills: 0 | Status: DISCONTINUED | OUTPATIENT
Start: 2021-11-20 | End: 2021-11-30

## 2021-11-19 RX ORDER — HALOPERIDOL DECANOATE 100 MG/ML
5 INJECTION INTRAMUSCULAR EVERY 6 HOURS
Refills: 0 | Status: DISCONTINUED | OUTPATIENT
Start: 2021-11-20 | End: 2021-11-30

## 2021-11-19 RX ORDER — SERTRALINE 25 MG/1
50 TABLET, FILM COATED ORAL DAILY
Refills: 0 | Status: DISCONTINUED | OUTPATIENT
Start: 2021-11-19 | End: 2021-11-20

## 2021-11-19 RX ORDER — HYDROXYZINE HCL 10 MG
50 TABLET ORAL AT BEDTIME
Refills: 0 | Status: DISCONTINUED | OUTPATIENT
Start: 2021-11-19 | End: 2021-11-30

## 2021-11-19 RX ORDER — SERTRALINE 25 MG/1
50 TABLET, FILM COATED ORAL DAILY
Refills: 0 | Status: DISCONTINUED | OUTPATIENT
Start: 2021-11-20 | End: 2021-11-30
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
## 2021-11-19 NOTE — ED BEHAVIORAL HEALTH ASSESSMENT NOTE - FAMILY HISTORY OF PSYCHIATRIC ILLNESS / SUICIDALITY
Detail Level: Zone Discontinue Regimen: Aldara, patient completed total treatment. Patient finished a week ago Plan: Patient to f.u in 1 month for re evaluation of site. Unable to assess

## 2021-11-19 NOTE — ED BEHAVIORAL HEALTH ASSESSMENT NOTE - DETAILS
fatty liver diagnosis and treatment plan children live with their mothers respectively treatment plan impaired cognition bullied by peers n/a

## 2021-11-19 NOTE — ED PROVIDER NOTE - OBJECTIVE STATEMENT
Pt is a 31 yo M BIBA from for medical eval.. Pt states he is depressed and has anxiety . Denies any SI/HI.

## 2021-11-19 NOTE — ED PROVIDER NOTE - CLINICAL SUMMARY MEDICAL DECISION MAKING FREE TEXT BOX
History of cholecystectomy
ATTENDING NOTE:   29 y/o M with PMH of depression, anxiety, previous psychiatric admission, p/w anxiety and depression, worsening from before. Pt notes he is unable to do ADL and was kicked out of his house, currently undomiciled. No other medical complaints. Well appearing, NAD, non toxic. NCAT PERRLA EOMI neck supple non tender normal wob cta bl rrr abdomen s nt nd no rebound no guarding WWPx4 neuro non focal.   Admit to psychiatry for voluntary admission.

## 2021-11-19 NOTE — ED ADULT TRIAGE NOTE - CHIEF COMPLAINT QUOTE
BIBA from for medical eval. Aunt called 911 stating pt has ADHD and does not take his medications and wants him evaluated. Pt states he is sad and has anxiety in triage. Denies any SI/HI.

## 2021-11-19 NOTE — ED BEHAVIORAL HEALTH ASSESSMENT NOTE - HYGIENE
Problem: Pain  Goal: #Acceptable pain level achieved/maintained at rest using NRS/Faces  Description: This goal is used for patients who can self-report.  Acceptable means the level is at or below the identified comfort/function goal.  Outcome: Outcome Met, Continue evaluating goal progress toward completion  Flowsheets (Taken 5/28/2021 3010)  Patient's Stated Pain Goal: No pain  Pain Evaluation: Pain level/behaviors acceptable - continue plan of care  Goal: # Acceptable pain level achieved/maintained with activity using NRS/Faces  Description: This goal is used for patients who can self-report and are not achieving acceptable pain control during activity.  Outcome: Outcome Met, Continue evaluating goal progress toward completion     Problem: Non-Pressure Injury Wound  Goal: # No deterioration in wound  Outcome: Outcome Met, Continue evaluating goal progress toward completion  Goal: # Verbalizes understanding of wound and wound care  Description: If abnormality is a skin tear, avoid using tape on skin including transparent dressings. Document education using the patient education activity.  Outcome: Outcome Met, Continue evaluating goal progress toward completion  Goal: Participates in wound care activities  Outcome: Outcome Met, Continue evaluating goal progress toward completion     Problem: Breathing Pattern Ineffective  Goal: Air exchange is effective, demonstrated by Sp02 sat of greater then or = 92% (or as ordered)  Outcome: Outcome Met, Continue evaluating goal progress toward completion  Goal: Respiratory pattern is quiet and regular without report of SOB  Outcome: Outcome Met, Continue evaluating goal progress toward completion     Problem: Pneumonia  Goal: Verbalizes understanding of pneumonia, treatment, and ongoing prevention  Description: Document on Patient Education Activity  Outcome: Outcome Met, Continue evaluating goal progress toward completion     Problem: At Risk for Falls  Goal: # Patient  does not fall  Outcome: Outcome Met, Continue evaluating goal progress toward completion  Goal: # Takes action to control fall-related risks  Outcome: Outcome Met, Continue evaluating goal progress toward completion  Goal: # Verbalizes understanding of fall risk/precautions  Description: Document education using the patient education activity  Outcome: Outcome Met, Continue evaluating goal progress toward completion      Poor
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
## 2021-11-19 NOTE — ED PROVIDER NOTE - NS_ ATTENDINGSCRIBEDETAILS _ED_A_ED_FT
I have personally evaluated this patient. I have seen this patient with a medical scribe, please see [facility] for my contribution to care. I have reviewed scribe notes which are accurate.

## 2021-11-19 NOTE — ED PROVIDER NOTE - PHYSICAL EXAMINATION
CONSTITUTIONAL: Well-developed; well-nourished; in no acute distress.   SKIN: warm, dry  HEAD: Normocephalic; atraumatic.  EYES: PERRL, EOMI, normal sclera and conjunctiva   ENT: No nasal discharge; airway clear.  NECK: Supple; non tender.  CARD:  Regular rate and rhythm.   RESP: NO inc WOB , speaking in full sentences, lungs CTAB  ABD: soft ntnd  EXT: Normal ROM.  no LE edema no unilateral leg swelling, no lacerations.   NEURO: Alert, oriented, grossly unremarkable  PSYCH: Cooperative, appropriate.

## 2021-11-19 NOTE — ED ADULT NURSE NOTE - OBJECTIVE STATEMENT
Patient reports he is depressed but not suicidal, has been cutting himself at home. Per mom patient has not been taking his adhd medications. Patient reports he is depressed but not suicidal. Believes he does not need to take his phych medications anymore because they are not good for him.

## 2021-11-20 LAB
A1C WITH ESTIMATED AVERAGE GLUCOSE RESULT: 5.5 % — SIGNIFICANT CHANGE UP (ref 4–5.6)
ALBUMIN SERPL ELPH-MCNC: 4.7 G/DL — SIGNIFICANT CHANGE UP (ref 3.5–5.2)
ALP SERPL-CCNC: 80 U/L — SIGNIFICANT CHANGE UP (ref 30–115)
ALT FLD-CCNC: 18 U/L — SIGNIFICANT CHANGE UP (ref 0–41)
ANION GAP SERPL CALC-SCNC: 12 MMOL/L — SIGNIFICANT CHANGE UP (ref 7–14)
APAP SERPL-MCNC: <5 UG/ML — LOW (ref 10–30)
AST SERPL-CCNC: 20 U/L — SIGNIFICANT CHANGE UP (ref 0–41)
BILIRUB SERPL-MCNC: 0.4 MG/DL — SIGNIFICANT CHANGE UP (ref 0.2–1.2)
BUN SERPL-MCNC: 14 MG/DL — SIGNIFICANT CHANGE UP (ref 10–20)
CALCIUM SERPL-MCNC: 9.3 MG/DL — SIGNIFICANT CHANGE UP (ref 8.5–10.1)
CHLORIDE SERPL-SCNC: 101 MMOL/L — SIGNIFICANT CHANGE UP (ref 98–110)
CO2 SERPL-SCNC: 29 MMOL/L — SIGNIFICANT CHANGE UP (ref 17–32)
COVID-19 SPIKE DOMAIN AB INTERP: POSITIVE
COVID-19 SPIKE DOMAIN ANTIBODY RESULT: 13.2 U/ML — HIGH
CREAT SERPL-MCNC: 0.8 MG/DL — SIGNIFICANT CHANGE UP (ref 0.7–1.5)
ESTIMATED AVERAGE GLUCOSE: 111 MG/DL — SIGNIFICANT CHANGE UP (ref 68–114)
GLUCOSE SERPL-MCNC: 73 MG/DL — SIGNIFICANT CHANGE UP (ref 70–99)
MAGNESIUM SERPL-MCNC: 2.1 MG/DL — SIGNIFICANT CHANGE UP (ref 1.8–2.4)
POTASSIUM SERPL-MCNC: 4.9 MMOL/L — SIGNIFICANT CHANGE UP (ref 3.5–5)
POTASSIUM SERPL-SCNC: 4.9 MMOL/L — SIGNIFICANT CHANGE UP (ref 3.5–5)
PROT SERPL-MCNC: 6.9 G/DL — SIGNIFICANT CHANGE UP (ref 6–8)
SALICYLATES SERPL-MCNC: <0.3 MG/DL — LOW (ref 4–30)
SARS-COV-2 IGG+IGM SERPL QL IA: 13.2 U/ML — HIGH
SARS-COV-2 IGG+IGM SERPL QL IA: POSITIVE
SARS-COV-2 RNA SPEC QL NAA+PROBE: SIGNIFICANT CHANGE UP
SODIUM SERPL-SCNC: 142 MMOL/L — SIGNIFICANT CHANGE UP (ref 135–146)
TSH SERPL-MCNC: 1.27 UIU/ML — SIGNIFICANT CHANGE UP (ref 0.27–4.2)

## 2021-11-20 PROCEDURE — 99232 SBSQ HOSP IP/OBS MODERATE 35: CPT

## 2021-11-20 RX ORDER — PREDNISOLONE SODIUM PHOSPHATE 1 %
1 DROPS OPHTHALMIC (EYE) DAILY
Refills: 0 | Status: DISCONTINUED | OUTPATIENT
Start: 2021-11-20 | End: 2021-11-30

## 2021-11-20 RX ORDER — NICOTINE POLACRILEX 2 MG
1 GUM BUCCAL DAILY
Refills: 0 | Status: DISCONTINUED | OUTPATIENT
Start: 2021-11-20 | End: 2021-11-21

## 2021-11-20 RX ADMIN — Medication 1 PATCH: at 08:46

## 2021-11-20 RX ADMIN — Medication 1 PATCH: at 20:01

## 2021-11-20 RX ADMIN — Medication 1 DROP(S): at 16:15

## 2021-11-20 RX ADMIN — SERTRALINE 50 MILLIGRAM(S): 25 TABLET, FILM COATED ORAL at 08:38

## 2021-11-20 RX ADMIN — Medication 50 MILLIGRAM(S): at 21:11

## 2021-11-20 NOTE — PROGRESS NOTE BEHAVIORAL HEALTH - NSBHCHARTREVIEWLAB_PSY_A_CORE FT
16.9   10.25 )-----------( 361      ( 19 Nov 2021 22:50 )             49.8     11-20    142  |  101  |  14  ----------------------------<  73  4.9   |  29  |  0.8    Ca    9.3      20 Nov 2021 09:00  Mg     2.1     11-20    TPro  6.9  /  Alb  4.7  /  TBili  0.4  /  DBili  x   /  AST  20  /  ALT  18  /  AlkPhos  80  11-20
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
## 2021-11-20 NOTE — H&P ADULT - NSHPPHYSICALEXAM_GEN_ALL_CORE
Vital Signs Last 24 Hrs  T(C): 35.8 (20 Nov 2021 03:36), Max: 36.2 (19 Nov 2021 20:33)  T(F): 96.4 (20 Nov 2021 03:36), Max: 97.1 (19 Nov 2021 20:33)  HR: 77 (20 Nov 2021 03:36) (75 - 77)  BP: 146/94 (20 Nov 2021 03:36) (126/81 - 146/94)  BP(mean): --  RR: 18 (20 Nov 2021 03:36) (18 - 18)  SpO2: 99% (20 Nov 2021 01:07) (99% - 99%)      GENERAL: NAD, lying in bed comfortably  HEAD:  Atraumatic, Normocephalic  EYES: EOMI, conjunctiva and sclera clear  ENT: Moist mucous membranes  NECK: Supple, No JVD  CHEST/LUNG: CTA b/l, No rales, rhonchi, wheezing, or rubs. Unlabored respirations  HEART: S1S2, No murmurs, rubs, or gallops  ABDOMEN: Bowel sounds present; Soft, Nontender, Nondistended.   EXTREMITIES:  No clubbing, cyanosis, or edema  NERVOUS SYSTEM:  Alert & Oriented X3, speech clear. No deficits   MSK: FROM all 4 extremities, full and equal strength  SKIN: No rashes or lesions

## 2021-11-20 NOTE — PROGRESS NOTE BEHAVIORAL HEALTH - NSBHFUPINTERVALHXFT_PSY_A_CORE
Chart reviewed, patient seen and evaluated at bedside this AM. As per nursing, no acute events overnight. Pt reports feeling better with improved mood and less anxiety, reports good night sleep and appetite, is worrying that he did not wake up for breakfast and it is cold by now. He denies SI/HI/Ah/Vh and does not appear to be psychotic.

## 2021-11-20 NOTE — H&P ADULT - ASSESSMENT
31 y/o M with PMH of depression, anxiety, previous psychiatric admission, p/w anxiety and depression, worsening admitted for psych eval.     # Anxiety  # Depression  - will follow recommendations as per psychiatrist    # Smoking cessation  - nicotine patch    # Regular diet
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
## 2021-11-20 NOTE — PROGRESS NOTE BEHAVIORAL HEALTH - NSBHCHARTREVIEWVS_PSY_A_CORE FT
Vital Signs Last 24 Hrs  T(C): 35.4 (20 Nov 2021 10:26), Max: 36.2 (19 Nov 2021 20:33)  T(F): 95.7 (20 Nov 2021 10:26), Max: 97.1 (19 Nov 2021 20:33)  HR: 75 (20 Nov 2021 10:26) (75 - 77)  BP: 134/93 (20 Nov 2021 10:26) (126/81 - 146/94)  BP(mean): --  RR: 18 (20 Nov 2021 10:26) (18 - 18)  SpO2: 99% (20 Nov 2021 01:07) (99% - 99%)

## 2021-11-20 NOTE — ED ADULT NURSE REASSESSMENT NOTE - NS ED NURSE REASSESS COMMENT FT1
pt being transported to [facility]. report given already to nurse at [facility]. pt stable. pt is calm and cooperative. safety and monitoring maintained.

## 2021-11-20 NOTE — ED ADULT NURSE REASSESSMENT NOTE - NS ED NURSE REASSESS COMMENT FT1
pt received from previous nurse at 23:00. pt on 1:1 for depression and suicidal. pt has bed at [facility]. awaiting transport. pt calm and cooperative. no s.s of acute distress. safety and 1:1 cont. v/s stable. will cont to monitor.

## 2021-11-20 NOTE — H&P ADULT - NSHPLABSRESULTS_GEN_ALL_CORE
LABS:  cret                        16.9   10.25 )-----------( 361      ( 19 Nov 2021 22:50 )             49.8     11-19    141  |  101  |  15  ----------------------------<  109<H>  3.8   |  25  |  0.9    Ca    9.9      19 Nov 2021 22:50    TPro  7.6  /  Alb  4.9  /  TBili  0.5  /  DBili  x   /  AST  25  /  ALT  18  /  AlkPhos  87  11-19

## 2021-11-20 NOTE — H&P ADULT - NSICDXFAMILYHX_GEN_ALL_CORE_FT
FAMILY HISTORY:  Father  Still living? Yes, Estimated age: Age Unknown  Family history of anxiety disorder, Age at diagnosis: Age Unknown    Mother  Still living? Yes, Estimated age: Age Unknown  FH: attention deficit disorder, Age at diagnosis: Age Unknown
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
## 2021-11-21 LAB
CHOLEST SERPL-MCNC: 138 MG/DL — SIGNIFICANT CHANGE UP
HDLC SERPL-MCNC: 56 MG/DL — SIGNIFICANT CHANGE UP
LIPID PNL WITH DIRECT LDL SERPL: 66 MG/DL — SIGNIFICANT CHANGE UP
NON HDL CHOLESTEROL: 82 MG/DL — SIGNIFICANT CHANGE UP
TRIGL SERPL-MCNC: 167 MG/DL — HIGH

## 2021-11-21 RX ORDER — NICOTINE POLACRILEX 2 MG
2 GUM BUCCAL
Refills: 0 | Status: DISCONTINUED | OUTPATIENT
Start: 2021-11-21 | End: 2021-11-30

## 2021-11-21 RX ADMIN — Medication 1 DROP(S): at 08:09

## 2021-11-21 RX ADMIN — Medication 50 MILLIGRAM(S): at 22:35

## 2021-11-21 RX ADMIN — SERTRALINE 50 MILLIGRAM(S): 25 TABLET, FILM COATED ORAL at 08:10

## 2021-11-21 RX ADMIN — Medication 2 MILLIGRAM(S): at 08:46

## 2021-11-21 RX ADMIN — Medication 1 PATCH: at 07:24

## 2021-11-21 RX ADMIN — Medication 1 PATCH: at 08:08
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
## 2021-11-21 NOTE — PROGRESS NOTE BEHAVIORAL HEALTH - NSBHCHARTREVIEWVS_PSY_A_CORE FT
Vital Signs Last 24 Hrs  T(C): 36.4 (21 Nov 2021 15:48), Max: 36.4 (21 Nov 2021 09:54)  T(F): 97.6 (21 Nov 2021 15:48), Max: 97.6 (21 Nov 2021 15:48)  HR: 62 (21 Nov 2021 15:48) (62 - 79)  BP: 13/73 (21 Nov 2021 15:48) (13/73 - 134/89)  BP(mean): --  RR: 17 (21 Nov 2021 15:48) (16 - 18)  SpO2: --

## 2021-11-21 NOTE — PROGRESS NOTE BEHAVIORAL HEALTH - NSBHFUPINTERVALHXFT_PSY_A_CORE
Chart reviewed, patient seen and evaluated at bed side . As per nursing, no acute events overnight. Pt reports feeling sad  , missing his sons   and less anxiety, reports good night sleep and appetite, I he is compliant with  medications  . He denies SI/HI/Ah/Vh and does not appear to be psychotic.

## 2021-11-22 PROCEDURE — 99232 SBSQ HOSP IP/OBS MODERATE 35: CPT | Mod: GC

## 2021-11-22 RX ADMIN — SERTRALINE 50 MILLIGRAM(S): 25 TABLET, FILM COATED ORAL at 08:06

## 2021-11-22 RX ADMIN — Medication 50 MILLIGRAM(S): at 21:10

## 2021-11-22 RX ADMIN — Medication 1 DROP(S): at 08:06
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
## 2021-11-22 NOTE — CHART NOTE - NSCHARTNOTEFT_GEN_A_CORE
Social Work Admit Note:    Patient is a 30 years of age male who was admitted for evaluation of depression.  At the time of assessment in the emergency department, patient informed that for the past few days he has been increasingly depressed with passive suicidal ideations.  He was tearful throughout interview and explained that his life has been bad.  He reported that he has trouble finding employment, he is not allowed to see his children, and he has constant conflict with his mother.  Patient requested IPP admission for safety and stabilization.      In the community, patient is currently undomiciled.  he had previously been staying at his fathers house.  Patient is single marital status and has 2 children (11 years old and a ) who live with their mothers.   He is unemployed.  He has a past psychiatric history of depression and anxiety.  He also has a history of alcohol, benzodiazepine, and cannabis abuse.  Patient has multiple prior IPP admissions, most recently at [facility] in 2021.  Patient did not follow up with outpatient treatment after discharge.        Sexual History – Patient identifies as heterosexual      Family relationships and history – Patients mother is his primary social support.    Leisure Activity Assessment – Unable to assess    Community Supports – None known  Employment – Patient is unemployed.    Substance Use Assessment – Patient endorses marijuana use   History of suicidality or self- injurious behaviors – No  Significant Loses – None known      Life Goals – Patient verbalized goal of improved mental health.      will continue to meet with patient 1:1 and with treatment team daily.  Discharge plan is for continued mental health treatment in outpatient setting.       Please refer to Social Work Psychosocial for additional information. Social Work Admit Note:    Patient is a 30 years of age male who was admitted for evaluation of depression.  At the time of assessment in the emergency department, patient informed that for the past few days he has been increasingly depressed with passive suicidal ideations.  He was tearful throughout interview and explained that his life has been bad.  He reported that he has trouble finding employment, he is not allowed to see his children, and he has constant conflict with his mother.  Patient requested IPP admission for safety and stabilization.      In the community, patient is currently undomiciled.  He had previously been staying at his fathers house.  Patient is single marital status and has 2 children (11 years old and a ) who live with their mothers.   He is unemployed.  He has a past psychiatric history of depression and anxiety.  He also has a history of alcohol, benzodiazepine, and cannabis abuse.  Patient has multiple prior IPP admissions, most recently at [facility] in 2021.  Patient did not follow up with outpatient treatment after discharge.        Sexual History – Patient identifies as heterosexual      Family relationships and history – Patients mother is his primary social support.    Leisure Activity Assessment – Unable to assess    Community Supports – None known  Employment – Patient is unemployed.    Substance Use Assessment – Patient endorses marijuana use   History of suicidality or self- injurious behaviors – No  Significant Loses – None known      Life Goals – Patient verbalized goal of improved mental health.      will continue to meet with patient 1:1 and with treatment team daily.  Discharge plan is for continued mental health treatment in outpatient setting.       Please refer to Social Work Psychosocial for additional information.
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
## 2021-11-22 NOTE — PROGRESS NOTE BEHAVIORAL HEALTH - NSBHCHARTREVIEWINVESTIGATE_PSY_A_CORE FT
< from: 12 Lead ECG (11.03.21 @ 12:03) >    QTC Calculation(Bazett) 432 ms    < end of copied text >

## 2021-11-22 NOTE — DISCHARGE NOTE BEHAVIORAL HEALTH - NSBHDCRESPONSEFT_PSY_A_CORE
Pt has made significant progress over the course of hospitalization. With continuous psychotherapy from the treatment team and the medications, patient reports feeling better, sleeping and eating well, was no longer irritable, was found to be no longer paranoid. Thought process became organized and insight improved. Pt was calm and cooperative and was in good behavioral control. Patient denied any suicidal or homicidal ideations. Patient denied any auditory or visual hallucinations. Pt was evaluated by treatment team, pt is stable for discharge and patient shows no imminent danger to self, others or property at this time. Pt understands and agrees with treatment plan and following up with outpatient. Psychoeducation provided regarding diagnosis, medications, treatment and follow up. Risks, benefits, alternatives discussed, all questions and concerns addressed and answered. Pt has made significant progress over the course of hospitalization. With continuous psychotherapy from the treatment team and the medications, patient reports feeling better, sleeping and eating well, was no longer irritable, was found to be no longer  acutely psychotic. Thought process became organized and insight improved. Pt was calm and cooperative and was in good behavioral control. Patient denied any suicidal or homicidal ideations. Patient denied any auditory or visual hallucinations. Pt was evaluated by treatment team, pt is stable for discharge and patient shows no imminent danger to self, others or property at this time. Pt understands and agrees with treatment plan and following up with outpatient. Psychoeducation provided regarding diagnosis, medications, treatment and follow up. Risks, benefits, alternatives discussed, all questions and concerns addressed and answered.

## 2021-11-22 NOTE — DISCHARGE NOTE BEHAVIORAL HEALTH - MEDICATION SUMMARY - MEDICATIONS TO STOP TAKING
I will STOP taking the medications listed below when I get home from the hospital:    risperiDONE 1 mg oral tablet  -- 1 tab(s) by mouth once a day (at bedtime) x 14 days    hydrOXYzine hydrochloride 25 mg oral tablet  -- 1 tab(s) by mouth once a day x 7 days, As Needed -anxiety/ insomnia - for anxiety    prednisoLONE acetate 1% ophthalmic suspension  -- 1 drop(s) to each affected eye once a day until stopped by MD    risperiDONE 1 mg oral tablet  -- 1 tab(s) by mouth once a day x 14 days, As Needed -for anxiety   -- It is very important that you take or use this exactly as directed.  Do not skip doses or discontinue unless directed by your doctor.  May cause drowsiness.  Alcohol may intensify this effect.  Use care when operating dangerous machinery.  Obtain medical advice before taking any non-prescription drugs as some may affect the action of this medication.    hydrOXYzine hydrochloride 25 mg oral tablet  -- 1 tab(s) by mouth once a day (at bedtime) x 14 days, As Needed -for anxiety   -- May cause drowsiness.  Alcohol may intensify this effect.  Use care when operating dangerous machinery.  Obtain medical advice before taking any non-prescription drugs as some may affect the action of this medication.

## 2021-11-22 NOTE — DISCHARGE NOTE BEHAVIORAL HEALTH - MEDICATION SUMMARY - MEDICATIONS TO TAKE
I will START or STAY ON the medications listed below when I get home from the hospital:    sertraline 50 mg oral tablet  -- 1 tab(s) by mouth once a day x 14 days   -- Indication: For Major depressive disorder    QUEtiapine 100 mg oral tablet  -- 1 tab(s) by mouth once a day (at bedtime) x 14 days   -- Indication: For psychosis/delusions    prednisoLONE acetate 1% ophthalmic suspension  -- 1 drop(s) to each affected eye once a day until stopped by MD  -- Indication: For dry eye

## 2021-11-22 NOTE — PROGRESS NOTE BEHAVIORAL HEALTH - NSBHCHARTREVIEWLAB_PSY_A_CORE FT
THC, Urine Qualitative (10.12.21 @ 15:27)    THC, Urine Qualitative: Positive    Benzodiazepine, Urine (10.12.21 @ 15:27)    Benzodiazepine, Urine: Positive      CAPILLARY BLOOD GLUCOSE

## 2021-11-22 NOTE — PROGRESS NOTE BEHAVIORAL HEALTH - NSBHCHARTREVIEWVS_PSY_A_CORE FT
T(F): 97.4 (11-22-21 @ 09:45), Max: 97.6 (11-21-21 @ 15:48)  HR: 70 (11-22-21 @ 09:45) (62 - 70)  BP: 126/77 (11-22-21 @ 09:45) (13/73 - 126/77)  BP(mean): --  ABP: --  ABP(mean): --  RR: 16 (11-22-21 @ 09:45) (16 - 17)  SpO2: --

## 2021-11-22 NOTE — DISCHARGE NOTE BEHAVIORAL HEALTH - NSBHDCSWCOMMENTSFT_PSY_A_CORE
D/C summary faxed to [facility] [phone] D/C summary faxed to [facility] [phone] on 11/30 at 1:15pm

## 2021-11-22 NOTE — PROGRESS NOTE BEHAVIORAL HEALTH - SUMMARY
Pt is a 31yo single domiciled male with hx of depression and anxiety and alcohol, benzo, and cannabis abuse, who had frequent ED visits in November 2021. who came to ED today complaining of having depressed mood and wanted to be admitted.     He said he had frequent visits because his mother calling EMS when he drinks or smokes at home. The chart information reveals that pt became verbally violent, e.g. yelling at his mother, neighbours calling 911 at times.  Pt's last IPP at [facility] was in October 2021. He said he did not follow up outpatient service because his vision is bad. He became tearful, stating that his life has been bad. He has children but their mothers would not allow him to see them. He cannot find jobs because his vision is poor. 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 His mother often yells at him. Records also states that he was kicked out of shelter due to his violent behaviors. Pt had hx of multiple IPPs for depression and anxiety.    Pt appears dishevled, depressed, endorsing passive suicidal thought. He denies having manic episode before, or having delusions or auditory hallucinations. His thought is linear and goal directed. He said last time he drank and drunk was two days ago. He denies having withdrawal symptoms. He said he has fatty liver. He said he has depression and anxiety, and wants to resume antidepressant. He does not want to take antipsychotic medication, such as risperidone "it makes me zoning out". Pt denies using heroin or stimulants.    Plan:   #MDD   -sertraline 50 mg Oral daily for MDD   -hydroxyzine hydrochloride 50 mg Oral at bedtime for insomnia   -prednisolone 1% Suspension 1 Drop(s) Both EYES daily for dry eyes     #PRN medications  -artificial tears (preservative free) Ophthalmic Solution 1 Drop(s) Both EYES every 8 hours PRN Dry Eyes  -haloperidol 5 mg q6h PRN agitation  -hydroxyzine hydrochloride 50 mg q6h PRN Anxiety  -lorazepam 2 mg q6h PRN anxiety and withdrawal  -nicotine Polacrilex Gum 2 mg q3h PRN craving Pt is a 29yo single domiciled male with hx of depression and anxiety and alcohol, benzo, and cannabis abuse, who had frequent ED visits in November 2021. who came to ED today complaining of having depressed mood and wanted to be admitted.     He said he had frequent visits because his mother calling EMS when he drinks or smokes at home. The chart information reveals that pt became verbally violent, e.g. yelling at his mother, neighbours calling 911 at times.  Pt's last IPP at [facility] was in October 2021. He said he did not follow up outpatient service because his vision is bad. He became tearful, stating that his life has been bad. He has children but their mothers would not allow him to see them. He cannot find jobs because his vision is poor. 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 His mother often yells at him. Records also states that he was kicked out of shelter due to his violent behaviors. Pt had hx of multiple IPPs for depression and anxiety.    Pt appears disheveled, depressed, endorsing passive suicidal thought. He denies having manic episode before, or having delusions or auditory hallucinations. His thought is linear and goal directed. He said last time he drank and drunk was two days ago. He denies having withdrawal symptoms. He said he has fatty liver. He said he has depression and anxiety, and wants to resume antidepressant. He does not want to take antipsychotic medication, such as risperidone "it makes me zoning out". Pt denies using heroin or stimulants.    Plan:   #Admission Labs & paperwork   - [x] CBC, CMP, TSH, lipid panel, a1c ordered (for AM day after admission)   - [x] C-SSRS, CGI, AIMS completed  - [x] COVID swab routine (ordered Thursday this week)     #MDD   -sertraline 50 mg Oral daily for MDD   -hydroxyzine hydrochloride 50 mg qhs insomnia   -prednisolone 1% Suspension 1 Drop(s) Both EYES daily for dry eyes     #PRN medications  -artificial tears (preservative free) Ophthalmic Solution 1 Drop(s) Both EYES every 8 hours PRN Dry Eyes  -haloperidol 5 mg q6h PRN agitation  -hydroxyzine hydrochloride 50 mg q6h PRN Anxiety  -lorazepam 2 mg q6h PRN anxiety and withdrawal  -nicotine Polacrilex Gum 2 mg q3h PRN craving

## 2021-11-22 NOTE — DISCHARGE NOTE BEHAVIORAL HEALTH - NSBHDCVIOLFCTRMIT_PSY_A_CORE
patient will return to father's house, will be receiving social security shortly, stressed importance of compliance with medication

## 2021-11-22 NOTE — DISCHARGE NOTE BEHAVIORAL HEALTH - HPI (INCLUDE ILLNESS QUALITY, SEVERITY, DURATION, TIMING, CONTEXT, MODIFYING FACTORS, ASSOCIATED SIGNS AND SYMPTOMS)
"I am sad"  Pt is a 31yo single domiciled male with hx of depression and anxiety and alcohol, benzo, and cannabis abuse, who had frequent ED visits in November 2021. who came to ED today complaining of having depressed mood and wanted to be admitted.     He said he had frequent visits because his mother calling EMS when he drinks or smokes at home. The chart information reveals that pt became verbally violent, e.g. yelling at his mother, neighbours calling 911 at times.  Pt's last IPP at [facility] was in October 2021. He said he did not follow up outpatient service because his vision is bad. He became tearful, stating that his life has been bad. He has children but their mothers would not allow him to see them. He cannot find jobs because his vision is poor. 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 His mother often yells at him. Records also states that he was kicked out of shelter due to his violent behaviors. Pt had hx of multiple IPPs for depression and anxiety.    Pt appears dishevled, depressed, endorsing passive suicidal thought. He denies having manic episode before, or having delusions or auditory hallucinations. His thought is linear and goal directed. He said last time he drank and drunk was two days ago. He denies having withdrawal symptoms. He said he has fatty liver. He said he has depression and anxiety, and wants to resume antidepressant. He does not want to take antipsychotic medication, such as risperidone "it makes me zoning out". Pt denies using heroin or stimulants.

## 2021-11-22 NOTE — DISCHARGE NOTE BEHAVIORAL HEALTH - NSBHDCTHERAPYFT_PSY_A_CORE
Patient was engaged in group, milieu, and supportive therapies while on the unit. Treatment team was conducted weekly with the participating of nurse, , and providers. Family was encouraged to speak on the phone to the patient to help with safety planning

## 2021-11-22 NOTE — PROGRESS NOTE BEHAVIORAL HEALTH - NSBHFUPINTERVALHXFT_PSY_A_CORE
Patient seen and evaluated Patient seen and evaluated during teams meeting. Patient was pleasant, and cooperative during interview. 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 Patient states that he was rapping outside, and the Patient seen and evaluated during teams meeting. Patient was pleasant, and cooperative during interview. 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 Patient states that he was rapping outside, and that "girls keep breaking up with their husbands" in pursuit of him because of his music. Patient added, "I didn't think I was that good." Patient reports that his mother and father are misinterpreting his rapping as talking to himself, stating that they "think wrong" about it. Patient reports that he is homeless now; states that he was previously living with his father, but reports that his father's landlord led to his father placing a restraining order against him. Patient reports that his mother doesn't want him to come into her house. Patient reports that he wants to say in IPP until his disability check arrives, which reportedly comes in at the beginning of the month. Patient reports that he would like to live on his own with the social security money that he receives, reportedly over 800 dollars a month. Patient states that he is considering potentially living [facility] or in [place] where cost of living is lower.     Patient reports that he stopped taking his medications (possibly risperdal), because he reports that his prior medications weren't making much difference for him. Patient reports that the current medications that he has been given since this hospitalization (zoloft) has been helping significantly for his mood. Patient reports that he feels "depressed" a lot. Patient endorses Patient reports sleeplessness (states that he has had sleep issues for months), decreased interest in activities (e.g. rapping), some feelings of guilt (primarily guilt about accidentally wooing women), reports decreased energy, reports decreased concentration , decreased appetite, presents with some psychomotor retardation, denies current or past suicidality on interview. Patient denies symptoms of reyna, anxiety, PTSD at this time. Patient reports some marijuana use, unclear when last used.     Called patient's father, _________________________, Patient seen and evaluated during teams meeting. Patient was pleasant, and cooperative during interview. 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 Patient states that he was rapping outside, and that "girls keep breaking up with their husbands" in pursuit of him because of his music. Patient added, "I didn't think I was that good." Patient reports that his mother and father are misinterpreting his rapping as talking to himself, stating that they "think wrong" about it. Patient reports that he is homeless now; states that he was previously living with his father, but reports that his father's landlord led to his father placing a restraining order against him. Patient reports that his mother doesn't want him to come into her house. Patient reports that he wants to say in IPP until his disability check arrives, which reportedly comes in at the beginning of the month. Patient reports that he would like to live on his own with the social security money that he receives, reportedly over 800 dollars a month. Patient states that he is considering potentially living [facility] or in [place] where cost of living is lower.     Patient reports that he stopped taking his medications (possibly risperdal), because he reports that his prior medications weren't making much difference for him. Patient reports that the current medications that he has been given since this hospitalization (zoloft) has been helping significantly for his mood. Patient reports that he feels "depressed" a lot. Patient endorses Patient reports sleeplessness (states that he has had sleep issues for months), decreased interest in activities (e.g. rapping), some feelings of guilt (primarily guilt about accidentally wooing women), reports decreased energy, reports decreased concentration , decreased appetite, presents with some psychomotor retardation, denies current or past suicidality on interview. Patient denies symptoms of reyna, anxiety, PTSD at this time. Patient reports some marijuana use, unclear when last used.     Collateral: patient refused permission to speak with patient's father on admission interview today. Patient seen and evaluated during teams meeting. Patient was pleasant, and cooperative during interview. 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 Patient states that he was rapping outside, and that "girls keep breaking up with their husbands" in pursuit of him because of his music. Patient added, "I didn't think I was that good." Patient reports that his mother and father are misinterpreting his rapping as talking to himself, stating that they "think wrong" about it. Patient reports that he is homeless now; states that he was previously living with his father, but reports that his father's landlord led to his father placing a restraining order against him. Patient reports that his mother doesn't want him to come into her house. Patient reports that he wants to say in IPP until his disability check arrives, which reportedly comes in at the beginning of the month. Patient reports that he would like to live on his own with the social security money that he receives, reportedly over 800 dollars a month. Patient states that he is considering potentially living [facility] or in [place] where cost of living is lower.     Patient reports that he stopped taking his medications (possibly risperdal), because he reports that his prior medications weren't making much difference for him. Patient reports that the current medications that he has been given since this hospitalization (zoloft) has been helping significantly for his mood. Patient reports that he feels "depressed" a lot. Patient endorses Patient reports sleeplessness (states that he has had sleep issues for months), decreased interest in activities (e.g. rapping), some feelings of guilt (primarily guilt about inadvertently attracting women with his raps in public), reports decreased energy, reports decreased concentration , decreased appetite, presents with some psychomotor retardation, denies current or past suicidality on interview. Patient denies symptoms of reyna, anxiety, PTSD at this time. Patient reports some marijuana use, unclear when last used.     Collateral: patient refused permission to speak with patient's father on admission interview today.

## 2021-11-22 NOTE — DISCHARGE NOTE BEHAVIORAL HEALTH - FAMILY HISTORY OF PSYCHIATRIC ILLNESS
-lives with his mother after he was "kicked out of the shelter"  -was arrested for assault to the step-father of his 12yo son  -abuse/trauma hx: bullied by peers

## 2021-11-22 NOTE — DISCHARGE NOTE BEHAVIORAL HEALTH - NSBHDCMEDSFT_PSY_A_CORE
THIS IS AN INCOMPLETE NOTE . FULL NOTE TO FOLLOW SHORTLY -For MDD, patient was given sertraline 50 mg Oral daily, with good response (decreased incidents of crying) and no reported side effects.   -For Psychosis/delusions, patient was started on Seroquel, which was up-titrated to 100 mg qhs with good response (decreased reports of delusions about women and rapping) and no reported side effects.

## 2021-11-23 LAB — SARS-COV-2 RNA SPEC QL NAA+PROBE: SIGNIFICANT CHANGE UP

## 2021-11-23 PROCEDURE — 99232 SBSQ HOSP IP/OBS MODERATE 35: CPT | Mod: GC

## 2021-11-23 RX ADMIN — Medication 50 MILLIGRAM(S): at 20:12

## 2021-11-23 RX ADMIN — SERTRALINE 50 MILLIGRAM(S): 25 TABLET, FILM COATED ORAL at 08:08

## 2021-11-23 RX ADMIN — Medication 1 DROP(S): at 08:09

## 2021-11-23 NOTE — PROGRESS NOTE BEHAVIORAL HEALTH - NSBHCHARTREVIEWVS_PSY_A_CORE FT
Vital Signs Last 24 Hrs  T(C): 36.1 (22 Nov 2021 15:33), Max: 36.1 (22 Nov 2021 15:32)  T(F): 96.9 (22 Nov 2021 15:33), Max: 96.9 (22 Nov 2021 15:32)  HR: 64 (22 Nov 2021 15:33) (64 - 64)  BP: 120/88 (22 Nov 2021 15:33) (120/88 - 120/88)  BP(mean): --  RR: 16 (22 Nov 2021 15:33) (16 - 16)  SpO2: --

## 2021-11-23 NOTE — PROGRESS NOTE BEHAVIORAL HEALTH - SUMMARY
Pt is a 29yo single domiciled male with hx of depression and anxiety and alcohol, benzo, and cannabis abuse, who had frequent ED visits in November 2021. who came to ED today complaining of having depressed mood and wanted to be admitted.     He said he had frequent visits because his mother calling EMS when he drinks or smokes at home. The chart information reveals that pt became verbally violent, e.g. yelling at his mother, neighbours calling 911 at times.  Pt's last IPP at [facility] was in October 2021. He said he did not follow up outpatient service because his vision is bad. He became tearful, stating that his life has been bad. He has children but their mothers would not allow him to see them. He cannot find jobs because his vision is poor. 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 His mother often yells at him. Records also states that he was kicked out of shelter due to his violent behaviors. Pt had hx of multiple IPPs for depression and anxiety.    Pt appears disheveled, depressed, endorsing passive suicidal thought. He denies having manic episode before, or having delusions or auditory hallucinations. His thought is linear and goal directed. He said last time he drank and drunk was two days ago. He denies having withdrawal symptoms. He said he has fatty liver. He said he has depression and anxiety, and wants to resume antidepressant. He does not want to take antipsychotic medication, such as risperidone "it makes me zoning out". Pt denies using heroin or stimulants.    Plan:   #Admission Labs & paperwork   - [x] CBC, CMP, TSH, lipid panel, a1c ordered (for AM day after admission)   - [x] C-SSRS, CGI, AIMS completed  - [x] COVID swab routine (ordered Thursday this week)     #MDD   -sertraline 50 mg Oral daily for MDD   -hydroxyzine hydrochloride 50 mg qhs insomnia   -prednisolone 1% Suspension 1 Drop(s) Both EYES daily for dry eyes     #PRN medications  -artificial tears (preservative free) Ophthalmic Solution 1 Drop(s) Both EYES every 8 hours PRN Dry Eyes  -haloperidol 5 mg q6h PRN agitation  -hydroxyzine hydrochloride 50 mg q6h PRN Anxiety  -lorazepam 2 mg q6h PRN anxiety and withdrawal  -nicotine Polacrilex Gum 2 mg q3h PRN craving Pt is a 31yo single domiciled male with hx of depression and anxiety and alcohol, benzo, and cannabis abuse, who had frequent ED visits in November 2021. who came to ED today complaining of having depressed mood and wanted to be admitted.     He said he had frequent visits because his mother calling EMS when he drinks or smokes at home. The chart information reveals that pt became verbally violent, e.g. yelling at his mother, neighbours calling 911 at times.  Pt's last IPP at [facility] was in October 2021. He said he did not follow up outpatient service because his vision is bad. He became tearful, stating that his life has been bad. He has children but their mothers would not allow him to see them. He cannot find jobs because his vision is poor. 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 His mother often yells at him. Records also states that he was kicked out of shelter due to his violent behaviors. Pt had hx of multiple IPPs for depression and anxiety.    Pt appears disheveled, depressed, endorsing passive suicidal thought. He denies having manic episode before, or having delusions or auditory hallucinations. His thought is linear and goal directed. He said last time he drank and drunk was two days ago. He denies having withdrawal symptoms. He said he has fatty liver. He said he has depression and anxiety, and wants to resume antidepressant. He does not want to take antipsychotic medication, such as risperidone "it makes me zoning out". Pt denies using heroin or stimulants.    Plan:   #MDD   -sertraline 50 mg Oral daily for MDD   -hydroxyzine hydrochloride 50 mg qhs insomnia   -prednisolone 1% Suspension 1 Drop(s) Both EYES daily for dry eyes     #PRN medications  -artificial tears (preservative free) Ophthalmic Solution 1 Drop(s) Both EYES every 8 hours PRN Dry Eyes  -haloperidol 5 mg q6h PRN agitation  -hydroxyzine hydrochloride 50 mg q6h PRN Anxiety  -lorazepam 2 mg q6h PRN anxiety and withdrawal  -nicotine Polacrilex Gum 2 mg q3h PRN craving
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
## 2021-11-24 PROCEDURE — 99232 SBSQ HOSP IP/OBS MODERATE 35: CPT | Mod: GC

## 2021-11-24 RX ADMIN — Medication 1 DROP(S): at 08:38

## 2021-11-24 RX ADMIN — SERTRALINE 50 MILLIGRAM(S): 25 TABLET, FILM COATED ORAL at 08:37

## 2021-11-24 NOTE — BH SAFETY PLAN - LOCAL URGENT CARE NAME
[facility] ([facility]) [facility] Emergency Room

## 2021-11-24 NOTE — PROGRESS NOTE BEHAVIORAL HEALTH - NSBHCHARTREVIEWVS_PSY_A_CORE FT
Vital Signs Last 24 Hrs  T(C): 36.1 (22 Nov 2021 15:33), Max: 36.1 (22 Nov 2021 15:32)  T(F): 96.9 (22 Nov 2021 15:33), Max: 96.9 (22 Nov 2021 15:32)  HR: 64 (22 Nov 2021 15:33) (64 - 64)  BP: 120/88 (22 Nov 2021 15:33) (120/88 - 120/88)  BP(mean): --  RR: 16 (22 Nov 2021 15:33) (16 - 16)  SpO2: -- Vital Signs Last 24 Hrs  T(C): 36.4 (23 Nov 2021 15:58), Max: 36.4 (23 Nov 2021 15:58)  T(F): 97.6 (23 Nov 2021 15:58), Max: 97.6 (23 Nov 2021 15:58)  HR: 60 (23 Nov 2021 15:58) (60 - 60)  BP: 152/77 (23 Nov 2021 15:58) (152/77 - 152/77)  BP(mean): --  RR: 18 (23 Nov 2021 15:58) (18 - 18)  SpO2: --

## 2021-11-24 NOTE — CHART NOTE - NSCHARTNOTEFT_GEN_A_CORE
Social Work Note    Russell remains on the unit for continued treatment, safety, and observation.  Treatment team met with patient on morning rounds.  Patient was calm and cooperative.  He reports feeling tired today “from the medication” he’s taking.  He offered no other complaints.  He endorsed good sleep and appetite.  He denies SI/HI and A/V/H.  Patient has been visible on the unit, interacting with peers and attending groups.  He has periods of irritability and has been observed talking on the phone, yelling/arguing with someone.      SW met with patient to discuss discharge plans.  Patient informs he was recently kicked out of his dad’s house and has been undomiciled.  Patient reports he wants to get his own place when he gets his check next month.  He is not sure where he will go at discharge.  SW will continue to meet with patient to discuss options and help him develop a safe discharge plan.       Mental Status Exam:    Mood – Euthymic   Sleep – Good  Appetite - Good  ADLs – Fair  Thought Process – Linear  Observation – Routine q10.
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
## 2021-11-24 NOTE — PROGRESS NOTE BEHAVIORAL HEALTH - NSBHCHARTREVIEWINVESTIGATE_PSY_A_CORE FT
< from: 12 Lead ECG (11.03.21 @ 12:03) >    QTC Calculation(Bazett) 432 ms    < end of copied text > < from: 12 Lead ECG (11.03.21 @ 12:03) >  QTC Calculation(Bazett) 432 ms  < end of copied text >

## 2021-11-24 NOTE — PROGRESS NOTE BEHAVIORAL HEALTH - NSBHCHARTREVIEWLAB_PSY_A_CORE FT
THC, Urine Qualitative (10.12.21 @ 15:27)    THC, Urine Qualitative: Positive    Benzodiazepine, Urine (10.12.21 @ 15:27)    Benzodiazepine, Urine: Positive      CAPILLARY BLOOD GLUCOSE THC, Urine Qualitative (10.12.21 @ 15:27)    THC, Urine Qualitative: Positive    Benzodiazepine, Urine (10.12.21 @ 15:27)    Benzodiazepine, Urine: Positive

## 2021-11-25 RX ORDER — DIPHENHYDRAMINE HCL 50 MG
50 CAPSULE ORAL ONCE
Refills: 0 | Status: COMPLETED | OUTPATIENT
Start: 2021-11-25 | End: 2021-11-25

## 2021-11-25 RX ORDER — HALOPERIDOL DECANOATE 100 MG/ML
5 INJECTION INTRAMUSCULAR ONCE
Refills: 0 | Status: COMPLETED | OUTPATIENT
Start: 2021-11-25 | End: 2021-11-25

## 2021-11-25 RX ADMIN — HALOPERIDOL DECANOATE 5 MILLIGRAM(S): 100 INJECTION INTRAMUSCULAR at 10:08

## 2021-11-25 RX ADMIN — SERTRALINE 50 MILLIGRAM(S): 25 TABLET, FILM COATED ORAL at 08:20

## 2021-11-25 RX ADMIN — Medication 1 DROP(S): at 08:20

## 2021-11-25 RX ADMIN — Medication 50 MILLIGRAM(S): at 10:08

## 2021-11-25 NOTE — CHART NOTE - NSCHARTNOTEFT_GEN_A_CORE
At 9am today, pt became severely agitated and threatening to "tear down the unit" and "beat everyone up", expressing anger that he couldn't have Italian riceballs for lunch today. He was not redirectable and was slamming doors and becoming intrusive and threatening with staff.     Pt required IM haldol 5mg and IM benadryl 50mg. Given with good effect.

## 2021-11-26 PROCEDURE — 99232 SBSQ HOSP IP/OBS MODERATE 35: CPT | Mod: GC

## 2021-11-26 RX ORDER — QUETIAPINE FUMARATE 200 MG/1
50 TABLET, FILM COATED ORAL AT BEDTIME
Refills: 0 | Status: DISCONTINUED | OUTPATIENT
Start: 2021-11-26 | End: 2021-11-29

## 2021-11-26 RX ORDER — QUETIAPINE FUMARATE 200 MG/1
25 TABLET, FILM COATED ORAL EVERY 6 HOURS
Refills: 0 | Status: DISCONTINUED | OUTPATIENT
Start: 2021-11-26 | End: 2021-11-30

## 2021-11-26 RX ADMIN — QUETIAPINE FUMARATE 50 MILLIGRAM(S): 200 TABLET, FILM COATED ORAL at 20:49

## 2021-11-26 RX ADMIN — SERTRALINE 50 MILLIGRAM(S): 25 TABLET, FILM COATED ORAL at 08:11

## 2021-11-26 RX ADMIN — Medication 50 MILLIGRAM(S): at 20:55

## 2021-11-26 RX ADMIN — Medication 1 DROP(S): at 08:11

## 2021-11-26 RX ADMIN — QUETIAPINE FUMARATE 25 MILLIGRAM(S): 200 TABLET, FILM COATED ORAL at 20:51

## 2021-11-26 RX ADMIN — Medication 1 DROP(S): at 20:06

## 2021-11-26 NOTE — PROGRESS NOTE BEHAVIORAL HEALTH - NSBHFUPINTERVALHXFT_PSY_A_CORE
THIS IS AN INCOMPLETE NOTE . FULL NOTE TO FOLLOW SHORTLY Nursing reports that yesterday, patient become agitated in the context of not receiving a food item that his father brought in for him on Thanksgiving, and received IM haldol 5mg and benadryl 50mg.     Chart reviewed, patient seen and evaluated in AM during teams meeting. On evaluation, patient reports that he apologized for the incident yesterday - states that he was upset because his father brought the food up from [place], where his father reportedly lives. Patient endorsed good sleep and appetite.  Patient reports that he spoke with his father, and patient reports that his father is on board to take him back home on Tuesday next week. Patient provided permission for team to speak with his father. Patient states that his mother is currently not speaking with him, and that she is not in the picture in terms of discharge. Patient reports no suicidal ideation. Denied auditory/visual hallucinations. Patient compliant with medications; reports no side effects of medications.    Called patient's father, Rosaline Law at the provided number ([phone]), but was unable to reach. No answering  at this number, so unable to leave a message. Nursing reports that yesterday, patient become agitated in the context of not receiving a food item that his father brought in for him on Thanksgiving, and received IM haldol 5mg and benadryl 50mg.     Chart reviewed, patient seen and evaluated in AM during teams meeting. On approach, patient hunched over slightly in seat, fidgeting throughout interview, smiling at somewhat odd times, but cooperative to interview. Patient reports that he apologized for the incident yesterday - states that he was upset because his father brought the food up from [place], where his father reportedly lives. Patient endorsed good sleep and appetite.  Patient reports that he spoke with his father, and patient reports that his father is on board to take him back home on Tuesday next week. Patient provided permission for team to speak with his father. Patient states that his mother is currently not speaking with him, and that she is not in the picture in terms of discharge. Patient reports no suicidal ideation. Denied auditory/visual hallucinations. Patient compliant with medications; reports no side effects of medications.    Called patient's father, Rosaline Law at the provided number ([phone]), but was unable to reach. No answering  at this number, so unable to leave a message. Nursing reports that yesterday, patient become agitated in the context of not receiving a food item that his father brought in for him on Thanksgiving, and received IM haldol 5mg and benadryl 50mg.     Chart reviewed, patient seen and evaluated in AM during teams meeting. On approach, patient hunched over slightly in seat, fidgeting throughout interview, smiling at somewhat odd times, but cooperative to interview. Patient reports that he apologized for the incident yesterday - states that he was upset because his father brought the food up from [place], where his father reportedly lives. Patient endorsed good sleep and appetite.  Patient reports that he spoke with his father, and patient reports that his father is on board to take him back home on Tuesday next week. Patient provided permission for team to speak with his father. Patient states that his mother is currently not speaking with him, and that she is not in the picture in terms of discharge. Patient reports no suicidal ideation. Denied auditory/visual hallucinations. Patient compliant with medications; reports no side effects of medications. Patient stated that his is not amenable to taking antipsychotic such as Seroquel at this time, stating "only one pill per day... I don't want all these medications."     Called patient's father, Rosaline Law, multiple times at the provided number ([phone]), but was unable to reach. No answering  at this number, so unable to leave a message. Nursing reports that yesterday, patient become agitated in the context of not receiving a food item that his father brought in for him on Thanksgiving, and received IM haldol 5mg and benadryl 50mg.     Chart reviewed, patient seen and evaluated in AM during teams meeting. On approach, patient hunched over slightly in seat, fidgeting throughout interview, smiling at somewhat odd times, but cooperative to interview. Patient reports that he apologized for the incident yesterday - states that he was upset because his father brought the food up from [place], where his father reportedly lives. Patient endorsed good sleep and appetite.  Patient reports that he spoke with his father, and patient reports that his father is on board to take him back home on Tuesday next week. Patient provided permission for team to speak with his father. Patient states that his mother is currently not speaking with him, and that she is not in the picture in terms of discharge. Patient reports no suicidal ideation. Denied auditory/visual hallucinations. Patient compliant with medications; reports no side effects of medications. Patient stated that his is not amenable to taking antipsychotic such as Seroquel at this time, stating "only one pill per day... I don't want all these medications."     Called patient's father, Rosaline Law ([phone]). Patient's father confirmed that patient was very discharged-focused and contacted him about it on the phone. Patient's father expressed that he is OK with patient returning home with him upon discharge once patient is psychiatrically-stable, but that he has some concerns about patient's current state. Patient's father stated that prior to admission, patient was acting strangely on the street, talking to himself, which was his father's primary concern. Father states that patient has not been taking medications consistently, because he hasn't been taking them because he "forgets" because his "mind is somewhere else." Patient's father states that he isn't sure who prescribed the patient's psychiatric medications. Reports that prior to admission, patient last took psych meds 3 weeks ago. Reports that patient has limited financial resources, but receives unemployment money regularly and is looking for a place to live longer-term. Nursing reports that yesterday, patient become agitated in the context of not receiving a food item that his father brought in for him on Thanksgiving, and received IM haldol 5mg and benadryl 50mg.     Chart reviewed, patient seen and evaluated in AM during teams meeting. On approach, patient hunched over slightly in seat, fidgeting throughout interview, smiling at somewhat odd times, but cooperative to interview. Patient reports that he apologized for the incident yesterday - states that he was upset because his father brought the food up from [place], where his father reportedly lives. Patient endorsed good sleep and appetite.  Patient reports that he spoke with his father, and patient reports that his father is on board to take him back home on Tuesday next week. Patient provided permission for team to speak with his father. Patient states that his mother is currently not speaking with him, and that she is not in the picture in terms of discharge. Patient reports no suicidal ideation. Denied auditory/visual hallucinations. Patient compliant with medications; reports no side effects of medications. Patient stated that his is not amenable to taking antipsychotic such as Seroquel at this time, stating "only one pill per day... I don't want all these medications."     Called patient's father, Rosaline Law ([phone]). Patient's father confirmed that patient was very discharged-focused and contacted him about it on the phone. Patient's father expressed that he is OK with patient returning home with him upon discharge once patient is psychiatrically-stable, but that he has some concerns about patient's current state. Patient's father stated that prior to admission, patient was acting strangely on the street, talking to himself, which was his father's primary concern. Father states that patient has not been taking medications consistently, because he hasn't been taking them because he "forgets" because his "mind is somewhere else." Patient's father states that he isn't sure who prescribed the patient's psychiatric medications. Reports that prior to admission, patient last took psych meds 3 weeks ago. Reports that patient has limited financial resources, but receives unemployment money regularly and is looking for a place to live longer-term.    In afternoon, after patient had discussion with his father, patient stated that he was amenable to beginning neuroleptic medication. Started on seroquel.

## 2021-11-26 NOTE — PROGRESS NOTE BEHAVIORAL HEALTH - SUMMARY
Pt is a 31yo single domiciled male with hx of depression and anxiety and alcohol, benzo, and cannabis abuse, who had frequent ED visits in November 2021. who came to ED today complaining of having depressed mood and wanted to be admitted.     He said he had frequent visits because his mother calling EMS when he drinks or smokes at home. The chart information reveals that pt became verbally violent, e.g. yelling at his mother, neighbours calling 911 at times.  Pt's last IPP at [facility] was in October 2021. He said he did not follow up outpatient service because his vision is bad. He became tearful, stating that his life has been bad. He has children but their mothers would not allow him to see them. He cannot find jobs because his vision is poor. 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 His mother often yells at him. Records also states that he was kicked out of shelter due to his violent behaviors. Pt had hx of multiple IPPs for depression and anxiety.    Pt appears disheveled, depressed, endorsing passive suicidal thought. He denies having manic episode before, or having delusions or auditory hallucinations. His thought is linear and goal directed. He said last time he drank and drunk was two days ago. He denies having withdrawal symptoms. He said he has fatty liver. He said he has depression and anxiety, and wants to resume antidepressant. He does not want to take antipsychotic medication, such as risperidone "it makes me zoning out". Pt denies using heroin or stimulants.    Plan:   #MDD, c/w the following:   -sertraline 50 mg Oral daily for MDD   -hydroxyzine hydrochloride 50 mg qhs insomnia   -prednisolone 1% Suspension 1 Drop(s) Both EYES daily for dry eyes     #PRN medications  -artificial tears (preservative free) Ophthalmic Solution 1 Drop(s) Both EYES every 8 hours PRN Dry Eyes  -haloperidol 5 mg q6h PRN agitation  -hydroxyzine hydrochloride 50 mg q6h PRN Anxiety  -lorazepam 2 mg q6h PRN anxiety and withdrawal  -nicotine Polacrilex Gum 2 mg q3h PRN craving Pt is a 29yo single domiciled male with hx of depression and anxiety and alcohol, benzo, and cannabis abuse, who had frequent ED visits in November 2021. who came to ED today complaining of having depressed mood and wanted to be admitted.     He said he had frequent visits because his mother calling EMS when he drinks or smokes at home. The chart information reveals that pt became verbally violent, e.g. yelling at his mother, neighbours calling 911 at times.  Pt's last IPP at [facility] was in October 2021. He said he did not follow up outpatient service because his vision is bad. He became tearful, stating that his life has been bad. He has children but their mothers would not allow him to see them. He cannot find jobs because his vision is poor. 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 His mother often yells at him. Records also states that he was kicked out of shelter due to his violent behaviors. Pt had hx of multiple IPPs for depression and anxiety.    Pt appears disheveled, depressed, endorsing passive suicidal thought. He denies having manic episode before, or having delusions or auditory hallucinations. His thought is linear and goal directed. He said last time he drank and drunk was two days ago. He denies having withdrawal symptoms. He said he has fatty liver. He said he has depression and anxiety, and wants to resume antidepressant. He does not want to take antipsychotic medication, such as risperidone "it makes me zoning out". Pt denies using heroin or stimulants.    Plan:   #MDD, c/w the following:   -sertraline 50 mg Oral daily for MDD   -hydroxyzine hydrochloride 50 mg qhs insomnia   -prednisolone 1% Suspension 1 Drop(s) Both EYES daily for dry eyes     #Psychosis/delusions  - Seroquel 50 mg qhs (started 11/26/21)  - Seroquel 25 mg q6h PRN (started 11/26/21)    #PRN medications  -artificial tears (preservative free) Ophthalmic Solution 1 Drop(s) Both EYES every 8 hours PRN Dry Eyes  -haloperidol 5 mg q6h PRN agitation  -hydroxyzine hydrochloride 50 mg q6h PRN Anxiety  -lorazepam 2 mg q6h PRN anxiety and withdrawal  -nicotine Polacrilex Gum 2 mg q3h PRN craving

## 2021-11-26 NOTE — CHART NOTE - NSCHARTNOTEFT_GEN_A_CORE
Social Work Note    Russell remains on the unit for continued treatment, safety, and observation.  Patient attended treatment team meeting today.  Per nursing report, patient became agitated yesterday and required IM haldol and Benadryl.  Today, he presents as calm and cooperative.  He apologized for his behavior yesterday.  He endorses good mood, sleep, and appetite.  Patient has been taking his medications as prescribed. He denies SI/HI and A/V/H.      Treatment team discussed discharge plans with patient.  Patient reports that he spoke to his father (Rosaline [phone]) who agreed that patient can come stay with him in [place] at discharge.  Patient is agreeable to a referral for outpatient treatment in [place].      At this time, patient is not psychiatrically stable for discharge.      Mental Status Exam:    Mood – Euthymic   Sleep – Good  Appetite - Good  ADLs – Fair  Thought Process – Linear  Observation – Routine q10.

## 2021-11-27 RX ADMIN — QUETIAPINE FUMARATE 50 MILLIGRAM(S): 200 TABLET, FILM COATED ORAL at 20:33

## 2021-11-27 RX ADMIN — Medication 1 DROP(S): at 08:36

## 2021-11-27 RX ADMIN — SERTRALINE 50 MILLIGRAM(S): 25 TABLET, FILM COATED ORAL at 08:36

## 2021-11-27 RX ADMIN — Medication 50 MILLIGRAM(S): at 20:35

## 2021-11-28 RX ADMIN — Medication 50 MILLIGRAM(S): at 20:36

## 2021-11-28 RX ADMIN — SERTRALINE 50 MILLIGRAM(S): 25 TABLET, FILM COATED ORAL at 08:58

## 2021-11-28 RX ADMIN — Medication 2 MILLIGRAM(S): at 16:03

## 2021-11-28 RX ADMIN — Medication 1 DROP(S): at 08:57

## 2021-11-28 RX ADMIN — QUETIAPINE FUMARATE 50 MILLIGRAM(S): 200 TABLET, FILM COATED ORAL at 20:36

## 2021-11-28 RX ADMIN — Medication 1 DROP(S): at 20:46

## 2021-11-29 LAB — SARS-COV-2 RNA SPEC QL NAA+PROBE: SIGNIFICANT CHANGE UP

## 2021-11-29 PROCEDURE — 99232 SBSQ HOSP IP/OBS MODERATE 35: CPT | Mod: GC

## 2021-11-29 RX ORDER — QUETIAPINE FUMARATE 200 MG/1
1 TABLET, FILM COATED ORAL
Qty: 14 | Refills: 0
Start: 2021-11-29 | End: 2021-12-12

## 2021-11-29 RX ORDER — QUETIAPINE FUMARATE 200 MG/1
100 TABLET, FILM COATED ORAL AT BEDTIME
Refills: 0 | Status: DISCONTINUED | OUTPATIENT
Start: 2021-11-29 | End: 2021-11-30

## 2021-11-29 RX ORDER — SERTRALINE 25 MG/1
1 TABLET, FILM COATED ORAL
Qty: 14 | Refills: 0
Start: 2021-11-29 | End: 2021-12-12

## 2021-11-29 RX ORDER — HALOPERIDOL DECANOATE 100 MG/ML
5 INJECTION INTRAMUSCULAR ONCE
Refills: 0 | Status: COMPLETED | OUTPATIENT
Start: 2021-11-29 | End: 2021-11-29

## 2021-11-29 RX ORDER — DIPHENHYDRAMINE HCL 50 MG
50 CAPSULE ORAL ONCE
Refills: 0 | Status: COMPLETED | OUTPATIENT
Start: 2021-11-29 | End: 2021-11-29

## 2021-11-29 RX ADMIN — Medication 50 MILLIGRAM(S): at 20:20

## 2021-11-29 RX ADMIN — SERTRALINE 50 MILLIGRAM(S): 25 TABLET, FILM COATED ORAL at 08:14

## 2021-11-29 RX ADMIN — QUETIAPINE FUMARATE 100 MILLIGRAM(S): 200 TABLET, FILM COATED ORAL at 20:14

## 2021-11-29 RX ADMIN — Medication 1 DROP(S): at 08:15

## 2021-11-29 RX ADMIN — Medication 1 DROP(S): at 08:14

## 2021-11-29 RX ADMIN — Medication 1 DROP(S): at 20:23

## 2021-11-29 RX ADMIN — Medication 2 MILLIGRAM(S): at 10:29

## 2021-11-29 NOTE — PROGRESS NOTE BEHAVIORAL HEALTH - CASE SUMMARY
No s/h ideation or overt psychosis.  Pt's irritability noted, but this appears primarily around issues of deposition planning.  Pt states he is ready to go home and his father is in agreement with plan.  He does not meet criteria for involuntary hospitalization at this time.  He agrees to increase seroquel to address sleep and as part of titration which can be continued/adjusted as outpatient.
as noted
as noted
Behaviors as noted above.  He is not sexually preoccupied, and there are no delusions regarding being a rap star. Continue tx plan and psychoed.  Pt is undomiciled and unstable for d/c
Pt is grandiose and sexually preoccupied. No s/h ideation. At this time he is refusing neuroleptic ("I don't need").  Will continue to monitor; psychoed.

## 2021-11-29 NOTE — PROGRESS NOTE BEHAVIORAL HEALTH - NSBHCHARTREVIEWVS_PSY_A_CORE FT
T(F): 97.1 (11-28-21 @ 16:00), Max: 97.1 (11-28-21 @ 16:00)  HR: 65 (11-28-21 @ 16:00) (65 - 65)  BP: 110/75 (11-28-21 @ 16:00) (110/75 - 110/75)  BP(mean): --  ABP: --  ABP(mean): --  RR: 18 (11-28-21 @ 16:00) (18 - 18)  SpO2: --

## 2021-11-29 NOTE — PROGRESS NOTE BEHAVIORAL HEALTH - AXIS III
impaired vision

## 2021-11-29 NOTE — PROGRESS NOTE BEHAVIORAL HEALTH - MODIFICATIONS
seen and discussed with resident
seen and discussed with resident.  As noted pt now agrees to medication as recommended.  Will monitor.  No s/h ideation or overt psychosis
seen and discussed with resident.  Mood neutral; some irritability as well as limited insight.  He remains sexually preoccupied. Will continue to assess.  At this time pt is undomiciled.  Encourage med compliance as well aspossibilty of rehab placement
seen and discussed with resident.
seen and discussed with resident.

## 2021-11-29 NOTE — PROGRESS NOTE BEHAVIORAL HEALTH - NSBHADMITIPOBS_PSY_A_CORE
Enhanced supervision
Routine observation
Enhanced supervision
Routine observation
Enhanced supervision

## 2021-11-29 NOTE — PROGRESS NOTE BEHAVIORAL HEALTH - NSBHFUPTYPE_PSY_A_CORE
Inpatient
Inpatient-On Service Note
Inpatient
Inpatient-On Service Note
Inpatient

## 2021-11-29 NOTE — PROGRESS NOTE BEHAVIORAL HEALTH - NSBHLEGALSTATUS_PSY_A_CORE
9.13 (Voluntary)
9.39 (Emergency)
9.13 (Voluntary)
9.39 (Emergency)
9.13 (Voluntary)

## 2021-11-29 NOTE — PROGRESS NOTE BEHAVIORAL HEALTH - NSBHFUPINTERVALHXFT_PSY_A_CORE
THIS IS AN INCOMPLETE NOTE . FULL NOTE TO FOLLOW SHORTLY Nursing reports no acute events overnight. Per staff, patient has been irritable, easily agitated, and has risk for aggression.     Chart reviewed, patient seen and evaluated in AM. On evaluation, patient reports, "I'm good" and is discharge-focused. Patient denies other complaints. Patient endorsed good sleep and appetite. Patient states that his father is on board to take him back home soon. Patient reports no suicidal ideation. Denied auditory/visual hallucinations. Denied paranoia. Patient compliant with medications; reports no side effects of medications.     Called patient's father Rosaline Law ([phone]). States that patient seems better since admission. States that he hopes that patient continues taking medications. Discussed that patient should continue with outpatient psychiatrist upon discharge, and stressed that importance of continued taking medications following discharge. Patient's father states that he is amenable for patient to come home when psychiatrically stable.    Possible discharge tomorrow 11/30. Nursing reports no acute events overnight. Per staff, patient has been irritable, easily agitated, and has risk for aggression.     Chart reviewed, patient seen and evaluated in AM. On evaluation, patient reports, "I'm good" and is discharge-focused. Patient denies other complaints. Patient endorsed good sleep and appetite. Patient states that his father is on board to take him back home soon. Patient reports no suicidal ideation. Denied auditory/visual hallucinations. Denied paranoia. Patient compliant with medications; reports no side effects of medications.     Called patient's father Yazanchocoros Law ([phone]). States that patient seems better since admission. States that he hopes patient continues taking medications. Discussed that patient should continue with outpatient psychiatrist upon discharge, and stressed that importance of continued taking medications following discharge. Patient's father states that he is amenable for patient to come home when stable.    Possible discharge tomorrow 11/30.

## 2021-11-29 NOTE — CHART NOTE - NSCHARTNOTEFT_GEN_A_CORE
Social Work Note    Russell remains on the unit for continued treatment, safety, and observation.  Per nursing report, patient was in good behavioral control over the weekend.  Treatment team met with patient on morning rounds.  He was calm and cooperative, but fixated on discharge.  He endorsed good mood, sleep, and appetite.  He denies SI/HI and A/V/H.  He reports he will be staying with his father in [place] at discharge and would like to leave tomorrow.     SW spoke to patients father ([facility] [phone]) on this date.  He confirmed that patient can stay with him at discharge.  He did not verbalized any safety concerns.     Anticipated d/c for tomorrow 11/30    Mental Status Exam:    Mood – Euthymic   Sleep – Good  Appetite - Good  ADLs – Good  Thought Process – Linear  Observation – Routine q10. Social Work Note    Russell remains on the unit for continued treatment, safety, and observation.  Per nursing report, patient was in good behavioral control over the weekend.  Treatment team met with patient on morning rounds.  He was calm and cooperative, but fixated on discharge.  He endorsed good mood, sleep, and appetite.  He denies SI/HI and A/V/H.  He reports he will be staying with his father in [place] at discharge and would like to leave tomorrow.     SW spoke to patients father (Rosaline Scidone [phone]) on this date.  He confirmed that patient can stay with him at discharge.  He did not verbalize any safety concerns.     Anticipated d/c for tomorrow 11/30    Mental Status Exam:    Mood – Euthymic   Sleep – Good  Appetite - Good  ADLs – Good  Thought Process – Linear  Observation – Routine q10.

## 2021-11-29 NOTE — PROGRESS NOTE BEHAVIORAL HEALTH - SUMMARY
Pt is a 31yo single domiciled male with hx of depression and anxiety and alcohol, benzo, and cannabis abuse, who had frequent ED visits in November 2021. who came to ED today complaining of having depressed mood and wanted to be admitted.     He said he had frequent visits because his mother calling EMS when he drinks or smokes at home. The chart information reveals that pt became verbally violent, e.g. yelling at his mother, neighbours calling 911 at times.  Pt's last IPP at [facility] was in October 2021. He said he did not follow up outpatient service because his vision is bad. He became tearful, stating that his life has been bad. He has children but their mothers would not allow him to see them. He cannot find jobs because his vision is poor. 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 His mother often yells at him. Records also states that he was kicked out of shelter due to his violent behaviors. Pt had hx of multiple IPPs for depression and anxiety.    Pt appears disheveled, depressed, endorsing passive suicidal thought. He denies having manic episode before, or having delusions or auditory hallucinations. His thought is linear and goal directed. He said last time he drank and drunk was two days ago. He denies having withdrawal symptoms. He said he has fatty liver. He said he has depression and anxiety, and wants to resume antidepressant. He does not want to take antipsychotic medication, such as risperidone "it makes me zoning out". Pt denies using heroin or stimulants.    Plan:   #MDD, c/w the following:   -sertraline 50 mg Oral daily for MDD   -hydroxyzine hydrochloride 50 mg qhs insomnia   -prednisolone 1% Suspension 1 Drop(s) Both EYES daily for dry eyes     #Psychosis/delusions  - Seroquel 50 mg qhs (started 11/26/21)  - Seroquel 25 mg q6h PRN (started 11/26/21)    #PRN medications  -artificial tears (preservative free) Ophthalmic Solution 1 Drop(s) Both EYES every 8 hours PRN Dry Eyes  -haloperidol 5 mg q6h PRN agitation  -hydroxyzine hydrochloride 50 mg q6h PRN Anxiety  -lorazepam 2 mg q6h PRN anxiety and withdrawal  -nicotine Polacrilex Gum 2 mg q3h PRN craving Pt is a 31yo single domiciled male with hx of depression and anxiety and alcohol, benzo, and cannabis abuse, who had frequent ED visits in November 2021. who came to ED today complaining of having depressed mood and wanted to be admitted.     He said he had frequent visits because his mother calling EMS when he drinks or smokes at home. The chart information reveals that pt became verbally violent, e.g. yelling at his mother, neighbours calling 911 at times.  Pt's last IPP at [facility] was in October 2021. He said he did not follow up outpatient service because his vision is bad. He became tearful, stating that his life has been bad. He has children but their mothers would not allow him to see them. He cannot find jobs because his vision is poor. 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 His mother often yells at him. Records also states that he was kicked out of shelter due to his violent behaviors. Pt had hx of multiple IPPs for depression and anxiety.    Pt appears disheveled, depressed, endorsing passive suicidal thought. He denies having manic episode before, or having delusions or auditory hallucinations. His thought is linear and goal directed. He said last time he drank and drunk was two days ago. He denies having withdrawal symptoms. He said he has fatty liver. He said he has depression and anxiety, and wants to resume antidepressant. He does not want to take antipsychotic medication, such as risperidone "it makes me zoning out". Pt denies using heroin or stimulants.    Plan:   #MDD, c/w the following:   -sertraline 50 mg Oral daily for MDD   -hydroxyzine hydrochloride 50 mg qhs insomnia   -prednisolone 1% Suspension 1 Drop(s) Both EYES daily for dry eyes     #Psychosis/delusions  - Seroquel 100 mg qhs (increased on 11/29, started 11/26/21)  - Seroquel 25 mg q6h PRN (started 11/26/21)    #PRN medications  -artificial tears (preservative free) Ophthalmic Solution 1 Drop(s) Both EYES every 8 hours PRN Dry Eyes  -haloperidol 5 mg q6h PRN agitation  -hydroxyzine hydrochloride 50 mg q6h PRN Anxiety  -lorazepam 2 mg q6h PRN anxiety and withdrawal  -nicotine Polacrilex Gum 2 mg q3h PRN craving Pt is a 31yo single domiciled male with hx of depression and anxiety and alcohol, benzo, and cannabis abuse, who had frequent ED visits in November 2021. who came to ED today complaining of having depressed mood and wanted to be admitted.     He said he had frequent visits because his mother calling EMS when he drinks or smokes at home. The chart information reveals that pt became verbally violent, e.g. yelling at his mother, neighbours calling 911 at times.  Pt's last IPP at [facility] was in October 2021. He said he did not follow up outpatient service because his vision is bad. He became tearful, stating that his life has been bad. He has children but their mothers would not allow him to see them. He cannot find jobs because his vision is poor. 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 His mother often yells at him. Records also states that he was kicked out of shelter due to his violent behaviors. Pt had hx of multiple IPPs for depression and anxiety.    Pt appears disheveled, depressed, endorsing passive suicidal thought. He denies having manic episode before, or having delusions or auditory hallucinations. His thought is linear and goal directed. He said last time he drank and drunk was two days ago. He denies having withdrawal symptoms. He said he has fatty liver. He said he has depression and anxiety, and wants to resume antidepressant. He does not want to take antipsychotic medication, such as risperidone "it makes me zoning out". Pt denies using heroin or stimulants.    Plan:   #MDD, c/w the following:   -sertraline 50 mg Oral daily for MDD   -hydroxyzine hydrochloride 50 mg qhs insomnia   -prednisolone 1% Suspension 1 Drop(s) Both EYES daily for dry eyes     #Psychosis/delusions  - Seroquel 100 mg qhs (increased on 11/29, started 11/26/21)  - Seroquel 25 mg q6h PRN (started 11/26/21)    #PRN medications  -artificial tears (preservative free) Ophthalmic Solution 1 Drop(s) Both EYES every 8 hours PRN Dry Eyes  -haloperidol 5 mg q6h PRN agitation  -hydroxyzine hydrochloride 50 mg q6h PRN Anxiety  -lorazepam 2 mg q6h PRN anxiety and withdrawal  -nicotine Polacrilex Gum 2 mg q3h PRN craving    #Other  - medications reconciled and sent to nearest [facility] pharmacy ([address])

## 2021-11-29 NOTE — PROGRESS NOTE BEHAVIORAL HEALTH - SECONDARY DX3
Cannabis intoxication, uncomplicated

## 2021-11-29 NOTE — PROGRESS NOTE BEHAVIORAL HEALTH - NSBHPTASSESSDT_PSY_A_CORE
29-Nov-2021 09:16
23-Nov-2021 09:06
Admission Reconciliation is Completed  Discharge Reconciliation is Completed
22-Nov-2021 09:56
21-Nov-2021 22:10
26-Nov-2021 09:02
20-Nov-2021 10:51
24-Nov-2021 10:01

## 2021-11-29 NOTE — PROGRESS NOTE BEHAVIORAL HEALTH - SECONDARY DX1
Anxiety disorder
Alcohol abuse
Anxiety disorder
Alcohol abuse
Anxiety disorder

## 2021-11-30 VITALS — TEMPERATURE: 99 F | SYSTOLIC BLOOD PRESSURE: 115 MMHG | HEART RATE: 80 BPM | DIASTOLIC BLOOD PRESSURE: 84 MMHG

## 2021-11-30 RX ADMIN — SERTRALINE 50 MILLIGRAM(S): 25 TABLET, FILM COATED ORAL at 08:15

## 2021-11-30 RX ADMIN — Medication 1 DROP(S): at 08:15

## 2021-11-30 NOTE — CHART NOTE - NSCHARTNOTEFT_GEN_A_CORE
Social Work Discharge Note:    Patient is for discharge today.   He is alert and oriented x3.  Mood has improved.  Anxiety has decreased.  Insight and judgment have improved.  Suicidal/homicidal ideation denied.     Patient will be discharged [place] [address]    He has been referred to [facility] for continued treatment in the community.    Communication with patients father Mariano Scidone [phone] occurred prior to discharge.  No safety concerns were verbalized.      Patient is aware and agreeable to discharge today.

## 2021-11-30 NOTE — CHART NOTE - NSCHARTNOTEFT_GEN_A_CORE
Patient's mood and psychosis have improved since admission. Insight and judgement have improved. Patient denies suicidal ideation or homicidal ideation. Patient is suitable for discharge today.    Patient expected to be picked up by his father at 3pm or 4pm today. Patient's mood and psychosis have improved since admission. Insight and judgement have improved. Patient denies suicidal ideation or homicidal ideation. Patient is suitable for discharge today.    Patient expected to be picked up by his father by 3pm/4pm today. Patient's mood and psychosis have improved since admission. Insight and judgement have improved. Patient denies suicidal ideation or homicidal ideation. Patient is suitable for discharge today.    Patient expected to be picked up by his father by 3pm/4pm today. Medications have been sent to nearest [facility] pharmacy on [address].

## 2021-11-30 NOTE — CHART NOTE - NSCHARTNOTEFT_GEN_A_CORE
No psychosis or s/h ideation.  pt has outpt f/u in place and is domiciled.  Compliance with meds and sobriety both highly encouraged.

## 2021-12-04 DIAGNOSIS — F22 DELUSIONAL DISORDERS: ICD-10-CM
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
## 2021-12-04 DIAGNOSIS — F41.9 ANXIETY DISORDER, UNSPECIFIED: ICD-10-CM

## 2021-12-04 DIAGNOSIS — F10.10 ALCOHOL ABUSE, UNCOMPLICATED: ICD-10-CM

## 2021-12-04 DIAGNOSIS — Z88.0 ALLERGY STATUS TO PENICILLIN: ICD-10-CM

## 2021-12-04 DIAGNOSIS — Z94.7 CORNEAL TRANSPLANT STATUS: ICD-10-CM

## 2021-12-04 DIAGNOSIS — Z81.8 FAMILY HISTORY OF OTHER MENTAL AND BEHAVIORAL DISORDERS: ICD-10-CM

## 2021-12-04 DIAGNOSIS — F29 UNSPECIFIED PSYCHOSIS NOT DUE TO A SUBSTANCE OR KNOWN PHYSIOLOGICAL CONDITION: ICD-10-CM

## 2021-12-04 DIAGNOSIS — Z79.899 OTHER LONG TERM (CURRENT) DRUG THERAPY: ICD-10-CM

## 2021-12-04 DIAGNOSIS — F33.1 MAJOR DEPRESSIVE DISORDER, RECURRENT, MODERATE: ICD-10-CM

## 2021-12-04 DIAGNOSIS — F17.210 NICOTINE DEPENDENCE, CIGARETTES, UNCOMPLICATED: ICD-10-CM

## 2021-12-04 DIAGNOSIS — Z59.02 UNSHELTERED HOMELESSNESS: ICD-10-CM

## 2021-12-04 DIAGNOSIS — J45.909 UNSPECIFIED ASTHMA, UNCOMPLICATED: ICD-10-CM

## 2021-12-04 DIAGNOSIS — H54.7 UNSPECIFIED VISUAL LOSS: ICD-10-CM

## 2021-12-04 DIAGNOSIS — Z87.828 PERSONAL HISTORY OF OTHER (HEALED) PHYSICAL INJURY AND TRAUMA: ICD-10-CM
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
## 2021-12-04 DIAGNOSIS — F12.920 CANNABIS USE, UNSPECIFIED WITH INTOXICATION, UNCOMPLICATED: ICD-10-CM

## 2021-12-04 DIAGNOSIS — Z79.52 LONG TERM (CURRENT) USE OF SYSTEMIC STEROIDS: ICD-10-CM

## 2021-12-04 DIAGNOSIS — F13.10 SEDATIVE, HYPNOTIC OR ANXIOLYTIC ABUSE, UNCOMPLICATED: ICD-10-CM

## 2021-12-04 DIAGNOSIS — R45.1 RESTLESSNESS AND AGITATION: ICD-10-CM

## 2021-12-04 SDOH — ECONOMIC STABILITY - HOUSING INSECURITY: UNSHELTERED HOMELESSNESS: Z59.02

## 2021-12-10 NOTE — ED BEHAVIORAL HEALTH ASSESSMENT NOTE - NS ED BHA BILLING ATTENDING WO NP TRAINEE
Notified of critical labs: Absolute neutrophil 0.3 & Plts 19. Dr. Cruz & Dr. Calderon notified via [employer] @ [id number].   [id number]

## 2022-01-19 NOTE — ED PROVIDER NOTE - COVID-19 RESULT DATE/TIME
Duodenal stent in place on imaging. Will alert Dr. Iniguez of patient's admission.    14-Oct-2021 18:48

## 2022-05-07 NOTE — H&P ADULT - NSCORESITESY/N_GEN_A_CORE_RD

## 2022-07-29 NOTE — DISCHARGE NOTE BEHAVIORAL HEALTH - NSBHDCREFERSUBST_PSY_A_CORE
Care Due:                  Date            Visit Type   Department     Provider  --------------------------------------------------------------------------------                                EP -                              PRIMARY      [facility] PRIMARY  Last Visit: 12-      CARE ([facility])   CARE           Eliecer Arceo                              [facility] PRIMARY  Next Visit: 08-      CARE ([facility])   CARE           Eliecer Arceo                                                            Last  Test          Frequency    Reason                     Performed    Due Date  --------------------------------------------------------------------------------    Uric Acid...  12 months..  colchicine...............  Not Found    Overdue    [employer] Embedded Care Gaps. Reference number: [id number]. 7/29/2022   6:13:36 AM CDT   yes... no

## 2022-11-24 ENCOUNTER — EMERGENCY (EMERGENCY)
Facility: HOSPITAL | Age: 32
LOS: 0 days | Discharge: HOME | End: 2022-11-24
Attending: EMERGENCY MEDICINE | Admitting: EMERGENCY MEDICINE

## 2022-11-24 VITALS
SYSTOLIC BLOOD PRESSURE: 140 MMHG | DIASTOLIC BLOOD PRESSURE: 83 MMHG | TEMPERATURE: 98 F | RESPIRATION RATE: 18 BRPM | HEART RATE: 90 BPM | OXYGEN SATURATION: 100 %

## 2022-11-24 DIAGNOSIS — F90.9 ATTENTION-DEFICIT HYPERACTIVITY DISORDER, UNSPECIFIED TYPE: ICD-10-CM

## 2022-11-24 DIAGNOSIS — F32.A DEPRESSION, UNSPECIFIED: ICD-10-CM

## 2022-11-24 DIAGNOSIS — R50.9 FEVER, UNSPECIFIED: ICD-10-CM

## 2022-11-24 DIAGNOSIS — M79.10 MYALGIA, UNSPECIFIED SITE: ICD-10-CM

## 2022-11-24 DIAGNOSIS — J45.909 UNSPECIFIED ASTHMA, UNCOMPLICATED: ICD-10-CM

## 2022-11-24 DIAGNOSIS — R05.9 COUGH, UNSPECIFIED: ICD-10-CM

## 2022-11-24 DIAGNOSIS — Z20.822 CONTACT WITH AND (SUSPECTED) EXPOSURE TO COVID-19: ICD-10-CM

## 2022-11-24 DIAGNOSIS — F41.9 ANXIETY DISORDER, UNSPECIFIED: ICD-10-CM

## 2022-11-24 DIAGNOSIS — J10.1 INFLUENZA DUE TO OTHER IDENTIFIED INFLUENZA VIRUS WITH OTHER RESPIRATORY MANIFESTATIONS: ICD-10-CM

## 2022-11-24 DIAGNOSIS — Z94.7 CORNEAL TRANSPLANT STATUS: Chronic | ICD-10-CM

## 2022-11-24 DIAGNOSIS — Z88.0 ALLERGY STATUS TO PENICILLIN: ICD-10-CM

## 2022-11-24 LAB
FLUAV AG NPH QL: DETECTED
FLUBV AG NPH QL: SIGNIFICANT CHANGE UP
RSV RNA NPH QL NAA+NON-PROBE: SIGNIFICANT CHANGE UP
SARS-COV-2 RNA SPEC QL NAA+PROBE: SIGNIFICANT CHANGE UP

## 2022-11-24 PROCEDURE — 71046 X-RAY EXAM CHEST 2 VIEWS: CPT | Mod: 26

## 2022-11-24 PROCEDURE — 99284 EMERGENCY DEPT VISIT MOD MDM: CPT

## 2022-11-24 RX ORDER — ACETAMINOPHEN 500 MG
650 TABLET ORAL ONCE
Refills: 0 | Status: COMPLETED | OUTPATIENT
Start: 2022-11-24 | End: 2022-11-24

## 2022-11-24 RX ADMIN — Medication 650 MILLIGRAM(S): at 04:57

## 2022-11-24 NOTE — ED PROVIDER NOTE - PHYSICAL EXAMINATION
CONSTITUTIONAL: Well-appearing; in no apparent distress.   ENT: Hearing is intact with good acuity to spoken voice.  Patient is speaking clearly, not muffled and airway is intact.   RESPIRATORY: No signs of respiratory distress. Lung sounds are clear in all lobes bilaterally without rales, rhonchi, or wheezes.  CARDIOVASCULAR: Regular rate and rhythm.   NEURO: A & O x 3. Normal speech. No focal deficit.  PSYCHOLOGICAL: Appropriate mood and affect. Good judgement and insight.

## 2022-11-24 NOTE — ED PROVIDER NOTE - ATTENDING APP SHARED VISIT CONTRIBUTION OF CARE
Patient is c/o nasal congestion, cough, fever, body aches x 3 days. Patient denies cp/sob/abd pain. Denies travel. No rash.   Vitals reviewed.   Patient is awake, alert, answering questions appropriately, appears comfortable and not in any distress.  Lungs: CTA, no wheezing, no crackles.  Abd: +BS, NT, ND, soft, no rebound, no guarding. No CVA tenderness, no rash.  CNS: awake, alert, o x 3, no focal neurologic deficits.  NO meningeal signs noted.   A/P: URI,   Covid test,   reevaluation.

## 2022-11-24 NOTE — ED PROVIDER NOTE - PATIENT PORTAL LINK FT
You can access the FollowMyHealth Patient Portal offered by [facility] by registering at the following website: [email or web address]. By joining [employer]’s FollowMyHealth portal, you will also be able to view your health information using other applications (apps) compatible with our system.

## 2022-11-24 NOTE — ED PROVIDER NOTE - OBJECTIVE STATEMENT
31-year-old male with past medical history of asthma, depression, anxiety, and ADHD who presents with cough, fever, and general body ache.  Reports that symptom started few days ago and he has tried over-the-counter medications, but symptoms did not improve.  Patient wants to get tested for flu and COVID. Denies shortness of breath, chest pain, nausea, vomiting, any urinary symptoms.

## 2022-11-24 NOTE — ED PROVIDER NOTE - NS ED ROS FT
Constitutional: + general body ache and fever. Negative for chills, and fatigue.  HENT: Negative for headache, hearing change, ear pain, nasal congestion, and sore throat.  Cardiovascular: Negative for chest pain, and palpitation.  Respiratory: + cough. Negative for SOB, wheezing, and sputum production.  Gastrointestinal: Negative for nausea, vomiting, abdominal pain  Genitourinary: Negative for flank pain, dysuria, frequency, and hematuria.  Neurological: Negative for dizziness, syncope, and loss of consciousness.

## 2022-11-24 NOTE — ED ADULT NURSE NOTE - CHIEF COMPLAINT QUOTE
pt sts for the past 3 days  I feel like I have a fever. I have checked it but I feel warm and I am coughing up mucus

## 2022-11-24 NOTE — ED PROVIDER NOTE - NS ED ATTENDING STATEMENT MOD
This was a shared visit with the MAISHA. I reviewed and verified the documentation and independently performed the documented:

## 2022-11-24 NOTE — ED PROVIDER NOTE - NSFOLLOWUPINSTRUCTIONS_ED_ALL_ED_FT
Please make sure to follow up with your primary care doctor in 3 days.    You can receive your COVID result by registering on the- FollowMyHealth gerardo. You can also call [phone] for results.      Acute Cough    AMBULATORY CARE:    An acute cough can last up to 3 weeks. Common causes of an acute cough include a cold, allergies, or a lung infection.     Seek care immediately if:   -You have trouble breathing or feel short of breath.  -You cough up blood, or you see blood in your mucus.   -You faint or feel weak or dizzy.   -You have chest pain when you cough or take a deep breath.   -You have new wheezing.     Contact your healthcare provider if:   -You have a fever.   -Your cough lasts longer than 4 weeks.   -Your symptoms do not improve with treatment.   -You have questions or concerns about your condition or care.     Treatment: An acute cough usually goes away on its own. Ask your healthcare provider about medicines you can take to decrease your cough. You may need medicine to stop the cough, decrease swelling in your airways, or help open your airways. Medicine may also be given to help you cough up mucus. If you have an infection caused by bacteria, you may need antibiotics.     Manage your symptoms:     Do not smoke and stay away from others who smoke. Nicotine and other chemicals in cigarettes and cigars can cause lung damage and make your cough worse. Ask your healthcare provider for information if you currently smoke and need help to quit. E-cigarettes or smokeless tobacco still contain nicotine. Talk to your healthcare provider before you use these products.       Drink extra liquids as directed. Liquids will help thin and loosen mucus so you can cough it up. Liquids will also help prevent dehydration. Examples of good liquids to drink include water, fruit juice, and broth. Do not drink liquids that contain caffeine. Caffeine can increase your risk for dehydration. Ask your healthcare provider how much liquid to drink each day.       Rest as directed. Do not do activities that make your cough worse, such as exercise.       Use a humidifier or vaporizer. Use a cool mist humidifier or a vaporizer to increase air moisture in your home. This may make it easier for you to breathe and help decrease your cough.       Eat 2 to 5 mL of honey 2 times each day. Honey can help thin mucus and decrease your cough.       Use cough drops or lozenges. These can help decrease throat irritation and your cough.     Follow up with your healthcare provider as directed: Write down your questions so you remember to ask them during your visits.

## 2022-11-29 NOTE — ED ADULT NURSE NOTE - SUICIDE SCREENING QUESTION 2
Called and spoke with patient, c/o restless legs syndrome, taking gabapentin 300 mg bid, will increase to 400 mg at bedtime.  Uses visine eye drops. Will try warm compresses for tearing eyes.  Azelastine eye drops not helping.  They will send another message if not better.   No

## 2022-12-28 NOTE — ED PROVIDER NOTE - DISPOSITION TYPE
Cardiology Progress Note   Nely Mercer 76 y o  female MRN: [id number]    Unit/Bed#: -01 Encounter: [id number]      Assessment:  1  Acute hypoxic respiratory failure  • Initially on BiPAP weaned to O2 via nasal cannula  • Overnight 12/26/2022, patient had increased work of breathing NC increased to 12L but down titrated down to 2L   • Secondary to acute CHF exacerbation/plueral effusions  2  Acute on chronic combined CHF, EF decline 40->30%  • Previously on PO lasix 20mg QD; on IV Lasix 40 mg BID as inpatient  • She is net -6 pounds and -5 5L since admission  • TTE 8/20/2021 EF 55%, no RWMAs, G2DD, moderate LAE, moderate MR, mild AR, moderate TR, mild to moderate NE, 3 8cm aortic root dilation  • TTE 7/5/2022 EF 40%, moderate global hypokinesis, severe LA dilation, severe MR, moderate left pleural effusion  • TTE 12/26/22 EF 30%, severe global hypokinesis with regional variation, G2DD, LA massively dilated, anterior and posterior MV leaflets are tethered, there is severe likely functional regurgitation, mild TR, PASP 50 mmHg, 3 8 ascending aortic aneurysm, trivial pericardial effusion, left pleural effusion   • Toprol XL increased 25>50mg QD 12/27/22  • Entresto 24-26mg BID added 12/28/22  3  Severe functional MR  • Moderate -> severe MR, probably functional MR as per last TTE  • Conservative management   4  Pleural effusions  • Likely secondary to #2 and #3  • Repeat CXR pending for today, IR consult d/c'd  5   Elevated troponins  • Hs-troponin 52>67>89  • Likely non-MI elevated secondary to acute CHF/hypoxia   6  Paroxysmal atrial fibrillation  • Appears SR currently with HR 60-80s on telemetry  • Toprol XL increased to 50mg QD  • Not on anticoagulation due to history of bleeding and AVMs needing multiple transfusions  7   Chronic anemia/GIST  • She has received multiple blood transfusions over the last year and follows at [facility] for chemo/immunotherapy     Plan/discussion:   · Continue with IV DISCHARGE 05:30 lasix 40mg BID -- repeat CXR pending to re-assess for improving pulmonary edema/ pleural effusions  IR consult for thoracentesis on hold for now  · Continue with Toprol XL 50mg QD  · Plan to optimize GDMT -- Entresto added today and tolerating well, consider adding SGLT2 inhibitor at discharge   · Discussed with patient possibility of primary prevention ICD given her EF now <35% to prevent SCD -- she is weighing her options considering her oncological issues and wants more time to think about this  Subjective:   Patient seen and examined  Overnight events reviewed  Appears comfortable today  Less short of breath today  No chest pain or discomfort  Objective:     Vitals: Blood pressure 137/59, pulse 96, temperature 97 6 °F (36 4 °C), temperature source Oral, resp  rate 20, height 5' 1" (1 549 m), weight 47 9 kg (105 lb 9 6 oz), SpO2 95 %  , Body mass index is 19 95 kg/m² ,   Orthostatic Blood Pressures    Flowsheet Row Most Recent Value   Blood Pressure 137/59 filed at 12/28/2022 0800   Patient Position - Orthostatic VS Lying filed at 12/28/2022 0800            Intake/Output Summary (Last 24 hours) at 12/28/2022 1134  Last data filed at 12/28/2022 0801  Gross per 24 hour   Intake 710 ml   Output 1200 ml   Net -490 ml         Physical Exam:    GEN: Julitocia Masker appears well, alert and oriented x 3, pleasant and cooperative   HEENT: Sclera anicteric, conjunctivae pink, mucous membranes moist  Oropharynx clear  NECK: supple, no significant JVD, Trachea midline, no thyromegaly  HEART: regular rhythm, normal S1 and S2, no murmurs, clicks, gallops or rubs   LUNGS: clear to auscultation bilaterally; no wheezes, rales, or rhonchi  No increased work of breathing or signs of respiratory distress  ABDOMEN: Soft, nontender, nondistended  EXTREMITIES: Skin warm and well perfused, no clubbing, cyanosis, or edema  NEURO: No focal findings  Normal speech   Mood and affect normal    SKIN: Normal without suspicious lesions on exposed skin        Medications:      Current Facility-Administered Medications:   •  aspirin (ECOTRIN LOW STRENGTH) EC tablet 81 mg, 81 mg, Oral, Daily, Avni De Dios PA-C, 81 mg at 12/28/22 5537  •  bisacodyl (DULCOLAX) rectal suppository 10 mg, 10 mg, Rectal, Daily PRN, Virginia Parker MD, 10 mg at 12/26/22 1659  •  cefTRIAXone (ROCEPHIN) IVPB (premix in dextrose) 1,000 mg 50 mL, 1,000 mg, Intravenous, Q24H, Avni De Dios PA-C, Last Rate: 100 mL/hr at 12/27/22 2050, 1,000 mg at 12/27/22 2050  •  diphenoxylate-atropine (LOMOTIL) 2 5-0 025 mg per tablet 1 tablet, 1 tablet, Oral, 4x Daily PRN, DONA RonquilloC  •  docusate sodium (COLACE) capsule 100 mg, 100 mg, Oral, BID PRN, Avni De Dios PA-C  •  furosemide (LASIX) injection 40 mg, 40 mg, Intravenous, BID (diuretic), Tamela Otero MD, 40 mg at 12/28/22 0803  •  heparin (porcine) subcutaneous injection 5,000 Units, 5,000 Units, Subcutaneous, Q8H [address], DOUG Ronquillo-YUE, 5,000 Units at 12/28/22 4775  •  hydroxychloroquine (PLAQUENIL) tablet 200 mg, 200 mg, Oral, Daily With Breakfast, Avni De Dios PA-C, 200 mg at 12/28/22 8781  •  levalbuterol [facility]) inhalation solution 1 25 mg, 1 25 mg, Nebulization, Q6H, DOUG Ronquillo-C, 1 25 mg at 12/28/22 7704  •  metoprolol (LOPRESSOR) injection 5 mg, 5 mg, Intravenous, Q6H PRN, LANEY Gonzales  •  metoprolol succinate (TOPROL-XL) 24 hr tablet 50 mg, 50 mg, Oral, BID, Lj Ferreira PA-C, 50 mg at 12/28/22 0849  •  ondansetron [facility]) injection 4 mg, 4 mg, Intravenous, Once, Avni De Dios PA-C  •  pantoprazole (PROTONIX) EC tablet 40 mg, 40 mg, Oral, Daily Before Breakfast, Virginia Parker MD, 40 mg at 12/28/22 2722  •  polyethylene glycol (MIRALAX) packet 17 g, 17 g, Oral, Daily, Ashley Frankel Fountaine, PA-C  •  potassium chloride (K-DUR,KLOR-CON) CR tablet 40 mEq, 40 mEq, Oral, Daily, Vini Campos PA-C, 40 mEq at 12/28/22 6822  •  sacubitril-valsartan (ENTRESTO) 24-26 MG per tablet 1 tablet, 1 tablet, Oral, BID, Malgorzata Nascimento PA-C, 1 tablet at 12/28/22 2156  •  senna (SENOKOT) tablet 17 2 mg, 2 tablet, Oral, HS, Sherryle Abu Fountaine, PA-C, 17 2 mg at 12/26/22 2117  •  sodium chloride 0 9 % inhalation solution 3 mL, 3 mL, Nebulization, Q6H, Rosalee Neves MD, 3 mL at 12/28/22 1289  •  traMADol (ULTRAM) tablet 50 mg, 50 mg, Oral, Q6H PRN, Alice Hopper PA-C  •  trimethobenzamide [facility]) IM injection 200 mg, 200 mg, Intramuscular, Q6H PRN, Fadia Jasso PA-C, 200 mg at 12/27/22 0053     Labs & Results:    Results from last 7 days   Lab Units 12/24/22 1926   CK TOTAL U/L 373*   CK MB INDEX % 1 8     Results from last 7 days   Lab Units 12/28/22  0603 12/27/22  0508 12/26/22  0423   WBC Thousand/uL 4 37 5 89 7 63   HEMOGLOBIN g/dL 8 1* 8 2* 7 9*   HEMATOCRIT % 24 7* 24 8* 23 8*   PLATELETS Thousands/uL 209 206 215         Results from last 7 days   Lab Units 12/28/22  0603 12/27/22  0508 12/26/22  0423 12/25/22  1513 12/25/22  0424 12/24/22  1929 12/24/22  1926   0000   POTASSIUM mmol/L 3 9 3 5 3 5  --    < >  --  3 6  --    CHLORIDE mmol/L 95* 93* 95*  --    < >  --  97  --    CO2 mmol/L 26 27 26  --    < >  --  19*  --    CO2, I-STAT mmol/L  --   --   --  25  --  17*  --    < >   BUN mg/dL 35* 31* 24  --    < >  --  16  --    CREATININE mg/dL 1 12 1 18 1 18  --    < >  --  1 29  --    CALCIUM mg/dL 8 6 9 0 9 4  --    < >  --  10 1  --    ALK PHOS U/L  --   --   --   --   --   --  92  --    ALT U/L  --   --   --   --   --   --  29  --    AST U/L  --   --   --   --   --   --  50*  --    GLUCOSE, ISTAT mg/dl  --   --   --  115  --  171*  --   --     < > = values in this interval not displayed       Results from last 7 days   Lab Units 12/24/22 2010   INR  1 07   PTT seconds 26     Results from last 7 days   Lab Units 12/25/22  0424   MAGNESIUM mg/dL 1 8

## 2023-04-24 NOTE — ED PROVIDER NOTE - NSICDXPASTMEDICALHX_GEN_ALL_CORE_FT
PAST MEDICAL HISTORY:  ADHD     Alcohol abuse     Anxiety     Anxiety     Asthma     Depression     
(4) no impairment

## 2023-06-14 NOTE — ED PROVIDER NOTE - QUALITY
no pain Isotretinoin Pregnancy And Lactation Text: This medication is Pregnancy Category X and is considered extremely dangerous during pregnancy. It is unknown if it is excreted in breast milk.

## 2023-12-04 ENCOUNTER — EMERGENCY (EMERGENCY)
Facility: HOSPITAL | Age: 33
LOS: 0 days | Discharge: HOME | End: 2023-12-04
Attending: STUDENT IN AN ORGANIZED HEALTH CARE EDUCATION/TRAINING PROGRAM
Payer: MEDICAID

## 2023-12-04 VITALS
HEIGHT: 68 IN | RESPIRATION RATE: 18 BRPM | DIASTOLIC BLOOD PRESSURE: 101 MMHG | WEIGHT: 145.06 LBS | SYSTOLIC BLOOD PRESSURE: 169 MMHG | OXYGEN SATURATION: 97 % | TEMPERATURE: 99 F | HEART RATE: 108 BPM

## 2023-12-04 DIAGNOSIS — R45.851 SUICIDAL IDEATIONS: ICD-10-CM

## 2023-12-04 DIAGNOSIS — F12.20 CANNABIS DEPENDENCE, UNCOMPLICATED: ICD-10-CM

## 2023-12-04 DIAGNOSIS — F41.9 ANXIETY DISORDER, UNSPECIFIED: ICD-10-CM

## 2023-12-04 DIAGNOSIS — Z94.7 CORNEAL TRANSPLANT STATUS: Chronic | ICD-10-CM

## 2023-12-04 DIAGNOSIS — Z20.822 CONTACT WITH AND (SUSPECTED) EXPOSURE TO COVID-19: ICD-10-CM

## 2023-12-04 DIAGNOSIS — F32.A DEPRESSION, UNSPECIFIED: ICD-10-CM

## 2023-12-04 LAB
ANION GAP SERPL CALC-SCNC: 22 MMOL/L — HIGH (ref 7–14)
ANION GAP SERPL CALC-SCNC: 22 MMOL/L — HIGH (ref 7–14)
APAP SERPL-MCNC: <5 UG/ML — LOW (ref 10–30)
APAP SERPL-MCNC: <5 UG/ML — LOW (ref 10–30)
BASOPHILS # BLD AUTO: 0 K/UL — SIGNIFICANT CHANGE UP (ref 0–0.2)
BASOPHILS # BLD AUTO: 0 K/UL — SIGNIFICANT CHANGE UP (ref 0–0.2)
BASOPHILS NFR BLD AUTO: 0 % — SIGNIFICANT CHANGE UP (ref 0–1)
BASOPHILS NFR BLD AUTO: 0 % — SIGNIFICANT CHANGE UP (ref 0–1)
BUN SERPL-MCNC: 10 MG/DL — SIGNIFICANT CHANGE UP (ref 10–20)
BUN SERPL-MCNC: 10 MG/DL — SIGNIFICANT CHANGE UP (ref 10–20)
CALCIUM SERPL-MCNC: 10.4 MG/DL — SIGNIFICANT CHANGE UP (ref 8.4–10.5)
CALCIUM SERPL-MCNC: 10.4 MG/DL — SIGNIFICANT CHANGE UP (ref 8.4–10.5)
CHLORIDE SERPL-SCNC: 99 MMOL/L — SIGNIFICANT CHANGE UP (ref 98–110)
CHLORIDE SERPL-SCNC: 99 MMOL/L — SIGNIFICANT CHANGE UP (ref 98–110)
CO2 SERPL-SCNC: 19 MMOL/L — SIGNIFICANT CHANGE UP (ref 17–32)
CO2 SERPL-SCNC: 19 MMOL/L — SIGNIFICANT CHANGE UP (ref 17–32)
CREAT SERPL-MCNC: 1 MG/DL — SIGNIFICANT CHANGE UP (ref 0.7–1.5)
CREAT SERPL-MCNC: 1 MG/DL — SIGNIFICANT CHANGE UP (ref 0.7–1.5)
EGFR: 103 ML/MIN/1.73M2 — SIGNIFICANT CHANGE UP
EGFR: 103 ML/MIN/1.73M2 — SIGNIFICANT CHANGE UP
EOSINOPHIL # BLD AUTO: 0.2 K/UL — SIGNIFICANT CHANGE UP (ref 0–0.7)
EOSINOPHIL # BLD AUTO: 0.2 K/UL — SIGNIFICANT CHANGE UP (ref 0–0.7)
EOSINOPHIL NFR BLD AUTO: 1 % — SIGNIFICANT CHANGE UP (ref 0–8)
EOSINOPHIL NFR BLD AUTO: 1 % — SIGNIFICANT CHANGE UP (ref 0–8)
ETHANOL SERPL-MCNC: <10 MG/DL — SIGNIFICANT CHANGE UP
ETHANOL SERPL-MCNC: <10 MG/DL — SIGNIFICANT CHANGE UP
GLUCOSE SERPL-MCNC: 129 MG/DL — HIGH (ref 70–99)
GLUCOSE SERPL-MCNC: 129 MG/DL — HIGH (ref 70–99)
HCT VFR BLD CALC: 49.2 % — SIGNIFICANT CHANGE UP (ref 42–52)
HCT VFR BLD CALC: 49.2 % — SIGNIFICANT CHANGE UP (ref 42–52)
HGB BLD-MCNC: 17.8 G/DL — SIGNIFICANT CHANGE UP (ref 14–18)
HGB BLD-MCNC: 17.8 G/DL — SIGNIFICANT CHANGE UP (ref 14–18)
LYMPHOCYTES # BLD AUTO: 1.19 K/UL — LOW (ref 1.2–3.4)
LYMPHOCYTES # BLD AUTO: 1.19 K/UL — LOW (ref 1.2–3.4)
LYMPHOCYTES # BLD AUTO: 6 % — LOW (ref 20.5–51.1)
LYMPHOCYTES # BLD AUTO: 6 % — LOW (ref 20.5–51.1)
MANUAL SMEAR VERIFICATION: SIGNIFICANT CHANGE UP
MANUAL SMEAR VERIFICATION: SIGNIFICANT CHANGE UP
MCHC RBC-ENTMCNC: 31.6 PG — HIGH (ref 27–31)
MCHC RBC-ENTMCNC: 31.6 PG — HIGH (ref 27–31)
MCHC RBC-ENTMCNC: 36.2 G/DL — SIGNIFICANT CHANGE UP (ref 32–37)
MCHC RBC-ENTMCNC: 36.2 G/DL — SIGNIFICANT CHANGE UP (ref 32–37)
MCV RBC AUTO: 87.2 FL — SIGNIFICANT CHANGE UP (ref 80–94)
MCV RBC AUTO: 87.2 FL — SIGNIFICANT CHANGE UP (ref 80–94)
MONOCYTES # BLD AUTO: 1.79 K/UL — HIGH (ref 0.1–0.6)
MONOCYTES # BLD AUTO: 1.79 K/UL — HIGH (ref 0.1–0.6)
MONOCYTES NFR BLD AUTO: 9 % — SIGNIFICANT CHANGE UP (ref 1.7–9.3)
MONOCYTES NFR BLD AUTO: 9 % — SIGNIFICANT CHANGE UP (ref 1.7–9.3)
NEUTROPHILS # BLD AUTO: 16.09 K/UL — HIGH (ref 1.4–6.5)
NEUTROPHILS # BLD AUTO: 16.09 K/UL — HIGH (ref 1.4–6.5)
NEUTROPHILS NFR BLD AUTO: 81 % — HIGH (ref 42.2–75.2)
NEUTROPHILS NFR BLD AUTO: 81 % — HIGH (ref 42.2–75.2)
NRBC # BLD: 0 /100 WBCS — SIGNIFICANT CHANGE UP (ref 0–0)
NRBC # BLD: 0 /100 WBCS — SIGNIFICANT CHANGE UP (ref 0–0)
NRBC # BLD: SIGNIFICANT CHANGE UP /100 WBCS (ref 0–0)
NRBC # BLD: SIGNIFICANT CHANGE UP /100 WBCS (ref 0–0)
OVALOCYTES BLD QL SMEAR: SLIGHT — SIGNIFICANT CHANGE UP
OVALOCYTES BLD QL SMEAR: SLIGHT — SIGNIFICANT CHANGE UP
PLAT MORPH BLD: NORMAL — SIGNIFICANT CHANGE UP
PLAT MORPH BLD: NORMAL — SIGNIFICANT CHANGE UP
PLATELET # BLD AUTO: 450 K/UL — HIGH (ref 130–400)
PLATELET # BLD AUTO: 450 K/UL — HIGH (ref 130–400)
PMV BLD: 9.6 FL — SIGNIFICANT CHANGE UP (ref 7.4–10.4)
PMV BLD: 9.6 FL — SIGNIFICANT CHANGE UP (ref 7.4–10.4)
POTASSIUM SERPL-MCNC: 3.6 MMOL/L — SIGNIFICANT CHANGE UP (ref 3.5–5)
POTASSIUM SERPL-MCNC: 3.6 MMOL/L — SIGNIFICANT CHANGE UP (ref 3.5–5)
POTASSIUM SERPL-SCNC: 3.6 MMOL/L — SIGNIFICANT CHANGE UP (ref 3.5–5)
POTASSIUM SERPL-SCNC: 3.6 MMOL/L — SIGNIFICANT CHANGE UP (ref 3.5–5)
RBC # BLD: 5.64 M/UL — SIGNIFICANT CHANGE UP (ref 4.7–6.1)
RBC # BLD: 5.64 M/UL — SIGNIFICANT CHANGE UP (ref 4.7–6.1)
RBC # FLD: 11.9 % — SIGNIFICANT CHANGE UP (ref 11.5–14.5)
RBC # FLD: 11.9 % — SIGNIFICANT CHANGE UP (ref 11.5–14.5)
RBC BLD AUTO: NORMAL — SIGNIFICANT CHANGE UP
RBC BLD AUTO: NORMAL — SIGNIFICANT CHANGE UP
SALICYLATES SERPL-MCNC: <0.3 MG/DL — LOW (ref 4–30)
SALICYLATES SERPL-MCNC: <0.3 MG/DL — LOW (ref 4–30)
SARS-COV-2 RNA SPEC QL NAA+PROBE: SIGNIFICANT CHANGE UP
SARS-COV-2 RNA SPEC QL NAA+PROBE: SIGNIFICANT CHANGE UP
SODIUM SERPL-SCNC: 140 MMOL/L — SIGNIFICANT CHANGE UP (ref 135–146)
SODIUM SERPL-SCNC: 140 MMOL/L — SIGNIFICANT CHANGE UP (ref 135–146)
VARIANT LYMPHS # BLD: 3 % — SIGNIFICANT CHANGE UP (ref 0–5)
VARIANT LYMPHS # BLD: 3 % — SIGNIFICANT CHANGE UP (ref 0–5)
WBC # BLD: 19.86 K/UL — HIGH (ref 4.8–10.8)
WBC # BLD: 19.86 K/UL — HIGH (ref 4.8–10.8)
WBC # FLD AUTO: 19.86 K/UL — HIGH (ref 4.8–10.8)
WBC # FLD AUTO: 19.86 K/UL — HIGH (ref 4.8–10.8)

## 2023-12-04 PROCEDURE — 85025 COMPLETE CBC W/AUTO DIFF WBC: CPT

## 2023-12-04 PROCEDURE — 93005 ELECTROCARDIOGRAM TRACING: CPT

## 2023-12-04 PROCEDURE — 90792 PSYCH DIAG EVAL W/MED SRVCS: CPT | Mod: 95

## 2023-12-04 PROCEDURE — 80048 BASIC METABOLIC PNL TOTAL CA: CPT

## 2023-12-04 PROCEDURE — 99285 EMERGENCY DEPT VISIT HI MDM: CPT | Mod: 25

## 2023-12-04 PROCEDURE — 93010 ELECTROCARDIOGRAM REPORT: CPT

## 2023-12-04 PROCEDURE — 36415 COLL VENOUS BLD VENIPUNCTURE: CPT

## 2023-12-04 PROCEDURE — 80307 DRUG TEST PRSMV CHEM ANLYZR: CPT

## 2023-12-04 PROCEDURE — 99285 EMERGENCY DEPT VISIT HI MDM: CPT

## 2023-12-04 PROCEDURE — 87635 SARS-COV-2 COVID-19 AMP PRB: CPT

## 2023-12-04 RX ORDER — ACETAMINOPHEN 500 MG
650 TABLET ORAL ONCE
Refills: 0 | Status: COMPLETED | OUTPATIENT
Start: 2023-12-04 | End: 2023-12-04

## 2023-12-04 RX ADMIN — Medication 650 MILLIGRAM(S): at 12:33

## 2023-12-04 NOTE — ED BEHAVIORAL HEALTH ASSESSMENT NOTE - DESCRIPTION
as in HPI see [facility] note    istop report: [id number]  A	N	Y	B	11/22/2023	11/22/2023	clonazepam 0.5 mg tablet	14	14	Azeem Presley	[id number]	Medicaid	[id number]  A	N	N	B	08/30/2023	09/02/2023	clonazepam 0.5 mg tablet	14	14	Azeem Presley	[id number]	Medicaid	[id number]  A	N	N	B	08/16/2023	08/18/2023	clonazepam 0.5 mg tablet	14	14	Azeem Presley	[id number]	Medicaid	[id number] see [facility] note    istop report: [id number]  A	N	Y	B	11/22/2023	11/22/2023	clonazepam 0.5 mg tablet	14	14	Azeem Presley	[id number]	Medicaid	[id number]  A	N	N	B	08/30/2023	09/02/2023	clonazepam 0.5 mg tablet	14	14	Azeem Presley	[id number]	Medicaid	[id number]  A	N	N	B	08/16/2023	08/18/2023	clonazepam 0.5 mg tablet	14	14	Azeem Presley	[id number]	Medicaid	[id number] denies

## 2023-12-04 NOTE — ED BEHAVIORAL HEALTH ASSESSMENT NOTE - OTHER
family member [facility] concrete

## 2023-12-04 NOTE — ED BEHAVIORAL HEALTH NOTE - BEHAVIORAL HEALTH NOTE
===================    PRE-HOSPITAL COURSE    ==================    SOURCE:  ED provider and ED documentation     DETAILS:  Pt BiBparents for recent SH and worsening SI with AH     ============    ED COURSE    ============    SOURCE: ED provider and secondhand ED documentation.    ARRIVAL: Per ED documentation patient bibEMS to ED. Patient cooperative with triage process.     BELONGINGS: Unknown   BEHAVIOR: ED provider described patient to be slightly agitated, remains in behavioral and impulse control, and is not in restraints. Pt currently has 1:1 sitter.  Pt is not displaying aggression towards staff or others and was described as cooperative. Per provider, pt presenting with depressed affect and mood is congruent with affect. Provider stated that the patient is endorsing current SI but no mention of HI. Per provider pt is endorsing AH.  There are healed scars on his knuckles. Provider reports that the patient appears to have fair hygiene.   TREATMENT: No medication administered. No restraints.     VISITORS: None     -----------------------------------------------   COVID Exposure Screen- collateral (i.e. third-party, chart review, belongings, etc; include EMS and ED staff)   ---------------------------------------------------   1. Has the patient had a COVID-19 test in the last 90 days? Unknown.   2. Has the patient tested positive for COVID-19 antibodies? Unknown.   3.Has the patient received 2 doses of the COVID-19 vaccine?  Unknown.   4. In the past 10 days, has the patient been around anyone with a positive COVID-19 test?* Unknown.   5.Has the patient been out of [place] within the past 10 days? Unknown.

## 2023-12-04 NOTE — ED BEHAVIORAL HEALTH ASSESSMENT NOTE - HPI (INCLUDE ILLNESS QUALITY, SEVERITY, DURATION, TIMING, CONTEXT, MODIFYING FACTORS, ASSOCIATED SIGNS AND SYMPTOMS)
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     Pt reported he was feeling better and immediately asked for 'my discharge papers', was vague regarding reason for ED visit, was limited/concrete historian, oddly related, approached device very closely. He reported he lives with his parents and gets along with them. He reports that 'talking to people' in the ED helped him feel better, reports that before presenting he was 'getting teased a lot' by 'people on the street' however is unable to provide details. He denied SI/HI/AVH/PI, denied sleep/mood/appetite changes.     Collateral: attempted to call pt's mother Niki , number disconnected  Spoke to pt's father Mariano  reported that 'pt smokes weed and occasionally the wrong one, the oil, and when he smokes that it affects him, he gets violent' which he reported last happened one month ago. Reported he did so again 2 days ago, and then he 'looked different' and reported he acted differently in that when he said something to pt, pt reacted differently, and that he was going for walks at night which is unusual for him. He denied that pt had been a danger to himself or anyone else. 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     Pt reported he was feeling better and immediately asked for 'my discharge papers', was vague regarding reason for ED visit, was limited/concrete historian, oddly related, approached device very closely. He reported he lives with his parents and gets along with them. He reports that 'talking to people' in the ED helped him feel better, reports that before presenting he was 'getting teased a lot' by 'people on the street' however is unable to provide details. He denied SI/HI/AVH/PI, denied sleep/mood/appetite changes.     Collateral: attempted to call pt's mother Niki , number disconnected  Spoke to pt's father Mariano  reported that 'pt smokes weed and occasionally the wrong one, the oil, and when he smokes that it affects him, he gets violent' which he reported last happened one month ago. Reported he did so again 2 days ago, and then he 'looked different' and reported he acted differently in that when he said something to pt, pt reacted differently, and that he was going for walks at night which is unusual for him. He denied that pt had been a danger to himself or anyone else. He requested that pt be admitted 'upstairs for a week.'

## 2023-12-04 NOTE — ED PROVIDER NOTE - PROGRESS NOTE DETAILS
pt awake; ambulating w/o diff; asking for dc papers; a&ox4; no si or hi; endorses smoking MJ. pt cleared by psych. The patient / caregiver given detailed return precautions and advised to return to the emergency department if any new symptoms developed, symptoms worsened or for any concerns. The patient / caregiver offered the opportunity to ask questions and verbalized that they understand the diagnosis and discharge instructions.

## 2023-12-04 NOTE — ED PROVIDER NOTE - NSFOLLOWUPCLINICS_GEN_ALL_ED_FT
[facility]  OP Mental Health  [address] [place] [id number]  Phone: [phone]  Fax:      [facility]  OP Mental Health  [address] [place] [address]  Phone: [phone]  Fax:
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
## 2023-12-04 NOTE — ED BEHAVIORAL HEALTH ASSESSMENT NOTE - RISK ASSESSMENT
af none identified  cf substance use, male, single  pf supportive family, engaged in tx, no known hx harm to self   overall low acute risk of harm to self
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
## 2023-12-04 NOTE — ED PROVIDER NOTE - OBJECTIVE STATEMENT
32 y M BIBEMS for being found outside, obtunded, protecting airway. charted hx depression, hx of alcohol and cannabis use disorders), hx of admission (reports last approx 6 months ago in [place] however does not appear to be reliable historian, per records last known admission in 2021), reports has current outpt tx w therapist Leilani and rxed clonazepam, reports MJ use of 1g daily, no known medical hx. initial report of suicidal ideation w/o plan. no physical complaints; no hi. 32 y M BIBEMS for being found outside, obtunded, protecting airway. charted hx depression, hx of alcohol and cannabis use disorders), hx of admission (reports last approx 6 months ago in [place] however does not appear to be reliable historian, per records last known admission in 2021), reports has current outpt tx w therapist Leilani and rxed clonazepam, reports MJ use of 1g daily, no known medical hx. initial report of suicidal ideation w/o plan. no physical complaints; no hi.

## 2023-12-04 NOTE — ED PROVIDER NOTE - CLINICAL SUMMARY MEDICAL DECISION MAKING FREE TEXT BOX
psych for suicidal ideation, evaluated by telepsych, + marijuana use, will DC.    Well appearing, NAD, non toxic. NCAT PERRLA EOMI neck supple non tender normal wob cta bl rrr abdomen s nt nd no rebound no guarding WWPx4 neuro non focal    Labs and EKG were ordered and reviewed.  Imaging was ordered and reviewed by me.  Appropriate medications for patient's presenting complaints were ordered and effects were reassessed.  Patient's records (prior hospital, ED visit, and/or nursing home notes if available) were reviewed.  Additional history was obtained from EMS, family, and/or PCP (where available).  Escalation to admission/observation was considered. However patient feels much better and is comfortable with discharge.  Appropriate follow-up was arranged.     telepsych cleared will f/u outpatient mental health

## 2023-12-04 NOTE — ED BEHAVIORAL HEALTH ASSESSMENT NOTE - NSBHATTESTBILLING_PSY_A_CORE
[id number] diagnostic evaluation with medical services [id number]-Yrxadyodqcp diagnostic evaluation with medical services

## 2023-12-04 NOTE — ED PROVIDER NOTE - PHYSICAL EXAMINATION
Constitutional: Well developed, well nourished. NAD, disheveled   TRAUMA: ABC intact. GCS 14; arousable; speaking clearly when awake;   Head: Normocephalic, atraumatic.  Eyes: PERRL. EOMI. No Raccoon eyes.   ENT: No nasal discharge. No septal hematoma. No Jimenez sign. Mucous membranes moist.  Neck: Supple. Painless ROM. No midline tenderness, stepoffs.  Cardiovascular: Normal S1, S2. Regular rate and rhythm. No murmurs, rubs, or gallops.  Pulmonary: Normal respiratory rate and effort. Lungs clear to auscultation bilaterally. No wheezing, rales, or rhonchi.  CHEST: No chest wall tenderness, crepitus.  Abdominal: Soft. Nondistended. Nontender. No rebound, guarding, rigidity.  BACK: No midline T/L/S tenderness, stepoffs. No saddle paresthesia.  Extremities. Pelvis stable. No traumatic deformities, tenderness of extremities.  Skin: No rashes, cyanosis, lacerations, abrasions.  Neuro: AAOx3; tired. Strength 5/5 in all extremities. Sensation intact throughout. No focal neurological deficits.  Psych: endorses si w/o plan; no hi

## 2023-12-04 NOTE — ED PROVIDER NOTE - PATIENT PORTAL LINK FT
You can access the FollowMyHealth Patient Portal offered by [facility] by registering at the following website: [email or web address]. By joining [employer]’s FollowMyHealth portal, you will also be able to view your health information using other applications (apps) compatible with our system. You can access the FollowMyHealth Patient Portal offered by [facility] by registering at the following website: [email or web address]. By joining [employer]’s FollowMyHealth portal, you will also be able to view your health information using other applications (apps) compatible with our system.

## 2023-12-04 NOTE — ED ADULT NURSE NOTE - NSFALLUNIVINTERV_ED_ALL_ED
Bed/Stretcher in lowest position, wheels locked, appropriate side rails in place/Call bell, personal items and telephone in reach/Instruct patient to call for assistance before getting out of bed/chair/stretcher/Non-slip footwear applied when patient is off stretcher/[place] to call system/Physically safe environment - no spills, clutter or unnecessary equipment/Purposeful proactive rounding/Room/bathroom lighting operational, light cord in reach Bed/Stretcher in lowest position, wheels locked, appropriate side rails in place/Call bell, personal items and telephone in reach/Instruct patient to call for assistance before getting out of bed/chair/stretcher/Non-slip footwear applied when patient is off stretcher/[place] to call system/Physically safe environment - no spills, clutter or unnecessary equipment/Purposeful proactive rounding/Room/bathroom lighting operational, light cord in reach

## 2023-12-04 NOTE — ED BEHAVIORAL HEALTH ASSESSMENT NOTE - SUMMARY
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  Pt reports resolution of sx, though minimizes role of cannabis and is guarded historian. Pt's father reports he brought pt as he suspects pt smoked cannabis resin vs K2 which resulted in pt's agitation increasing 1 month ago, reported as yet his only observation regarding pt's behavior was that pt responded verbally in a different way than usual. Pt does not meed threshold for involuntary admission and is not interested in voluntary admission. Pt is psychiatrically cleared to leave the ED. 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  Pt reports resolution of sx, though minimizes role of cannabis and is guarded historian. Pt's father reports he brought pt as he suspects pt smoked cannabis resin vs K2 which resulted in pt's agitation increasing 1 month ago, reported as yet his only observation regarding pt's behavior was that pt responded verbally in a different way than usual. Pt does not meed threshold for involuntary admission and is not interested in voluntary admission. Pt is psychiatrically cleared to leave the ED.

## 2023-12-30 ENCOUNTER — INPATIENT (INPATIENT)
Facility: HOSPITAL | Age: 33
LOS: 3 days | Discharge: HOME | DRG: 812 | End: 2024-01-03
Attending: INTERNAL MEDICINE | Admitting: HOSPITALIST
Payer: MEDICAID

## 2023-12-30 VITALS
RESPIRATION RATE: 20 BRPM | TEMPERATURE: 98 F | SYSTOLIC BLOOD PRESSURE: 161 MMHG | DIASTOLIC BLOOD PRESSURE: 86 MMHG | WEIGHT: 139.99 LBS | HEIGHT: 68 IN | HEART RATE: 95 BPM | OXYGEN SATURATION: 98 %

## 2023-12-30 DIAGNOSIS — Z94.7 CORNEAL TRANSPLANT STATUS: Chronic | ICD-10-CM

## 2023-12-30 DIAGNOSIS — F13.930 SEDATIVE, HYPNOTIC OR ANXIOLYTIC USE, UNSPECIFIED WITH WITHDRAWAL, UNCOMPLICATED: ICD-10-CM

## 2023-12-30 DIAGNOSIS — F13.939 SEDATIVE, HYPNOTIC OR ANXIOLYTIC USE, UNSPECIFIED WITH WITHDRAWAL, UNSPECIFIED: ICD-10-CM

## 2023-12-30 DIAGNOSIS — F19.94 OTHER PSYCHOACTIVE SUBSTANCE USE, UNSPECIFIED WITH PSYCHOACTIVE SUBSTANCE-INDUCED MOOD DISORDER: ICD-10-CM

## 2023-12-30 DIAGNOSIS — F19.90 OTHER PSYCHOACTIVE SUBSTANCE USE, UNSPECIFIED, UNCOMPLICATED: ICD-10-CM

## 2023-12-30 LAB
ALBUMIN SERPL ELPH-MCNC: 4.8 G/DL — SIGNIFICANT CHANGE UP (ref 3.5–5.2)
ALBUMIN SERPL ELPH-MCNC: 4.8 G/DL — SIGNIFICANT CHANGE UP (ref 3.5–5.2)
ALP SERPL-CCNC: 81 U/L — SIGNIFICANT CHANGE UP (ref 30–115)
ALP SERPL-CCNC: 81 U/L — SIGNIFICANT CHANGE UP (ref 30–115)
ALT FLD-CCNC: 35 U/L — SIGNIFICANT CHANGE UP (ref 0–41)
ALT FLD-CCNC: 35 U/L — SIGNIFICANT CHANGE UP (ref 0–41)
ANION GAP SERPL CALC-SCNC: 14 MMOL/L — SIGNIFICANT CHANGE UP (ref 7–14)
ANION GAP SERPL CALC-SCNC: 14 MMOL/L — SIGNIFICANT CHANGE UP (ref 7–14)
ANION GAP SERPL CALC-SCNC: 16 MMOL/L — HIGH (ref 7–14)
ANION GAP SERPL CALC-SCNC: 16 MMOL/L — HIGH (ref 7–14)
APAP SERPL-MCNC: <5 UG/ML — LOW (ref 10–30)
APAP SERPL-MCNC: <5 UG/ML — LOW (ref 10–30)
AST SERPL-CCNC: 39 U/L — SIGNIFICANT CHANGE UP (ref 0–41)
AST SERPL-CCNC: 39 U/L — SIGNIFICANT CHANGE UP (ref 0–41)
BASOPHILS # BLD AUTO: 0.05 K/UL — SIGNIFICANT CHANGE UP (ref 0–0.2)
BASOPHILS # BLD AUTO: 0.05 K/UL — SIGNIFICANT CHANGE UP (ref 0–0.2)
BASOPHILS NFR BLD AUTO: 0.5 % — SIGNIFICANT CHANGE UP (ref 0–1)
BASOPHILS NFR BLD AUTO: 0.5 % — SIGNIFICANT CHANGE UP (ref 0–1)
BILIRUB DIRECT SERPL-MCNC: 0.2 MG/DL — SIGNIFICANT CHANGE UP (ref 0–0.3)
BILIRUB DIRECT SERPL-MCNC: 0.2 MG/DL — SIGNIFICANT CHANGE UP (ref 0–0.3)
BILIRUB INDIRECT FLD-MCNC: 0.6 MG/DL — SIGNIFICANT CHANGE UP (ref 0.2–1.2)
BILIRUB INDIRECT FLD-MCNC: 0.6 MG/DL — SIGNIFICANT CHANGE UP (ref 0.2–1.2)
BILIRUB SERPL-MCNC: 0.8 MG/DL — SIGNIFICANT CHANGE UP (ref 0.2–1.2)
BILIRUB SERPL-MCNC: 0.8 MG/DL — SIGNIFICANT CHANGE UP (ref 0.2–1.2)
BUN SERPL-MCNC: 16 MG/DL — SIGNIFICANT CHANGE UP (ref 10–20)
BUN SERPL-MCNC: 16 MG/DL — SIGNIFICANT CHANGE UP (ref 10–20)
BUN SERPL-MCNC: 8 MG/DL — LOW (ref 10–20)
BUN SERPL-MCNC: 8 MG/DL — LOW (ref 10–20)
CALCIUM SERPL-MCNC: 9.4 MG/DL — SIGNIFICANT CHANGE UP (ref 8.4–10.5)
CALCIUM SERPL-MCNC: 9.4 MG/DL — SIGNIFICANT CHANGE UP (ref 8.4–10.5)
CALCIUM SERPL-MCNC: 9.9 MG/DL — SIGNIFICANT CHANGE UP (ref 8.4–10.5)
CALCIUM SERPL-MCNC: 9.9 MG/DL — SIGNIFICANT CHANGE UP (ref 8.4–10.5)
CHLORIDE SERPL-SCNC: 101 MMOL/L — SIGNIFICANT CHANGE UP (ref 98–110)
CHLORIDE SERPL-SCNC: 101 MMOL/L — SIGNIFICANT CHANGE UP (ref 98–110)
CHLORIDE SERPL-SCNC: 102 MMOL/L — SIGNIFICANT CHANGE UP (ref 98–110)
CHLORIDE SERPL-SCNC: 102 MMOL/L — SIGNIFICANT CHANGE UP (ref 98–110)
CO2 SERPL-SCNC: 22 MMOL/L — SIGNIFICANT CHANGE UP (ref 17–32)
CO2 SERPL-SCNC: 22 MMOL/L — SIGNIFICANT CHANGE UP (ref 17–32)
CO2 SERPL-SCNC: 23 MMOL/L — SIGNIFICANT CHANGE UP (ref 17–32)
CO2 SERPL-SCNC: 23 MMOL/L — SIGNIFICANT CHANGE UP (ref 17–32)
CREAT SERPL-MCNC: 0.8 MG/DL — SIGNIFICANT CHANGE UP (ref 0.7–1.5)
CREAT SERPL-MCNC: 0.8 MG/DL — SIGNIFICANT CHANGE UP (ref 0.7–1.5)
CREAT SERPL-MCNC: 0.9 MG/DL — SIGNIFICANT CHANGE UP (ref 0.7–1.5)
CREAT SERPL-MCNC: 0.9 MG/DL — SIGNIFICANT CHANGE UP (ref 0.7–1.5)
EGFR: 116 ML/MIN/1.73M2 — SIGNIFICANT CHANGE UP
EGFR: 116 ML/MIN/1.73M2 — SIGNIFICANT CHANGE UP
EGFR: 120 ML/MIN/1.73M2 — SIGNIFICANT CHANGE UP
EGFR: 120 ML/MIN/1.73M2 — SIGNIFICANT CHANGE UP
EOSINOPHIL # BLD AUTO: 0.04 K/UL — SIGNIFICANT CHANGE UP (ref 0–0.7)
EOSINOPHIL # BLD AUTO: 0.04 K/UL — SIGNIFICANT CHANGE UP (ref 0–0.7)
EOSINOPHIL NFR BLD AUTO: 0.4 % — SIGNIFICANT CHANGE UP (ref 0–8)
EOSINOPHIL NFR BLD AUTO: 0.4 % — SIGNIFICANT CHANGE UP (ref 0–8)
ETHANOL SERPL-MCNC: <10 MG/DL — SIGNIFICANT CHANGE UP
ETHANOL SERPL-MCNC: <10 MG/DL — SIGNIFICANT CHANGE UP
GLUCOSE SERPL-MCNC: 109 MG/DL — HIGH (ref 70–99)
GLUCOSE SERPL-MCNC: 109 MG/DL — HIGH (ref 70–99)
GLUCOSE SERPL-MCNC: 93 MG/DL — SIGNIFICANT CHANGE UP (ref 70–99)
GLUCOSE SERPL-MCNC: 93 MG/DL — SIGNIFICANT CHANGE UP (ref 70–99)
HCT VFR BLD CALC: 45.2 % — SIGNIFICANT CHANGE UP (ref 42–52)
HCT VFR BLD CALC: 45.2 % — SIGNIFICANT CHANGE UP (ref 42–52)
HGB BLD-MCNC: 15.6 G/DL — SIGNIFICANT CHANGE UP (ref 14–18)
HGB BLD-MCNC: 15.6 G/DL — SIGNIFICANT CHANGE UP (ref 14–18)
IMM GRANULOCYTES NFR BLD AUTO: 0.3 % — SIGNIFICANT CHANGE UP (ref 0.1–0.3)
IMM GRANULOCYTES NFR BLD AUTO: 0.3 % — SIGNIFICANT CHANGE UP (ref 0.1–0.3)
LYMPHOCYTES # BLD AUTO: 1.66 K/UL — SIGNIFICANT CHANGE UP (ref 1.2–3.4)
LYMPHOCYTES # BLD AUTO: 1.66 K/UL — SIGNIFICANT CHANGE UP (ref 1.2–3.4)
LYMPHOCYTES # BLD AUTO: 17 % — LOW (ref 20.5–51.1)
LYMPHOCYTES # BLD AUTO: 17 % — LOW (ref 20.5–51.1)
MAGNESIUM SERPL-MCNC: 2.2 MG/DL — SIGNIFICANT CHANGE UP (ref 1.8–2.4)
MAGNESIUM SERPL-MCNC: 2.2 MG/DL — SIGNIFICANT CHANGE UP (ref 1.8–2.4)
MCHC RBC-ENTMCNC: 31.9 PG — HIGH (ref 27–31)
MCHC RBC-ENTMCNC: 31.9 PG — HIGH (ref 27–31)
MCHC RBC-ENTMCNC: 34.5 G/DL — SIGNIFICANT CHANGE UP (ref 32–37)
MCHC RBC-ENTMCNC: 34.5 G/DL — SIGNIFICANT CHANGE UP (ref 32–37)
MCV RBC AUTO: 92.4 FL — SIGNIFICANT CHANGE UP (ref 80–94)
MCV RBC AUTO: 92.4 FL — SIGNIFICANT CHANGE UP (ref 80–94)
MONOCYTES # BLD AUTO: 0.58 K/UL — SIGNIFICANT CHANGE UP (ref 0.1–0.6)
MONOCYTES # BLD AUTO: 0.58 K/UL — SIGNIFICANT CHANGE UP (ref 0.1–0.6)
MONOCYTES NFR BLD AUTO: 5.9 % — SIGNIFICANT CHANGE UP (ref 1.7–9.3)
MONOCYTES NFR BLD AUTO: 5.9 % — SIGNIFICANT CHANGE UP (ref 1.7–9.3)
NEUTROPHILS # BLD AUTO: 7.39 K/UL — HIGH (ref 1.4–6.5)
NEUTROPHILS # BLD AUTO: 7.39 K/UL — HIGH (ref 1.4–6.5)
NEUTROPHILS NFR BLD AUTO: 75.9 % — HIGH (ref 42.2–75.2)
NEUTROPHILS NFR BLD AUTO: 75.9 % — HIGH (ref 42.2–75.2)
NRBC # BLD: 0 /100 WBCS — SIGNIFICANT CHANGE UP (ref 0–0)
NRBC # BLD: 0 /100 WBCS — SIGNIFICANT CHANGE UP (ref 0–0)
PHOSPHATE SERPL-MCNC: 4.1 MG/DL — SIGNIFICANT CHANGE UP (ref 2.1–4.9)
PHOSPHATE SERPL-MCNC: 4.1 MG/DL — SIGNIFICANT CHANGE UP (ref 2.1–4.9)
PLATELET # BLD AUTO: 363 K/UL — SIGNIFICANT CHANGE UP (ref 130–400)
PLATELET # BLD AUTO: 363 K/UL — SIGNIFICANT CHANGE UP (ref 130–400)
PMV BLD: 9.6 FL — SIGNIFICANT CHANGE UP (ref 7.4–10.4)
PMV BLD: 9.6 FL — SIGNIFICANT CHANGE UP (ref 7.4–10.4)
POTASSIUM SERPL-MCNC: 3.7 MMOL/L — SIGNIFICANT CHANGE UP (ref 3.5–5)
POTASSIUM SERPL-MCNC: 3.7 MMOL/L — SIGNIFICANT CHANGE UP (ref 3.5–5)
POTASSIUM SERPL-MCNC: 4 MMOL/L — SIGNIFICANT CHANGE UP (ref 3.5–5)
POTASSIUM SERPL-MCNC: 4 MMOL/L — SIGNIFICANT CHANGE UP (ref 3.5–5)
POTASSIUM SERPL-SCNC: 3.7 MMOL/L — SIGNIFICANT CHANGE UP (ref 3.5–5)
POTASSIUM SERPL-SCNC: 3.7 MMOL/L — SIGNIFICANT CHANGE UP (ref 3.5–5)
POTASSIUM SERPL-SCNC: 4 MMOL/L — SIGNIFICANT CHANGE UP (ref 3.5–5)
POTASSIUM SERPL-SCNC: 4 MMOL/L — SIGNIFICANT CHANGE UP (ref 3.5–5)
PROT SERPL-MCNC: 7.6 G/DL — SIGNIFICANT CHANGE UP (ref 6–8)
PROT SERPL-MCNC: 7.6 G/DL — SIGNIFICANT CHANGE UP (ref 6–8)
RBC # BLD: 4.89 M/UL — SIGNIFICANT CHANGE UP (ref 4.7–6.1)
RBC # BLD: 4.89 M/UL — SIGNIFICANT CHANGE UP (ref 4.7–6.1)
RBC # FLD: 12.1 % — SIGNIFICANT CHANGE UP (ref 11.5–14.5)
RBC # FLD: 12.1 % — SIGNIFICANT CHANGE UP (ref 11.5–14.5)
SALICYLATES SERPL-MCNC: <0.3 MG/DL — LOW (ref 4–30)
SALICYLATES SERPL-MCNC: <0.3 MG/DL — LOW (ref 4–30)
SODIUM SERPL-SCNC: 138 MMOL/L — SIGNIFICANT CHANGE UP (ref 135–146)
SODIUM SERPL-SCNC: 138 MMOL/L — SIGNIFICANT CHANGE UP (ref 135–146)
SODIUM SERPL-SCNC: 140 MMOL/L — SIGNIFICANT CHANGE UP (ref 135–146)
SODIUM SERPL-SCNC: 140 MMOL/L — SIGNIFICANT CHANGE UP (ref 135–146)
WBC # BLD: 9.75 K/UL — SIGNIFICANT CHANGE UP (ref 4.8–10.8)
WBC # BLD: 9.75 K/UL — SIGNIFICANT CHANGE UP (ref 4.8–10.8)
WBC # FLD AUTO: 9.75 K/UL — SIGNIFICANT CHANGE UP (ref 4.8–10.8)
WBC # FLD AUTO: 9.75 K/UL — SIGNIFICANT CHANGE UP (ref 4.8–10.8)

## 2023-12-30 PROCEDURE — 82746 ASSAY OF FOLIC ACID SERUM: CPT

## 2023-12-30 PROCEDURE — 81003 URINALYSIS AUTO W/O SCOPE: CPT

## 2023-12-30 PROCEDURE — 80307 DRUG TEST PRSMV CHEM ANLYZR: CPT

## 2023-12-30 PROCEDURE — 84100 ASSAY OF PHOSPHORUS: CPT

## 2023-12-30 PROCEDURE — 85027 COMPLETE CBC AUTOMATED: CPT

## 2023-12-30 PROCEDURE — 36415 COLL VENOUS BLD VENIPUNCTURE: CPT

## 2023-12-30 PROCEDURE — 99285 EMERGENCY DEPT VISIT HI MDM: CPT

## 2023-12-30 PROCEDURE — 80053 COMPREHEN METABOLIC PANEL: CPT

## 2023-12-30 PROCEDURE — 83735 ASSAY OF MAGNESIUM: CPT

## 2023-12-30 PROCEDURE — 80048 BASIC METABOLIC PNL TOTAL CA: CPT

## 2023-12-30 PROCEDURE — 80346 BENZODIAZEPINES1-12: CPT

## 2023-12-30 PROCEDURE — 80076 HEPATIC FUNCTION PANEL: CPT

## 2023-12-30 RX ORDER — THIAMINE MONONITRATE (VIT B1) 100 MG
100 TABLET ORAL DAILY
Refills: 0 | Status: DISCONTINUED | OUTPATIENT
Start: 2023-12-30 | End: 2024-01-03

## 2023-12-30 RX ORDER — NALTREXONE HYDROCHLORIDE 50 MG/1
1 TABLET, FILM COATED ORAL
Refills: 0 | DISCHARGE

## 2023-12-30 RX ORDER — NALTREXONE HYDROCHLORIDE 50 MG/1
50 TABLET, FILM COATED ORAL DAILY
Refills: 0 | Status: DISCONTINUED | OUTPATIENT
Start: 2023-12-30 | End: 2024-01-03

## 2023-12-30 RX ORDER — OLANZAPINE 15 MG/1
5 TABLET, FILM COATED ORAL DAILY
Refills: 0 | Status: DISCONTINUED | OUTPATIENT
Start: 2023-12-30 | End: 2024-01-03

## 2023-12-30 RX ORDER — CALAMINE AND ZINC OXIDE AND PHENOL 160; 10 MG/ML; MG/ML
1 LOTION TOPICAL
Refills: 0 | Status: DISCONTINUED | OUTPATIENT
Start: 2023-12-30 | End: 2024-01-03

## 2023-12-30 RX ORDER — ENOXAPARIN SODIUM 100 MG/ML
40 INJECTION SUBCUTANEOUS EVERY 24 HOURS
Refills: 0 | Status: DISCONTINUED | OUTPATIENT
Start: 2023-12-30 | End: 2024-01-03

## 2023-12-30 RX ORDER — FOLIC ACID 0.8 MG
1 TABLET ORAL DAILY
Refills: 0 | Status: DISCONTINUED | OUTPATIENT
Start: 2023-12-30 | End: 2024-01-03

## 2023-12-30 RX ADMIN — Medication 50 MILLIGRAM(S): at 11:16

## 2023-12-30 RX ADMIN — Medication 1 TABLET(S): at 22:24

## 2023-12-30 RX ADMIN — Medication 50 MILLIGRAM(S): at 17:53

## 2023-12-30 RX ADMIN — Medication 100 MILLIGRAM(S): at 22:24

## 2023-12-30 RX ADMIN — Medication 1 MILLIGRAM(S): at 22:24

## 2023-12-30 NOTE — ED BEHAVIORAL HEALTH ASSESSMENT NOTE - DIFFERENTIAL
Substance induced mood disorder v MDD v delirium secondary to substance intoxication/withdrawal v adjustment disorder v substance use disorder

## 2023-12-30 NOTE — ED ADULT TRIAGE NOTE - CHIEF COMPLAINT QUOTE
Pt requesting detox from alcohol and states he has been abusing his medication, unsure of name, denies si/hi

## 2023-12-30 NOTE — ED BEHAVIORAL HEALTH ASSESSMENT NOTE - OTHER
Odd Deferred Notable scars and lacerations on face and hands Moving arms in odd anxious manner throughout interview [place] but superficially cooperative [place] but superficially cooperative Per chart review, history of possible intellectual disability

## 2023-12-30 NOTE — ED PROVIDER NOTE - PHYSICAL EXAMINATION
CONSTITUTIONAL: Well-developed; well-nourished; in no acute distress.   SKIN: Warm, dry  HEAD: Normocephalic; atraumatic  EYES: PERRL, EOMI, normal sclera and conjunctiva   ENT: No nasal discharge; airway clear. Mild tongue fasciculations.  CARD: Tachycardic. Regular rhythm. Normal S1, S2  RESP: No increased WOB. Normal respiratory effort. CTA b/l without wheezes, crackles, rhonchi  ABD: Soft, nontender, nondistended.  EXT: Normal ROM. Palms moist  NEURO: Alert, oriented, grossly unremarkable  PSYCH: Cooperative, appropriate.

## 2023-12-30 NOTE — H&P ADULT - HISTORY OF PRESENT ILLNESS
33-year-old male with PMH ADHD, ORIN, alcohol use disorder presenting after taking 3 tablets of OxyContin this morning. Pt normally takes klonopin 0.5mg daily for his ORIN but says that the klonopin doesn't help manage his anxiety and so he took 3 oxycontin pills. Says he found the pills on the corner of the street, however his mother Niki at bedside says that he bought the oxycontin.   Patient denies suicidal ideation or homicidal ideation.  Patient states that the first time he is taking any opioid ever or any other drug use ever.  Patient states is also the first time he is ever taken anything with an intention of overdosing.  Patient reports he was previously clean from alcohol but drink from 12/23 to 12/25.  Patient also reports he is on a medication for his anxiety that he does not know; does not know when he last took it. 33-year-old male with PMH ADHD, ORIN, alcohol use disorder presenting after taking 3 tablets of OxyContin this morning. Pt normally takes klonopin 0.5mg daily for his ORIN but says that the klonopin doesn't help manage his anxiety and so he took 3 oxycontin pills.  Says he found the pills on the corner of the street, however his mother  at bedside says that he bought the oxycontin. His mother says that he's prescribed a 14 day supply of klonopin 0.5mg daily at a time from [facility] and is allowed to take it BID if needed and thus ran out of it.   Patient denies suicidal ideation or homicidal ideation. He also reports a hx of alcohol abuse and has been clean for sometime but says he had drank between 12/23-12/25 but doesn't specify what he drank or the quantity. His mother says that he has been missing from her apartment and has been wandering the streets and doesn't know what else he has been up to while away from home. Smokes marijuana as well. No chest pain, SOB, N/V, lightheadedness, dizziness.       In the ED, /86. Labs unremarkable.  33-year-old male with PMH ADHD, ORIN, alcohol use disorder presenting after taking 3 tablets of OxyContin this morning. Pt normally takes klonopin 0.5mg daily for his ORIN but says that the klonopin doesn't help manage his anxiety and so he took 3 oxycontin pills.  Says he found the pills on the corner of the street, however his mother  at bedside says that he bought the oxycontin. His mother says that he's prescribed a 14 day supply of klonopin 0.5mg daily at a time from [facility] and is allowed to take it BID if needed and thus ran out of it.   Patient denies suicidal ideation or homicidal ideation. He also reports a hx of alcohol abuse and has been clean for sometime but says he had drank between 12/23-12/25 but doesn't specify what he drank or the quantity. His mother says that he has been missing from her apartment and has been wandering the streets and doesn't know what else he has been up to while away from home. Smokes marijuana as well. No chest pain, SOB, N/V, lightheadedness, dizziness.       In the ED, /86. Labs unremarkable.

## 2023-12-30 NOTE — ED PROVIDER NOTE - OBJECTIVE STATEMENT
33-year-old male with PMH ADHD, anxiety, alcohol use disorder presenting after taking 3 tablets of OxyContin this morning with the intention of hurting himself.  Patient denies suicidal ideation or homicidal ideation.  Patient states that the first time he is taking any opioid ever or any other drug use ever.  Patient states is also the first time he is ever taken anything with an intention of overdosing.  Patient reports he was previously clean from alcohol but drink from 12/23 to 12/25.  Patient also reports he is on a medication for his anxiety that he does not know; does not know when he last took it.

## 2023-12-30 NOTE — ED BEHAVIORAL HEALTH ASSESSMENT NOTE - NSBHATTESTCOMMENTATTENDFT_PSY_A_CORE
Pt is seen and evaluated with resident.   Pt adamantly denies SI/HI/AH. He says he uses street drugs, including benzos (in addition to prescribed klonopin). Pt is tachycardic.  He says that he felt w/d from benzos and wanted to feel better, so he took 3 tabs of oxy. Denies SA or intent.  Pt wants to go to detox/rehab (doesn't want IPP). He will be admitted to medicine for w/d .

## 2023-12-30 NOTE — ED PROVIDER NOTE - CLINICAL SUMMARY MEDICAL DECISION MAKING FREE TEXT BOX
33-year-old man, history of anxiety, ADHD, alcohol use disorder, benzodiazepine use disorder presented after taking OxyContin this morning intending to hurt himself (3 tablets).  No SI or HI.  Vital signs, exam as noted, patient is anxious, CIWA 3, not acutely toxic appearing.  Labs okay.  Concern for benzodiazepine withdrawal.  Psychiatry evaluated patient, recommended medical admission with psych following for possible admission after medically supervised detox.  Patient and family amenable.

## 2023-12-30 NOTE — ED BEHAVIORAL HEALTH ASSESSMENT NOTE - DESCRIPTION
denies as in HPI Patient brought himself into the ED after taking 3 pills of oxycodone with request to be taken to Detox. Patient was noted to be tachycardic and have elevated blood pressure and was given Librium 50 mg. Patient was then evaluated by Psychiatry.    Vital Signs Last 24 Hrs  T(C): 36.7 (30 Dec 2023 08:33), Max: 36.7 (30 Dec 2023 08:33)  T(F): 98.1 (30 Dec 2023 08:33), Max: 98.1 (30 Dec 2023 08:33)  HR: 95 (30 Dec 2023 08:33) (95 - 95)  BP: 161/86 (30 Dec 2023 08:33) (161/86 - 161/86)  BP(mean): --  RR: 20 (30 Dec 2023 08:33) (20 - 20)  SpO2: 98% (30 Dec 2023 08:33) (98% - 98%)    Parameters below as of 30 Dec 2023 08:33  Patient On (Oxygen Delivery Method): room air Patient reported to live at home with his mother, have 2 children, and have graduated high school Asthma

## 2023-12-30 NOTE — H&P ADULT - NSHPPHYSICALEXAM_GEN_ALL_CORE
GENERAL: anxious appearing  HEENT: Normocephalic, atraumatic  PULMONARY: Clear to auscultation bilaterally. No rales, ronchi, or wheezing.   CARDIOVASCULAR: Regular rate and rhythm, S1-S2, no murmurs   GASTROINTESTINAL: Soft, non-tender, non-distended, no guarding.   SKIN/EXTREMITIES: area of erythema on neck extending to clavicles  NEUROLOGIC: AAOX3

## 2023-12-30 NOTE — H&P ADULT - ASSESSMENT
33-year-old male with PMH ADHD, ORIN, alcohol use disorder presenting after taking 3 tablets of OxyContin this morning      #Benzodiazepine withdrawal  #Hx of alcohol abuse  #ORIN, ADHD   33-year-old male with PMH ADHD, ORIN, alcohol use disorder presenting after taking 3 tablets of OxyContin this morning      #Benzodiazepine withdrawal  #Hx of alcohol abuse  #ORIN, ADHD  - ran out of klonopin; says he took 3 oxycontin, not in an attempt to hurt himself but for anxiety b/c he says the klonopin doesn't help  - -CIWA 5   - seen by behavioral health, recommending admission for detox due to benzo/alcohol withdrawal  - no reported hx of seizures by pts  and pts mother  - would continue outpatient naltrexone 50mg daily dose  - librium taper  - thiamine/folate  - keep K>4, Mg>2  - CATCH/addiction medicine consult      #Pruritis, likely contact dermatitis  - says he may have come into contact with poison ivy while he went messing for the past 2 days  - well demarcated erythema on neck extending to clavicles, dry  - no fever, wbc  - calamine lotion 33-year-old male with PMH ADHD, ORIN, alcohol use disorder presenting after taking 3 tablets of OxyContin this morning      #Benzodiazepine withdrawal  #Hx of alcohol abuse  #ORIN, ADHD  - ran out of klonopin; says he took 3 oxycontin, not in an attempt to hurt himself but for anxiety b/c he says the klonopin doesn't help  - -CIWA 5   - seen by behavioral health, recommending admission for detox due to benzo/alcohol withdrawal  - no reported hx of seizures by pts  and pts mother  - would continue outpatient naltrexone 50mg daily dose  - librium taper  - thiamine/folate/ multivitamin  - keep K>4, Mg>2  - CATCH/addiction medicine consult  - unclear why pt on prazosin (pt and pts mother doesn't know why)  - pt receives medications from [facility] ([phone])      #Pruritis, likely contact dermatitis  - says he may have come into contact with poison ivy while he went messing for the past 2 days  - well demarcated erythema on neck extending to clavicles, dry  - no fever, wbc  - calamine lotion    DVT ppx: lovenox  GI ppx: N/A  Diet: Regular  Full code    *Pt's mother Niki prefers all updates, including discharge, regarding pts care be directed to her ([phone])* 33-year-old male with PMH ADHD, ORIN, alcohol use disorder presenting after taking 3 tablets of OxyContin this morning      #Benzodiazepine withdrawal  #Hx of alcohol abuse  #ORIN, ADHD  - ran out of klonopin; says he took 3 oxycontin, not in an attempt to hurt himself but for anxiety b/c he says the klonopin doesn't help  - -CIWA 5   - seen by behavioral health, recommending admission for detox due to benzo/alcohol withdrawal  - no reported hx of seizures by pts  and pts mother  - would continue outpatient naltrexone 50mg daily dose  - librium taper  - thiamine/folate/ multivitamin  - keep K>4, Mg>2  - CATCH/addiction medicine consult  - unclear why pt on prazosin (pt and pts mother doesn't know why)  - pt receives medications from [facility] ([phone])      #Pruritis, likely contact dermatitis  - says he may have come into contact with poison ivy while he went messing for the past 2 days  - well demarcated erythema on neck extending to clavicles, dry  - no fever, wbc  - calamine lotion    DVT ppx: lovenox  GI ppx: N/A  Diet: Regular  Full code    *Pt's mother Niki prefers all updates, including discharge, regarding pts care be directed to her ([phone])* 33-year-old male with PMH ADHD, ORIN, alcohol use disorder presenting after taking 3 tablets of OxyContin this morning      #Benzodiazepine withdrawal  #Hx of alcohol abuse  #ORIN, ADHD  - ran out of klonopin; says he took 3 oxycontin, not in an attempt to hurt himself but for anxiety b/c he says the klonopin doesn't help  - -CIWA 5   - seen by behavioral health, recommending admission for detox due to benzo/alcohol withdrawal  - no reported hx of seizures by pts  and pts mother  - would continue outpatient naltrexone 50mg daily dose  - librium taper  - thiamine/folate/ multivitamin  - keep K>4, Mg>2  - tox  - CATCH/addiction medicine consult  - unclear why pt on prazosin (pt and pts mother doesn't know why)  - pt receives medications from [facility] ([phone])      #Pruritis, likely contact dermatitis  - says he may have come into contact with poison ivy while he went messing for the past 2 days  - well demarcated erythema on neck extending to clavicles, dry  - no fever, wbc  - calamine lotion    DVT ppx: lovenox  GI ppx: N/A  Diet: Regular  Full code    *Pt's mother Niki prefers all updates, including discharge, regarding pts care be directed to her ([phone])* 33-year-old male with PMH ADHD, ORIN, alcohol use disorder presenting after taking 3 tablets of OxyContin this morning      #Benzodiazepine withdrawal  #Hx of alcohol abuse  #ORIN, ADHD  - ran out of klonopin; says he took 3 oxycontin, not in an attempt to hurt himself but for anxiety b/c he says the klonopin doesn't help  - -CIWA 5   - seen by behavioral health, recommending admission for detox due to benzo/alcohol withdrawal  - no reported hx of seizures by pts  and pts mother  - would continue outpatient naltrexone 50mg daily dose  - librium taper  - thiamine/folate/ multivitamin  - keep K>4, Mg>2  - tox  - CATCH/addiction medicine consult  - unclear why pt on prazosin (pt and pts mother doesn't know why)  - pt receives medications from [facility] ([phone])      #Pruritis, likely contact dermatitis  - says he may have come into contact with poison ivy while he went messing for the past 2 days  - well demarcated erythema on neck extending to clavicles, dry  - no fever, wbc  - calamine lotion    DVT ppx: lovenox  GI ppx: N/A  Diet: Regular  Full code    *Pt's mother Niki prefers all updates, including discharge, regarding pts care be directed to her ([phone])*

## 2023-12-30 NOTE — H&P ADULT - NSHPLABSRESULTS_GEN_ALL_CORE
12-30    138  |  101  |  8<L>  ----------------------------<  93  3.7   |  23  |  0.8    Ca    9.9      30 Dec 2023 09:45                          15.6   9.75  )-----------( 363      ( 30 Dec 2023 09:45 )             45.2

## 2023-12-30 NOTE — ED BEHAVIORAL HEALTH ASSESSMENT NOTE - CURRENT MEDICATION
as in HPI Klonopin 0.5 mg once daily Klonopin 0.5 mg once daily, Naltrexone 50 mg once daily, Prazosin 1 mg once daily, and Zyprexa 5 mg once daily

## 2023-12-30 NOTE — ED ADULT NURSE NOTE - NSFALLHARMRISKINTERV_ED_ALL_ED
Assistance OOB with selected safe patient handling equipment if applicable/Assistance with ambulation/Communicate risk of Fall with Harm to all staff, patient, and family/Monitor gait and stability/Monitor for mental status changes and reorient to person, place, and time, as needed/Provide visual cue: red socks, yellow wristband, yellow gown, etc/Reinforce activity limits and safety measures with patient and family/Toileting schedule using arm’s reach rule for commode and bathroom/Use of alarms - bed, stretcher, chair and/or video monitoring/Bed in lowest position, wheels locked, appropriate side rails in place/Call bell, personal items and telephone in reach/Instruct patient to call for assistance before getting out of bed/chair/stretcher/Non-slip footwear applied when patient is off stretcher/[place] to call system/Physically safe environment - no spills, clutter or unnecessary equipment/Purposeful Proactive Rounding/Room/bathroom lighting operational, light cord in reach Assistance OOB with selected safe patient handling equipment if applicable/Assistance with ambulation/Communicate risk of Fall with Harm to all staff, patient, and family/Monitor gait and stability/Monitor for mental status changes and reorient to person, place, and time, as needed/Provide visual cue: red socks, yellow wristband, yellow gown, etc/Reinforce activity limits and safety measures with patient and family/Toileting schedule using arm’s reach rule for commode and bathroom/Use of alarms - bed, stretcher, chair and/or video monitoring/Bed in lowest position, wheels locked, appropriate side rails in place/Call bell, personal items and telephone in reach/Instruct patient to call for assistance before getting out of bed/chair/stretcher/Non-slip footwear applied when patient is off stretcher/[place] to call system/Physically safe environment - no spills, clutter or unnecessary equipment/Purposeful Proactive Rounding/Room/bathroom lighting operational, light cord in reach

## 2023-12-30 NOTE — ED ADULT NURSE NOTE - FINAL NURSING ELECTRONIC SIGNATURE
[facility]  [address] [place] [address] [address]  [phone]    NAME: Sushma Gilmore  AGE: 79 y.o. SEX: male    Progress Note    Location: [facility]   POS: 28 ([facility])  Code Status: Full Code    Chief complaint / Reason for visit:  Follow-up visit    Assessment/Plan:    Ambulatory dysfunction  Encourage use of assistive device  Uses rolling walker  Continue fall precautions  Encourage patient to participate with activities  Provide education for patient, family, and caregivers    Anxiety  Mood stable at time of assessment  Continue to monitor for changes  Continue to provide 24/7 supportive care at [address] [place] [address], right eye  Continue latanoprost ophthalmic solution drop to right eye daily at bedtime  Follow up with ophthalmology [facility] on 12/12/23    Mild cognitive disorder  May require redirection and reorientation  Encourage participation with group activities  Encourage staying mentally active with word searches, crossword puzzles, and suduko   Avoid deliriogenic medications   Continue 24/7 supportive care at [facility]    Type 1 diabetes mellitus with hyperglycemia ([address])  HgA1c on 09/14/23 was 7.5  Encourage CCD  AC HS blood glucose checks  Blood glucose checks reviewed from facility records  Stable   Avoid hypoglycemia  Hypoglycemia protocol  Continue novolog 8 units with breakfast 10 units with lunch and dinner  Continue lantus 17 units SQ at lunch  Repeat HgA1c in January   Follow up with Endocrinology on 03/05/24    History of DVT (deep vein thrombosis)  Continue eliquis 2.5 mg po BID      This is a 79 y.o. male seen today at [facility]. Medical history includes, not limited to, type 1 DM, orthostatic hypotension, peripheral vascular disease, atherosclerotic heart disease, hyperlipidemia, CKD stage 2, hyperlipidemia, and dementia. Resident is seen today for a follow up visit.  Upon entering resident's room today he is sitting out of bed in his chair. He is alert and oriented x 3. Able to answer questions appropriately. He currently denies pain. States she has been eating well. States he has been ambulating with his rolling walker, ambulating more throughout the building. Moving around the hallways, trying to sit in the breezeway. He has been keeping his mind active with crossword puzzles and suduko. Reviewed recent labs, vitals and orders reviewed. Nursing and prior provider notes reviewed on this visit. Discussed visit with PCP and nursing staff/ supervisor. Review of Systems   Constitutional:  Negative for activity change, appetite change, fatigue and fever. HENT:  Negative for congestion and rhinorrhea. Eyes:  Negative for pain and visual disturbance. Respiratory:  Negative for cough and shortness of breath. Cardiovascular:  Negative for chest pain and leg swelling. Gastrointestinal:  Negative for abdominal pain and constipation. Genitourinary:  Negative for difficulty urinating and dysuria. Musculoskeletal:  Negative for arthralgias. Skin:  Negative for color change and rash. Neurological:  Negative for dizziness, syncope and weakness. Psychiatric/Behavioral:  Negative for behavioral problems and confusion. All other systems reviewed and are negative. ALLERGY: Reviewed, unchanged  No Known Allergies    HISTORY:  Medical Hx: Reviewed, unchanged  Family Hx: Reviewed, unchanged  Soc Hx: Reviewed,  unchanged      Physical Exam  Vitals reviewed. Constitutional:       General: He is not in acute distress. Appearance: Normal appearance. He is not ill-appearing. HENT:      Head: Normocephalic. Right Ear: Tympanic membrane normal.      Left Ear: Tympanic membrane normal.      Nose: Nose normal. No congestion. Mouth/Throat:      Mouth: Mucous membranes are moist.      Pharynx: Oropharynx is clear. Eyes:      General:         Right eye: No discharge. Left eye: No discharge. Extraocular Movements: Extraocular movements intact. Conjunctiva/sclera: Conjunctivae normal.      Pupils: Pupils are equal, round, and reactive to light. Cardiovascular:      Rate and Rhythm: Normal rate and regular rhythm. Pulses: Normal pulses. Heart sounds: Normal heart sounds. Pulmonary:      Effort: Pulmonary effort is normal. No respiratory distress. Breath sounds: Normal breath sounds. Chest:      Chest wall: No tenderness. Abdominal:      General: Bowel sounds are normal.      Palpations: Abdomen is soft. Tenderness: There is no abdominal tenderness. Musculoskeletal:         General: No swelling. Normal range of motion. Cervical back: Normal range of motion. Right lower leg: No edema. Left lower leg: No edema. Skin:     General: Skin is warm and dry. Neurological:      Mental Status: He is alert and oriented to person, place, and time. Mental status is at baseline. Motor: No weakness. Psychiatric:         Mood and Affect: Mood normal.         Behavior: Behavior normal.         Thought Content: Thought content normal.         Judgment: Judgment normal.            Laboratory results / Imaging reviewed: Hard copy/ies in medical chart:    Vitals:    11/30/23 1802   BP: 153/82   Pulse: 74       Current Medications: All medications reviewed and updated in Nursing Home Chart    Please note: This note was completed in part utilizing a voice-recognition software may have been used in the preparation of this document. Grammatical errors, random word insertion, spelling mistakes, and incomplete sentences may be an occasional consequence of the system secondary to software limitations, ambient noise and hardware issues. Occasional wrong word or "sound-alike" substitutions may have occurred due to the inherent limitations of voice recognition software. At the time of dictation, efforts were made to edit, clarify and/or correct errors.  Interpretation should be guided by context. Please read the chart carefully and recognize, using context, where substitutions have occurred. If you have any questions or concerns about the context, text or information contained within the body of this dictation, please contact myself, the provider, for further clarification.       TAM Mcgraw  11/30/2023 30-Dec-2023 18:12

## 2023-12-30 NOTE — ED BEHAVIORAL HEALTH ASSESSMENT NOTE - NSBHATTESTBILLING_PSY_A_CORE
[id number] diagnostic evaluation with medical services [id number] diagnostic evaluation with medical services

## 2023-12-30 NOTE — ED BEHAVIORAL HEALTH ASSESSMENT NOTE - HPI (INCLUDE ILLNESS QUALITY, SEVERITY, DURATION, TIMING, CONTEXT, MODIFYING FACTORS, ASSOCIATED SIGNS AND SYMPTOMS)
Patient is a 33 year old single male, domiciled at home in private residence with mother, with PMH of asthma and PPH of Anxiety on Klonopin brought himself into the ED requesting Detox and help getting clean after taking 3 Oxycodone pills he purchased today after feeling distressed about general social distress. Psychiatry was consulted for evaluation if patient's recent drug use was related to suicidality.    Upon approach, 33 year old single male, domiciled at home in private residence with mother, with PMH of asthma and PPH of Anxiety on Klonopin, and per chart review history of ADHD, possible intellectual disability, and alcohol use disorder and cannabis-induced AH, brought himself into the ED requesting Detox and help getting clean after taking 3 Oxycodone pills he purchased today after feeling distressed about general social distress. Psychiatry was consulted for evaluation if patient's recent drug use was related to suicidality.    Upon approach, patient was cooperative but was notably anxious on exam with him holding his hand behind his head. Patient also had bleeding of his left eye and lacerations on both posterior hands. When asked why he was here, patient started speaking about having 3 children with people of three different races, before changing it to 2 children. He reported vague recent social stressors related to racism and difficulty working and how he wanted to return to college, reported that he recently felt depressed, and in this state, he had purchased 3 pills he believed to be Oxycontin to help him feel better and detach from his struggles. Patient also reported that he takes 1 tablet of Klonopin daily for a least month, but was inconsistent about his source, saying that he is both prescribed the medicine and also gets it from the street. Per chart review, patient is a poor historian and on this evaluation and his most recent ED presentation, patient presented with signs concerning for cognitive deficits that impacts his ability to give reliable history. However, patient is alert and oriented x3 and remains consistent in his description of what he wants. Patient consistently endorses that taking these pills was not a suicide attempt. He denied any prior suicide attempts but did endorse two distant past episodes of impulsively cutting himself. Patient repeatedly states that he would benefit from admission for Detox purposes, with him perseverating on his desire to stop using substances. Patient also reports recent inconsistent alcohol use, reporting his last drink was on [place] Karlee. Patient did not endorse any other recent substance use besides alcohol and Klonopin but has history of cannabis use.    Collateral: Attempted to call pt's mother Niki , number disconnected.  Attempted to call pt's father Mariano , went to [facility] twice 33 year old single male, domiciled at home in private residence with mother, with PMH of asthma and PPH of Anxiety on Klonopin, and per chart review history of ADHD, possible intellectual disability, and alcohol use disorder and cannabis-induced AH, brought himself into the ED requesting Detox and help getting clean after taking 3 Oxycodone pills he purchased today after feeling distressed about general social distress. Psychiatry was consulted for evaluation if patient's recent drug use was related to suicidality.    Upon approach, patient was cooperative but was notably anxious on exam with him holding his hand behind his head. Patient also had bleeding of his left eye and lacerations on both posterior hands. When asked why he was here, patient started speaking about having 3 children with people of three different races, before changing it to 2 children. He reported vague recent social stressors related to racism and difficulty working and how he wanted to return to college, reported that he recently felt depressed, and in this state, he had purchased 3 pills he believed to be Oxycontin to help him feel better and detach from his struggles. Patient also reported that he takes 1 tablet of Klonopin daily for a least month, but was inconsistent about his source, saying that he is both prescribed the medicine and also gets it from the street. Per chart review, patient is a poor historian and on this evaluation and his most recent ED presentation, patient presented with signs concerning for cognitive deficits that impacts his ability to give reliable history. However, patient is alert and oriented x3 and remains consistent in his description of what he wants. Patient consistently endorses that taking these pills was not a suicide attempt. He denied any prior suicide attempts but did endorse two distant past episodes of impulsively cutting himself. Patient repeatedly states that he would benefit from admission for Detox purposes, with him perseverating on his desire to stop using substances. Patient also reports recent inconsistent alcohol use, reporting his last drink was on [place] Karlee. Patient did not endorse any other recent substance use besides alcohol and Klonopin but has history of cannabis use.    Collateral: Attempted to call pt's mother Niki , number disconnected.  Attempted to call pt's father Mariano , went to [facility] twice 33 year old single male, domiciled at home in private residence with mother, with PMH of asthma and PPH of Anxiety on Klonopin, and per chart review history of ADHD, possible intellectual disability, and alcohol use disorder and cannabis-induced AH, brought himself into the ED requesting Detox and help getting clean after taking 3 Oxycodone pills he purchased today after feeling distressed about general social distress. Psychiatry was consulted for evaluation if patient's recent drug use was related to suicidality.    Upon approach, patient was cooperative but was notably anxious on exam with him holding his hand behind his head. Patient also had bleeding of his left eye and lacerations on both posterior hands. When asked why he was here, patient started speaking about having 3 children with people of three different races, before changing it to 2 children. He reported vague recent social stressors related to racism and difficulty working and how he wanted to return to college, reported that he recently felt depressed, and in this state, he had purchased 3 pills he believed to be Oxycontin to help him feel better and detach from his struggles. Patient also reported that he takes 1 tablet of Klonopin daily for a least month, but was inconsistent about his source, saying that he is both prescribed the medicine and also gets it from the street. Per chart review, patient is a poor historian and on this evaluation and his most recent ED presentation, patient presented with signs concerning for cognitive deficits that impacts his ability to give reliable history. However, patient is alert and oriented x3 and remains consistent in his description of what he wants. Patient consistently endorses that taking these pills was not a suicide attempt. He denied any prior suicide attempts but did endorse two distant past episodes of impulsively cutting himself. Patient repeatedly states that he would benefit from admission for Detox purposes, with him perseverating on his desire to stop using substances. Patient also reports recent inconsistent alcohol use, reporting his last drink was on [place] Karlee. Patient did not endorse any other recent substance use besides alcohol and Klonopin but has history of cannabis use.    Collateral: Attempted to call pt's mother Niki , number disconnected.  Attempted to call pt's father Mariano , went to voicemail twice    Patient Demographic Information (PDI)       PDI	First Name	Last Name	Birth Date	Gender	Street Address	[facility]	Zip Code  [place]	1990	Male	Ricky ALCARAZ	[facility]	[address]    Prescription Information      PDI Filter:    PDI	Current Rx	Drug Type	Rx Written	Rx Dispensed	Drug	Quantity	Days Supply	Prescriber Name	Prescriber LANDON #	Payment Method	Dispenser  A	Y	B	12/18/2023	12/20/2023	clonazepam 0.5 mg tablet	14	14	[place]ak[place]Azeem	[id number]	Medicaid	[id number]  A	N	B	12/06/2023	12/06/2023	clonazepam 0.5 mg tablet	14	14	[place]	[id number]	Medicaid	[id number]  A	N	B	11/22/2023	11/22/2023	clonazepam 0.5 mg tablet	14	14	Sak[place]Azeem	[id number]	Medicaid	[id number]  A	N	B	08/30/2023	09/02/2023	clonazepam 0.5 mg tablet	14	14	[place]	[id number]	Medicaid	[id number]  A	N	B	08/16/2023	08/18/2023	clonazepam 0.5 mg tablet	14	14	[place]	[id number]	Medicaid	[id number]  A	N	B	07/20/2023	07/21/2023	clonazepam 0.5 mg tablet	14	14	Sak[place]Azeem	[id number]	Medicaid	[id number]  A	N	B	06/28/2023	06/28/2023	clonazepam 0.5 mg tablet	14	14	[place]ak[place]Azeem	[id number]	Medicaid	[id number]  A	N	B	06/08/2023	06/08/2023	clonazepam 0.5 mg tablet	14	14	[place]	[id number]	Medicaid	[id number]  A	N	B	05/25/2023	05/25/2023	clonazepam 0.5 mg tablet	14	14	[place]	[id number]	Medicaid	[id number]  A	N	B	05/10/2023	05/10/2023	clonazepam 0.5 mg tablet	14	14	[place]	[id number]	Medicaid	[id number]  A	N	B	05/05/2023	05/07/2023	lorazepam 0.5 mg tablet	7	7	[place] blanchard	[id number]	Medicaid	[id number]  A	N	B	04/27/2023	04/28/2023	lorazepam 1 mg tablet	7	7	Azeem Presley	[id number]	Medicaid	[id number] 33 year old single male, domiciled at home in private residence with mother, with PMH of asthma and PPH of Anxiety on Klonopin, and per chart review history of ADHD, possible intellectual disability, and alcohol use disorder and cannabis-induced AH, brought himself into the ED requesting Detox and help getting clean after taking 3 Oxycodone pills he purchased today after feeling distressed about general social distress. Psychiatry was consulted for evaluation if patient's recent drug use was related to suicidality.    Upon approach, patient was cooperative but was notably anxious on exam with him holding his hand behind his head. Patient also had bleeding of his left eye and lacerations on both posterior hands. When asked why he was here, patient started speaking about having 3 children with people of three different races, before changing it to 2 children. He reported vague recent social stressors related to racism and difficulty working and how he wanted to return to college, reported that he recently felt depressed, and in this state, he had purchased 3 pills he believed to be Oxycontin to help him feel better and detach from his struggles. Patient also reported that he takes 1 tablet of Klonopin daily for a least month, but was inconsistent about his source, saying that he is both prescribed the medicine and also gets it from the street. Per chart review, patient is a poor historian and on this evaluation and his most recent ED presentation, patient presented with signs concerning for cognitive deficits that impacts his ability to give reliable history. However, patient is alert and oriented x3 and remains consistent in his description of what he wants. Patient consistently endorses that taking these pills was not a suicide attempt. He denied any prior suicide attempts but did endorse two distant past episodes of impulsively cutting himself. Patient repeatedly states that he would benefit from admission for Detox purposes, with him perseverating on his desire to stop using substances. Patient also reports recent inconsistent alcohol use, reporting his last drink was on [place] Karlee. Patient did not endorse any other recent substance use besides alcohol and Klonopin but has history of cannabis use.    Collateral: Attempted to call pt's mother Niki , number disconnected.  Attempted to call pt's father Mariano , went to voicemail twice    Patient Demographic Information (PDI)       PDI	First Name	Last Name	Birth Date	Gender	Street Address	[facility]	Zip Code  [place]	1990	Male	Ricky ALCARAZ	[facility]	[id number]    Prescription Information      PDI Filter:    PDI	Current Rx	Drug Type	Rx Written	Rx Dispensed	Drug	Quantity	Days Supply	Prescriber Name	Prescriber LANDON #	Payment Method	Dispenser  A	Y	B	12/18/2023	12/20/2023	clonazepam 0.5 mg tablet	14	14	SakinaAzeem	[id number]	Medicaid	[id number]  A	N	B	12/06/2023	12/06/2023	clonazepam 0.5 mg tablet	14	14	[place]	[id number]	Medicaid	[id number]  A	N	B	11/22/2023	11/22/2023	clonazepam 0.5 mg tablet	14	14	SakinaAzeem	[id number]	Medicaid	[id number]  A	N	B	08/30/2023	09/02/2023	clonazepam 0.5 mg tablet	14	14	[place]	[id number]	Medicaid	[id number]  A	N	B	08/16/2023	08/18/2023	clonazepam 0.5 mg tablet	14	14	[place]	[id number]	Medicaid	[id number]  A	N	B	07/20/2023	07/21/2023	clonazepam 0.5 mg tablet	14	14	SakinaAzeem	[id number]	Medicaid	[id number]  A	N	B	06/28/2023	06/28/2023	clonazepam 0.5 mg tablet	14	14	SakinaAzeem	[id number]	Medicaid	[facility][id number]  A	N	B	06/08/2023	06/08/2023	clonazepam 0.5 mg tablet	14	14	[place]	[id number]	Medicaid	[id number]  A	N	B	05/25/2023	05/25/2023	clonazepam 0.5 mg tablet	14	14	[place] foss	[id number]	Medicaid	[id number]  A	N	B	05/10/2023	05/10/2023	clonazepam 0.5 mg tablet	14	14	[place]	[id number]	Medicaid	[id number]  A	N	B	05/05/2023	05/07/2023	lorazepam 0.5 mg tablet	7	7	[place] blanchard	[id number]	Medicaid	[id number]  A	N	B	04/27/2023	04/28/2023	lorazepam 1 mg tablet	7	7	Azeem Presley	[id number]	Medicaid	[id number] 33 year old single male, domiciled at home in private residence with mother, with PMH of asthma and PPH of ADHD and ORIN on Klonopin 0.5 mg once daily, Naltrexone 50 mg once daily, Prazosin 1 mg once daily, and Zyprexa 5 mg once daily that patient reported only taking Klonopin, follows up with [facility], contact number [phone],, and per chart review possible intellectual disability and alcohol use disorder and cannabis-induced Auditory Hallucinations, brought himself into the ED requesting Detox and help getting clean after taking 3 Oxycodone pills he purchased today after feeling distressed about general social distress. Psychiatry was consulted for evaluation if patient's recent drug use was related to suicidality.    Upon approach, patient was cooperative but was notably anxious on exam with him holding his hand behind his head. Patient also had bleeding of his left eye and lacerations on both posterior hands. When asked why he was here, patient started speaking about having 3 children with people of three different races, before changing it to 2 children. He reported vague recent social stressors related to racism and difficulty working and how he wanted to return to college, reported that he recently felt depressed, and in this state, he had purchased 3 pills he believed to be Oxycontin to help him feel better and detach from his struggles. Patient also reported that he takes 1 tablet of Klonopin daily for a least month, but was inconsistent about his source, saying that he is both prescribed the medicine and also gets it from the street. Per chart review, patient is a poor historian and on this evaluation and his most recent ED presentation, patient presented with signs concerning for cognitive deficits that impacts his ability to give reliable history. However, patient is alert and oriented x3 and remains consistent in his description of what he wants. Patient consistently endorses that taking these pills was not a suicide attempt. He denied any prior suicide attempts but did endorse two distant past episodes of impulsively cutting himself. Patient repeatedly states that he would benefit from admission for Detox purposes, with him perseverating on his desire to stop using substances. Patient also reports recent inconsistent alcohol use, reporting his last drink was on [place] Karlee. Patient also reported using cannabis yesterday. Patient reported last Klonopin use about two days ago.    Collateral: Attempted to call pt's mother Niki , number disconnected.  Attempted to call pt's father Mariano , went to voicemail twice    Patient's mother Niki Martinez came in person to visit patient and she provided with updated contact number [phone]. She reported that patient has recent depressed mood given psychosocial stressors about his two children. Patient was also reported to have been using 2 of his daily Klonopin two weeks ago which led to him running out of Klonopin one week ago. Patient was reported to occasionally leave the house without telling the mother. Patient also has reported history of laughing and speaking to himself when using or withdrawing from substances. She also provided patient's home medications that patient reported he did not need to take including Klonopin 0.5 mg once daily, Naltrexone 50 mg once daily, Prazosin 1 mg once daily, and Zyprexa 5 mg once daily. Patient follows up with [facility], contact number [phone]. Mother does report that while patient has yelled at mother, he had never physically threatened her or made any suicidal statements or actions.    Patient Demographic Information (PDI)       PDI	First Name	Last Name	Birth Date	Gender	Street Address	[facility]	Zip Code  [place]	1990	Male	[address] [place] [id number]    Prescription Information      PDI Filter:    PDI	Current Rx	Drug Type	Rx Written	Rx Dispensed	Drug	Quantity	Days Supply	Prescriber Name	Prescriber LANDON #	Payment Method	Dispenser  A	Y	B	12/18/2023	12/20/2023	clonazepam 0.5 mg tablet	14	14	Azeem Presley	[id number]	Medicaid	[id number]  A	N	B	12/06/2023	12/06/2023	clonazepam 0.5 mg tablet	14	14	Azeem Presley	[id number]	Medicaid	[id number]  A	N	B	11/22/2023	11/22/2023	clonazepam 0.5 mg tablet	14	14	Azeem Presley	[id number]	Medicaid	[id number]  A	N	B	08/30/2023	09/02/2023	clonazepam 0.5 mg tablet	14	14	Azeem Presley	[id number]	Medicaid	[id number]  A	N	B	08/16/2023	08/18/2023	clonazepam 0.5 mg tablet	14	14	Azeem Presley	[id number]	Medicaid	[id number]  A	N	B	07/20/2023	07/21/2023	clonazepam 0.5 mg tablet	14	14	Azeem Presley	[id number]	Medicaid	[id number]  A	N	B	06/28/2023	06/28/2023	clonazepam 0.5 mg tablet	14	14	[place]	[id number]	Medicaid	[id number]  A	N	B	06/08/2023	06/08/2023	clonazepam 0.5 mg tablet	14	14	[place] Azeem	[id number]	Medicaid	[id number]  A	N	B	05/25/2023	05/25/2023	clonazepam 0.5 mg tablet	14	14	[place]ak[place] Azeem	[id number]	Medicaid	[id number]  A	N	B	05/10/2023	05/10/2023	clonazepam 0.5 mg tablet	14	14	[place] Azeem	[id number]	Medicaid	[id number]  A	N	B	05/05/2023	05/07/2023	lorazepam 0.5 mg tablet	7	7	[place]	[id number]	Medicaid	[id number]  A	N	B	04/27/2023	04/28/2023	lorazepam 1 mg tablet	7	7	Sak[place]Azeem	[id number]	Medicaid	[id number] 33 year old single male, domiciled at home in private residence with mother, with PMH of asthma and PPH of ADHD and ORIN on Klonopin 0.5 mg once daily, Naltrexone 50 mg once daily, Prazosin 1 mg once daily, and Zyprexa 5 mg once daily that patient reported only taking Klonopin, follows up with [facility], contact number [phone],, and per chart review possible intellectual disability and alcohol use disorder and cannabis-induced Auditory Hallucinations, brought himself into the ED requesting Detox and help getting clean after taking 3 Oxycodone pills he purchased today after feeling distressed about general social distress. Psychiatry was consulted for evaluation if patient's recent drug use was related to suicidality.    Upon approach, patient was cooperative but was notably anxious on exam with him holding his hand behind his head. Patient also had bleeding of his left eye and lacerations on both posterior hands. When asked why he was here, patient started speaking about having 3 children with people of three different races, before changing it to 2 children. He reported vague recent social stressors related to racism and difficulty working and how he wanted to return to college, reported that he recently felt depressed, and in this state, he had purchased 3 pills he believed to be Oxycontin to help him feel better and detach from his struggles. Patient also reported that he takes 1 tablet of Klonopin daily for a least month, but was inconsistent about his source, saying that he is both prescribed the medicine and also gets it from the street. Per chart review, patient is a poor historian and on this evaluation and his most recent ED presentation, patient presented with signs concerning for cognitive deficits that impacts his ability to give reliable history. However, patient is alert and oriented x3 and remains consistent in his description of what he wants. Patient consistently endorses that taking these pills was not a suicide attempt. He denied any prior suicide attempts but did endorse two distant past episodes of impulsively cutting himself. Patient repeatedly states that he would benefit from admission for Detox purposes, with him perseverating on his desire to stop using substances. Patient also reports recent inconsistent alcohol use, reporting his last drink was on [place] Karlee. Patient also reported using cannabis yesterday. Patient reported last Klonopin use about two days ago.    Collateral: Attempted to call pt's mother Niki , number disconnected.  Attempted to call pt's father Mariano , went to voicemail twice    Patient's mother Niki Martinez came in person to visit patient and she provided with updated contact number [phone]. She reported that patient has recent depressed mood given psychosocial stressors about his two children. Patient was also reported to have been using 2 of his daily Klonopin two weeks ago which led to him running out of Klonopin one week ago. Patient was reported to occasionally leave the house without telling the mother. Patient also has reported history of laughing and speaking to himself when using or withdrawing from substances. She also provided patient's home medications that patient reported he did not need to take including Klonopin 0.5 mg once daily, Naltrexone 50 mg once daily, Prazosin 1 mg once daily, and Zyprexa 5 mg once daily. Patient [facility] up with [facility], contact number [phone]. Mother does report that while patient has yelled at mother, he had never physically threatened her or made any suicidal statements or actions.    Patient Demographic Information (PDI)       PDI	First Name	Last Name	Birth Date	Gender	Street Address	[facility]	Zip Code  [place]	1990	Male	[address] [place] [address]    Prescription Information      PDI Filter:    PDI	Current Rx	Drug Type	Rx Written	Rx Dispensed	Drug	Quantity	Days Supply	Prescriber Name	Prescriber LANDON #	Payment Method	Dispenser  A	Y	B	12/18/2023	12/20/2023	clonazepam 0.5 mg tablet	14	14	Azeem Presley	[id number]	Medicaid	[id number]  A	N	B	12/06/2023	12/06/2023	clonazepam 0.5 mg tablet	14	14	Azeem Presley	[id number]	Medicaid	[facility][id number]  A	N	B	11/22/2023	11/22/2023	clonazepam 0.5 mg tablet	14	14	Azeem Presley	[id number]	Medicaid	[facility][id number]  A	N	B	08/30/2023	09/02/2023	clonazepam 0.5 mg tablet	14	14	Azeem Presley	[id number]	Medicaid	[id number]  A	N	B	08/16/2023	08/18/2023	clonazepam 0.5 mg tablet	14	14	Azeem Presley	[id number]	Medicaid	[facility][id number]  A	N	B	07/20/2023	07/21/2023	clonazepam 0.5 mg tablet	14	14	Azeem Presley	[id number]	Medicaid	[id number]  A	N	B	06/28/2023	06/28/2023	clonazepam 0.5 mg tablet	14	14	[place] bl[place]	[id number]	Medicaid	[id number]  A	N	B	06/08/2023	06/08/2023	clonazepam 0.5 mg tablet	14	14	[place], Azeem	[id number]	Medicaid	[id number]  A	N	B	05/25/2023	05/25/2023	clonazepam 0.5 mg tablet	14	14	Sakina Azeem	[id number]	Medicaid	[id number]  A	N	B	05/10/2023	05/10/2023	clonazepam 0.5 mg tablet	14	14	Modena, Azeem	[id number]	Medicaid	[id number]  A	N	B	05/05/2023	05/07/2023	lorazepam 0.5 mg tablet	7	7	ModenaAzeem	[id number]	Medicaid	[id number]  A	N	B	04/27/2023	04/28/2023	lorazepam 1 mg tablet	7	7	SakinaAzeem	[id number]	Medicaid	[id number]

## 2023-12-30 NOTE — ED BEHAVIORAL HEALTH ASSESSMENT NOTE - OTHER PAST PSYCHIATRIC HISTORY (INCLUDE DETAILS REGARDING ONSET, COURSE OF ILLNESS, INPATIENT/OUTPATIENT TREATMENT)
as in HPI Patient reports multiple prior IPP stays, Patient reports multiple prior IPP stays and per chart review follows up with provider who orders patient's Klonopin Patient reports multiple prior IPP stays. patient's home medications that patient reported he did not need to take including Klonopin 0.5 mg once daily, Naltrexone 50 mg once daily, Prazosin 1 mg once daily, and Zyprexa 5 mg once daily. Patient follows up with [facility], contact number [phone], Patient reports multiple prior IPP stays. patient's home medications that patient reported he did not need to take including Klonopin 0.5 mg once daily, Naltrexone 50 mg once daily, Prazosin 1 mg once daily, and Zyprexa 5 mg once daily. Patient follows up with [facility], contact number [phone],

## 2023-12-30 NOTE — ED BEHAVIORAL HEALTH ASSESSMENT NOTE - SUMMARY
33 year old single male, domiciled at home in private residence with mother, with PMH of asthma and PPH of Anxiety on Klonopin, and per chart review history of ADHD, possible intellectual disability, and alcohol use disorder and cannabis-induced AH, brought himself into the ED requesting Detox and help getting clean after taking 3 Oxycodone pills he purchased today after feeling distressed about general social distress. Psychiatry was consulted for evaluation if patient's recent drug use was related to suicidality. 33 year old single male, domiciled at home in private residence with mother, with PMH of asthma and PPH of Anxiety on Klonopin, and per chart review history of ADHD, possible intellectual disability, and alcohol use disorder and cannabis-induced AH, brought himself into the ED requesting Detox and help getting clean after taking 3 Oxycodone pills he purchased today after feeling distressed about general social distress. Psychiatry was consulted for evaluation if patient's recent drug use was related to suicidality.    Patient presents as a limited historian, but is consistent about her reports of the events of today, which included in a moment of distress after taking his daily Klonopin and feeling depressed about his psychosocial predicament purchased and ingested three pills believed to be the opiate oxycodone in an attempt to feel better about his current predicament. Patient is adamant that this was not a true suicide attempt and reports that he had never tried to end his life in the past. Patient currently prefers admission for Detox and for resources to stop using substances more than psychiatric admission and patient does not meet criteria for involuntary admission as patient does not display any acute signs of reyna, psychosis, or indication of any current ideation or plan for suicide. While patient's chronic substance use, impulsivity, depressed mood, and history of distant episodes of self cutting, patient is at chronic risk of suicide, patient does not present with any acute signs of suicide risk. Patient does present with signs concerning for benzo/alcohol withdrawal, which remains possible given risk of patient minimizing past drug use, and patient would benefit from medical admission for safe detox and receiving resources from inpatient CATCH team. 33 year old single male, domiciled at home in private residence with mother, with PMH of asthma and PPH of ADHD and ORIN on Klonopin 0.5 mg once daily, Naltrexone 50 mg once daily, Prazosin 1 mg once daily, and Zyprexa 5 mg once daily that patient reported only taking Klonopin, follows up with [facility], contact number [phone],, and per chart review possible intellectual disability and alcohol use disorder and cannabis-induced Auditory Hallucinations, brought himself into the ED requesting Detox and help getting clean after taking 3 Oxycodone pills he purchased today after feeling distressed about general social distress. Psychiatry was consulted for evaluation if patient's recent drug use was related to suicidality.    Patient presents as a limited historian, but is consistent about her reports of the events of today, which included in a moment of distress after taking his daily Klonopin and feeling depressed about his psychosocial predicament purchased and ingested three pills believed to be the opiate oxycodone in an attempt to feel better about his current predicament. Patient is adamant that this was not a true suicide attempt and reports that he had never tried to end his life in the past. Patient currently prefers admission for Detox and for resources to stop using substances more than psychiatric admission and patient does not meet criteria for involuntary admission as patient does not display any acute signs of reyna, psychosis, or indication of any current ideation or plan for suicide. While patient's chronic substance use, impulsivity, depressed mood, and history of distant episodes of self cutting, patient is at chronic risk of suicide, patient does not present with any acute signs of suicide risk. Patient does present with signs concerning for benzo/alcohol withdrawal, which remains possible given risk of patient minimizing past drug use, and patient would benefit from medical admission for safe detox and receiving resources from inpatient CATCH team. 33 year old single male, domiciled at home in private residence with mother, with PMH of asthma and PPH of ADHD and ORIN on Klonopin 0.5 mg once daily, Naltrexone 50 mg once daily, Prazosin 1 mg once daily, and Zyprexa 5 mg once daily that patient reported only taking Klonopin, follows up with [facility], contact number [phone],, and per chart review possible intellectual disability and alcohol use disorder and cannabis-induced Auditory Hallucinations, brought himself into the ED requesting Detox and help getting clean after taking 3 Oxycodone pills he purchased today after feeling distressed about general social distress. Psychiatry was consulted for evaluation if patient's recent drug use was related to suicidality.    Patient presents as a limited historian, but is consistent about her reports of the events of today, which included in a moment of distress after taking his daily Klonopin and feeling depressed about his psychosocial predicament purchased and ingested three pills believed to be the opiate oxycodone in an attempt to feel better about his current predicament. Patient is adamant that this was not a true suicide attempt and reports that he had never tried to end his life in the past. Patient currently prefers admission for Detox and for resources to stop using substances more than psychiatric admission and patient does not meet criteria for involuntary admission as patient does not display any acute signs of reyna, psychosis, or indication of any current ideation or plan for suicide. While patient's chronic substance use, impulsivity, depressed mood, and history of distant episodes of self cutting, patient is at chronic risk of suicide, patient does not present with any acute signs of suicide risk. Patient does present with signs concerning for benzo/alcohol withdrawal, which remains possible given risk of patient minimizing past drug use, and patient would benefit from medical admission for safe detox and receiving resources from inpatient CATCH team.

## 2023-12-30 NOTE — ED BEHAVIORAL HEALTH ASSESSMENT NOTE - RISK ASSESSMENT
Risk Factors (Modifiable): chronic substance use, impulsivity, depressed mood    Risk Factors (Non-Modifiable): history of two distant episodes of self cutting    Protective Factors: Help-seeking, lives with family, established outpatient provider

## 2023-12-31 LAB
ALBUMIN SERPL ELPH-MCNC: 4.5 G/DL — SIGNIFICANT CHANGE UP (ref 3.5–5.2)
ALBUMIN SERPL ELPH-MCNC: 4.5 G/DL — SIGNIFICANT CHANGE UP (ref 3.5–5.2)
ALP SERPL-CCNC: 73 U/L — SIGNIFICANT CHANGE UP (ref 30–115)
ALP SERPL-CCNC: 73 U/L — SIGNIFICANT CHANGE UP (ref 30–115)
ALT FLD-CCNC: 29 U/L — SIGNIFICANT CHANGE UP (ref 0–41)
ALT FLD-CCNC: 29 U/L — SIGNIFICANT CHANGE UP (ref 0–41)
ANION GAP SERPL CALC-SCNC: 12 MMOL/L — SIGNIFICANT CHANGE UP (ref 7–14)
ANION GAP SERPL CALC-SCNC: 12 MMOL/L — SIGNIFICANT CHANGE UP (ref 7–14)
APPEARANCE UR: CLEAR — SIGNIFICANT CHANGE UP
APPEARANCE UR: CLEAR — SIGNIFICANT CHANGE UP
AST SERPL-CCNC: 27 U/L — SIGNIFICANT CHANGE UP (ref 0–41)
AST SERPL-CCNC: 27 U/L — SIGNIFICANT CHANGE UP (ref 0–41)
BILIRUB SERPL-MCNC: 0.5 MG/DL — SIGNIFICANT CHANGE UP (ref 0.2–1.2)
BILIRUB SERPL-MCNC: 0.5 MG/DL — SIGNIFICANT CHANGE UP (ref 0.2–1.2)
BILIRUB UR-MCNC: NEGATIVE — SIGNIFICANT CHANGE UP
BILIRUB UR-MCNC: NEGATIVE — SIGNIFICANT CHANGE UP
BUN SERPL-MCNC: 13 MG/DL — SIGNIFICANT CHANGE UP (ref 10–20)
BUN SERPL-MCNC: 13 MG/DL — SIGNIFICANT CHANGE UP (ref 10–20)
CALCIUM SERPL-MCNC: 9 MG/DL — SIGNIFICANT CHANGE UP (ref 8.4–10.5)
CALCIUM SERPL-MCNC: 9 MG/DL — SIGNIFICANT CHANGE UP (ref 8.4–10.5)
CHLORIDE SERPL-SCNC: 105 MMOL/L — SIGNIFICANT CHANGE UP (ref 98–110)
CHLORIDE SERPL-SCNC: 105 MMOL/L — SIGNIFICANT CHANGE UP (ref 98–110)
CO2 SERPL-SCNC: 24 MMOL/L — SIGNIFICANT CHANGE UP (ref 17–32)
CO2 SERPL-SCNC: 24 MMOL/L — SIGNIFICANT CHANGE UP (ref 17–32)
COLOR SPEC: YELLOW — SIGNIFICANT CHANGE UP
COLOR SPEC: YELLOW — SIGNIFICANT CHANGE UP
CREAT SERPL-MCNC: 0.8 MG/DL — SIGNIFICANT CHANGE UP (ref 0.7–1.5)
CREAT SERPL-MCNC: 0.8 MG/DL — SIGNIFICANT CHANGE UP (ref 0.7–1.5)
DIFF PNL FLD: NEGATIVE — SIGNIFICANT CHANGE UP
DIFF PNL FLD: NEGATIVE — SIGNIFICANT CHANGE UP
EGFR: 120 ML/MIN/1.73M2 — SIGNIFICANT CHANGE UP
EGFR: 120 ML/MIN/1.73M2 — SIGNIFICANT CHANGE UP
GLUCOSE SERPL-MCNC: 115 MG/DL — HIGH (ref 70–99)
GLUCOSE SERPL-MCNC: 115 MG/DL — HIGH (ref 70–99)
GLUCOSE UR QL: NEGATIVE MG/DL — SIGNIFICANT CHANGE UP
GLUCOSE UR QL: NEGATIVE MG/DL — SIGNIFICANT CHANGE UP
HCT VFR BLD CALC: 43.7 % — SIGNIFICANT CHANGE UP (ref 42–52)
HCT VFR BLD CALC: 43.7 % — SIGNIFICANT CHANGE UP (ref 42–52)
HGB BLD-MCNC: 15 G/DL — SIGNIFICANT CHANGE UP (ref 14–18)
HGB BLD-MCNC: 15 G/DL — SIGNIFICANT CHANGE UP (ref 14–18)
KETONES UR-MCNC: ABNORMAL MG/DL
KETONES UR-MCNC: ABNORMAL MG/DL
LEUKOCYTE ESTERASE UR-ACNC: NEGATIVE — SIGNIFICANT CHANGE UP
LEUKOCYTE ESTERASE UR-ACNC: NEGATIVE — SIGNIFICANT CHANGE UP
MAGNESIUM SERPL-MCNC: 2.3 MG/DL — SIGNIFICANT CHANGE UP (ref 1.8–2.4)
MAGNESIUM SERPL-MCNC: 2.3 MG/DL — SIGNIFICANT CHANGE UP (ref 1.8–2.4)
MCHC RBC-ENTMCNC: 31.5 PG — HIGH (ref 27–31)
MCHC RBC-ENTMCNC: 31.5 PG — HIGH (ref 27–31)
MCHC RBC-ENTMCNC: 34.3 G/DL — SIGNIFICANT CHANGE UP (ref 32–37)
MCHC RBC-ENTMCNC: 34.3 G/DL — SIGNIFICANT CHANGE UP (ref 32–37)
MCV RBC AUTO: 91.8 FL — SIGNIFICANT CHANGE UP (ref 80–94)
MCV RBC AUTO: 91.8 FL — SIGNIFICANT CHANGE UP (ref 80–94)
NITRITE UR-MCNC: NEGATIVE — SIGNIFICANT CHANGE UP
NITRITE UR-MCNC: NEGATIVE — SIGNIFICANT CHANGE UP
NRBC # BLD: 0 /100 WBCS — SIGNIFICANT CHANGE UP (ref 0–0)
NRBC # BLD: 0 /100 WBCS — SIGNIFICANT CHANGE UP (ref 0–0)
PH UR: 5.5 — SIGNIFICANT CHANGE UP (ref 5–8)
PH UR: 5.5 — SIGNIFICANT CHANGE UP (ref 5–8)
PHOSPHATE SERPL-MCNC: 2.9 MG/DL — SIGNIFICANT CHANGE UP (ref 2.1–4.9)
PHOSPHATE SERPL-MCNC: 2.9 MG/DL — SIGNIFICANT CHANGE UP (ref 2.1–4.9)
PLATELET # BLD AUTO: 337 K/UL — SIGNIFICANT CHANGE UP (ref 130–400)
PLATELET # BLD AUTO: 337 K/UL — SIGNIFICANT CHANGE UP (ref 130–400)
PMV BLD: 9.6 FL — SIGNIFICANT CHANGE UP (ref 7.4–10.4)
PMV BLD: 9.6 FL — SIGNIFICANT CHANGE UP (ref 7.4–10.4)
POTASSIUM SERPL-MCNC: 3.9 MMOL/L — SIGNIFICANT CHANGE UP (ref 3.5–5)
POTASSIUM SERPL-MCNC: 3.9 MMOL/L — SIGNIFICANT CHANGE UP (ref 3.5–5)
POTASSIUM SERPL-SCNC: 3.9 MMOL/L — SIGNIFICANT CHANGE UP (ref 3.5–5)
POTASSIUM SERPL-SCNC: 3.9 MMOL/L — SIGNIFICANT CHANGE UP (ref 3.5–5)
PROT SERPL-MCNC: 6.9 G/DL — SIGNIFICANT CHANGE UP (ref 6–8)
PROT SERPL-MCNC: 6.9 G/DL — SIGNIFICANT CHANGE UP (ref 6–8)
PROT UR-MCNC: NEGATIVE MG/DL — SIGNIFICANT CHANGE UP
PROT UR-MCNC: NEGATIVE MG/DL — SIGNIFICANT CHANGE UP
RBC # BLD: 4.76 M/UL — SIGNIFICANT CHANGE UP (ref 4.7–6.1)
RBC # BLD: 4.76 M/UL — SIGNIFICANT CHANGE UP (ref 4.7–6.1)
RBC # FLD: 12.3 % — SIGNIFICANT CHANGE UP (ref 11.5–14.5)
RBC # FLD: 12.3 % — SIGNIFICANT CHANGE UP (ref 11.5–14.5)
SODIUM SERPL-SCNC: 141 MMOL/L — SIGNIFICANT CHANGE UP (ref 135–146)
SODIUM SERPL-SCNC: 141 MMOL/L — SIGNIFICANT CHANGE UP (ref 135–146)
SP GR SPEC: >1.03 — HIGH (ref 1–1.03)
SP GR SPEC: >1.03 — HIGH (ref 1–1.03)
UROBILINOGEN FLD QL: 1 MG/DL — SIGNIFICANT CHANGE UP (ref 0.2–1)
UROBILINOGEN FLD QL: 1 MG/DL — SIGNIFICANT CHANGE UP (ref 0.2–1)
WBC # BLD: 6.56 K/UL — SIGNIFICANT CHANGE UP (ref 4.8–10.8)
WBC # BLD: 6.56 K/UL — SIGNIFICANT CHANGE UP (ref 4.8–10.8)
WBC # FLD AUTO: 6.56 K/UL — SIGNIFICANT CHANGE UP (ref 4.8–10.8)
WBC # FLD AUTO: 6.56 K/UL — SIGNIFICANT CHANGE UP (ref 4.8–10.8)

## 2023-12-31 PROCEDURE — 99232 SBSQ HOSP IP/OBS MODERATE 35: CPT

## 2023-12-31 RX ORDER — INFLUENZA VIRUS VACCINE 15; 15; 15; 15 UG/.5ML; UG/.5ML; UG/.5ML; UG/.5ML
0.5 SUSPENSION INTRAMUSCULAR ONCE
Refills: 0 | Status: DISCONTINUED | OUTPATIENT
Start: 2023-12-31 | End: 2024-01-03

## 2023-12-31 RX ADMIN — Medication 1 MILLIGRAM(S): at 11:24

## 2023-12-31 RX ADMIN — Medication 1 TABLET(S): at 11:24

## 2023-12-31 RX ADMIN — NALTREXONE HYDROCHLORIDE 50 MILLIGRAM(S): 50 TABLET, FILM COATED ORAL at 11:25

## 2023-12-31 RX ADMIN — Medication 50 MILLIGRAM(S): at 22:00

## 2023-12-31 RX ADMIN — Medication 100 MILLIGRAM(S): at 11:24

## 2023-12-31 RX ADMIN — Medication 50 MILLIGRAM(S): at 11:23

## 2023-12-31 RX ADMIN — Medication 50 MILLIGRAM(S): at 00:29

## 2023-12-31 RX ADMIN — OLANZAPINE 5 MILLIGRAM(S): 15 TABLET, FILM COATED ORAL at 11:25

## 2023-12-31 NOTE — PATIENT PROFILE ADULT - VISION (WITH CORRECTIVE LENSES IF THE PATIENT USUALLY WEARS THEM):
Corneal Transplant/Normal vision: sees adequately in most situations; can see medication labels, newsprint

## 2023-12-31 NOTE — BH CONSULTATION LIAISON PROGRESS NOTE - OTHER
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

## 2023-12-31 NOTE — PATIENT PROFILE ADULT - FUNCTIONAL ASSESSMENT - DAILY ACTIVITY SCORE.
Niraj Kimble,  I received a PA for this patients Metoprolol 37.5 MG dose  Upon chart review Rx SIG states:  metoprolol tartrate 37.5 MG Tab   7/22/2021     Sig - Route: Take 25 mg by mouth 2 times a day. - Oral    Class: Historical Med    Notes to Pharmacy: Current rx sent to [facility] previously from initial prescription    [facility] [place] [facility] [address] [facility]     How much is the patient supposed to take? The Rx is for 37.5MG but the SIG states 25MG BID    Also what is the dispensing quantity?   
Other (PAR Metoprolol )  Thu Tobias, Med Ass't  You; Aiyana Rosales 58 minutes ago (11:24 AM)     Called and spoke with patients wife, Cydney. She states pt does have medication and they plan to go  refills tomorrow. Asked Cydney to please call me back if she has any trouble picking up med. Cydney tells me that she already spoke with the pharmacist regarding the 37.5MG dose metoprolol and pharmacist stated med would be available tomorrow.     Message text      Noted findings.  
Reviewed telephone note signed by this RN    Findings relayed to EBENEZER PETERS.  
24

## 2023-12-31 NOTE — BH CONSULTATION LIAISON PROGRESS NOTE - NSBHCHARTREVIEWINVESTIGATE_PSY_A_CORE FT
< from: 12 Lead ECG (12.30.23 @ 10:07) >    Ventricular Rate 74 BPM    Atrial Rate 74 BPM    P-R Interval 130 ms    QRS Duration 86 ms    Q-T Interval 438 ms    QTC Calculation(Bazett) 486 ms    P Axis 46 degrees    R Axis 67 degrees    T Axis 69 degrees    Diagnosis Line Normal sinus rhythm  Nonspecific T wave abnormality        Confirmed by KOBY ORDOÑEZ MD ([id number]) on 12/30/2023 10:12:45 AM    < end of copied text >

## 2023-12-31 NOTE — BH CONSULTATION LIAISON PROGRESS NOTE - NSBHCHARTREVIEWLAB_PSY_A_CORE FT
15.0   6.56  )-----------( 337      ( 31 Dec 2023 08:22 )             43.7     12-31    141  |  105  |  13  ----------------------------<  115<H>  3.9   |  24  |  0.8    Ca    9.0      31 Dec 2023 08:22  Phos  2.9     12-31  Mg     2.3     12-31    TPro  6.9  /  Alb  4.5  /  TBili  0.5  /  DBili  x   /  AST  27  /  ALT  29  /  AlkPhos  73  12-31

## 2023-12-31 NOTE — BH CONSULTATION LIAISON PROGRESS NOTE - NSBHFUPINTERVALHXFT_PSY_A_CORE
Chart reviewed. Patient has been receiving Librium taper since admitted from ED. Per nursing report, patient had hidden a kitchen knife and razor blades under his bed for which security was asked to come in and remove the contraband from the room.    Upon approach, patient was seen standing in the room appearing anxious. Patient remains alert and oriented x3 but still cannot recall any current events. Patient appeared uncomfortable but besides observed anxiety and agitation CIWA 9 with anxiety rank of 4 and agitation rank of 5. Patient reported that the Librium makes him sleepy but the Klonopin makes him calmer. He asked if he could be given Klonopin instead of Librium while he is admitted. Patient was guarded on interview, not discussing the sharps in his room until the police in the room was acknowledged. Patient states that he has no thoughts about wanting to hurt himself or others. He reported that he was stock-piling the sharps to defend himself when he leaves the hospital. Patient reports being safe in the hospital but reports that people outside are trying to get him, but patient declined to expand on who is after him. Patient superficially denies any current anxiety or depression, or suicidal ideation. Patient was notably anxious but not internally preoccupied. Patient reported that he wished to leave the hospital soon so he can use more Klonopin and Cannabis, reporting that he needs both of them to remain calm. Patient reported that he was willing to stay to complete the Librium taper for a couple days but did not plan to stay for any longer after that.  Chart reviewed. Patient has been receiving Librium taper since admitted from ED. Per nursing report, patient had hidden a kitchen knife and razor blades under his bed for which security was asked to come in and remove the contraband from the room.    Upon approach, patient was seen standing in the room appearing anxious. Patient remains alert and oriented x3 but still cannot recall any current events. Patient appeared uncomfortable but besides observed anxiety and agitation CIWA 7 with anxiety rank of 3 and agitation rank of 4. Patient reported that the Librium makes him sleepy but the Klonopin makes him calmer. He asked if he could be given Klonopin instead of Librium while he is admitted. Patient was guarded on interview, not discussing the sharps in his room until the police in the room was acknowledged. Patient states that he has no thoughts about wanting to hurt himself or others. He reported that he was stock-piling the sharps to defend himself when he leaves the hospital. Patient reports being safe in the hospital but reports that people outside are trying to get him, but patient declined to expand on who is after him. Patient superficially denies any current anxiety or depression, or suicidal ideation. Patient was notably anxious but not internally preoccupied. Patient reported that he wished to leave the hospital soon so he can use more Klonopin and Cannabis, reporting that he needs both of them to remain calm. Patient reported that he was willing to stay to complete the Librium taper for a couple days but did not plan to stay for any longer after that.

## 2023-12-31 NOTE — PATIENT PROFILE ADULT - FALL HARM RISK - UNIVERSAL INTERVENTIONS
Bed in lowest position, wheels locked, appropriate side rails in place/Call bell, personal items and telephone in reach/Instruct patient to call for assistance before getting out of bed or chair/Non-slip footwear when patient is out of bed/[place] to call system/Physically safe environment - no spills, clutter or unnecessary equipment/Purposeful Proactive Rounding/Room/bathroom lighting operational, light cord in reach Bed in lowest position, wheels locked, appropriate side rails in place/Call bell, personal items and telephone in reach/Instruct patient to call for assistance before getting out of bed or chair/Non-slip footwear when patient is out of bed/[place] to call system/Physically safe environment - no spills, clutter or unnecessary equipment/Purposeful Proactive Rounding/Room/bathroom lighting operational, light cord in reach

## 2023-12-31 NOTE — PROGRESS NOTE ADULT - SUBJECTIVE AND OBJECTIVE BOX
SUBJECTIVE:    Patient is a 33y old Male who presents with a chief complaint of benzo withdrawal (31 Dec 2023 12:11)    Currently admitted to medicine with the primary diagnosis of Benzodiazepine withdrawal without complication       Today is hospital day 1d.  Pt had 1 :1 ordered. Had knife recovered ? psych at bed side     PAST MEDICAL & SURGICAL HISTORY  Anxiety    ADHD    Anxiety    Alcohol abuse    Depression    Asthma    History of corneal transplant      SOCIAL HISTORY:  Negative for smoking/alcohol/drug use.     ALLERGIES:  penicillins (Unknown)    MEDICATIONS:  STANDING MEDICATIONS  chlordiazePOXIDE   Oral   chlordiazePOXIDE 50 milliGRAM(s) Oral every 8 hours  enoxaparin Injectable 40 milliGRAM(s) SubCutaneous every 24 hours  folic acid 1 milliGRAM(s) Oral daily  influenza   Vaccine 0.5 milliLiter(s) IntraMuscular once  multivitamin 1 Tablet(s) Oral daily  naltrexone 50 milliGRAM(s) Oral daily  OLANZapine 5 milliGRAM(s) Oral daily  thiamine 100 milliGRAM(s) Oral daily    PRN MEDICATIONS  calamine/zinc oxide Lotion 1 Application(s) Topical two times a day PRN    VITALS:   T(F): 97.6  HR: 51  BP: 141/69  RR: 18  SpO2: 98%    PHYSICAL EXAM:  GEN: No acute distress  LUNGS: Clear to auscultation bilaterally   HEART: S1/S2 present.  ABD: Soft, non-tender, non-distended. Bowel sounds present        LABS:                        15.0   6.56  )-----------( 337      ( 31 Dec 2023 08:22 )             43.7     12-31    141  |  105  |  13  ----------------------------<  115<H>  3.9   |  24  |  0.8    Ca    9.0      31 Dec 2023 08:22  Phos  2.9     12-31  Mg     2.3     12-31    TPro  6.9  /  Alb  4.5  /  TBili  0.5  /  DBili  x   /  AST  27  /  ALT  29  /  AlkPhos  73  12-31      Urinalysis Basic - ( 31 Dec 2023 08:22 )    Color: x / Appearance: x / SG: x / pH: x  Gluc: 115 mg/dL / Ketone: x  / Bili: x / Urobili: x   Blood: x / Protein: x / Nitrite: x   Leuk Esterase: x / RBC: x / WBC x   Sq Epi: x / Non Sq Epi: x / Bacteria: x                    RADIOLOGY:

## 2023-12-31 NOTE — BH CONSULTATION LIAISON PROGRESS NOTE - NSBHASSESSMENTFT_PSY_ALL_CORE
33 year old single male, domiciled at home in private residence with mother, with PMH of asthma and PPH of ADHD and ORIN on Klonopin 0.5 mg once daily, Naltrexone 50 mg once daily, Prazosin 1 mg once daily, and Zyprexa 5 mg once daily that patient reported only taking Klonopin, follows up with [facility], contact number [phone],, and per chart review possible intellectual disability and alcohol use disorder and cannabis-induced Auditory Hallucinations, brought himself into the ED requesting Detox and help getting clean after taking 3 Oxycodone pills he purchased today after feeling distressed about general social distress. Psychiatry was originally consulted for evaluation if patient's recent drug use was related to suicidality. Follow up for recommendations following patient's continued taper.     33 year old single male, domiciled at home in private residence with mother, with PMH of asthma and PPH of ADHD and ORIN on Klonopin 0.5 mg once daily, Naltrexone 50 mg once daily, Prazosin 1 mg once daily, and Zyprexa 5 mg once daily that patient reported only taking Klonopin, follows up with [facility], contact number [phone],, and per chart review possible intellectual disability and alcohol use disorder and cannabis-induced Auditory Hallucinations, brought himself into the ED requesting Detox and help getting clean after taking 3 Oxycodone pills he purchased today after feeling distressed about general social distress. Psychiatry was originally consulted for evaluation if patient's recent drug use was related to suicidality. Follow up for recommendations following patient's continued taper.     33 year old single male, domiciled at home in private residence with mother, with PMH of asthma and PPH of ADHD and ORIN on Klonopin 0.5 mg once daily, Naltrexone 50 mg once daily, Prazosin 1 mg once daily, and Zyprexa 7.5 mg once daily that patient reported only taking Klonopin, follows up with [facility], contact number [phone],, and per chart review possible intellectual disability and alcohol use disorder and cannabis-induced Auditory Hallucinations, brought himself into the ED requesting Detox and help getting clean after taking 3 Oxycodone pills he purchased today after feeling distressed about general social distress. Psychiatry was originally consulted for evaluation if patient's recent drug use was related to suicidality. Follow up for recommendations following patient's continued taper.    Patient presents with continued anxiety and agitation on exam, likely secondary to his history of anxiety and frustration of being on a Librium taper instead of his previous Klonopin medication, in addition to his continuing withdrawal from benzodiazepines. Patient does appear more linear and less confused compared to last evaluation with patient's blood pressure improved since he was in the ED. Patient was found hoarding sharps and knives in his room for which security was brought in. Patient does have history of impulsive activity, also complicated by patient's history of possible intellectual disability. Patient self reports that storing the knives was related to history of protectiveness from staying within the shelter program, which was recorded in chart review. Patient consistently and in linear though concrete fashion denies any thoughts of wanting to harm himself or others. He reports the knives were a mistake and apologized for the transgression. Patient also denied any signs of depression or psychosis and patient was not internally preoccupied. Patient remains at low acute risk of suicide and does not require psychiatric 1:1 or meet criteria for involuntary admission. No psychiatric contraindications to discharge. Patient was encouraged to remain to complete taper and then speak to [facility] social workers, which patient superficially was agreeable to. Patient should continue on current Librium taper and resume home psychiatric medications. Patient can follow up with established outpatient psychiatrist.    Mother from admission requested updates if patient wishes to leave. Patient gave verbal consent to speak with mother. Patient's outpatient contacted twice to no response, voicemail with callback number left.    #Benzodiazapine Use Disorder, current withdrawal  #Cannabis Use Disorder (of note, patient has history of cannabis induced psychosis, last cannabis use reported to be 12/29)  # Hx of alcohol use, last drink 12/24  - Recommend continuing Librium taper  - Recommend Atarax PO 50 mg Q6H PRN for breakthrough anxiety/insomnia  - Recommend continuing folic acid/ thiamine/ multivitamin  - Follow up Urine Drug Screen    #ORIN  #ADHD  - Recommend continuing Zyprexa PO 7.5 mg once daily at bedtime, home dose confirmed with pharmacy  - Recommend continuing home medication Naltrexone 50 mg once daily  - Recommend holding home medication Prazosin 1 mg once daily until patient's blood pressure stabilized    #Agitation  - Recommend Atarax PO 50 mg Q6H PRN for breakthrough anxiety/insomnia  - Patient can receive Zyprexa PO 5 mg Q8H for agitation. Only if patient is at severe risk of harm to self or others and not amendable to verbal redirection or PO medication, can escalate to Haldol 5 mg IM; if IM formulation used please order repeat EKG to ensure qtc <500ms  - As patient is on Benzodiazapine taper, recommend avoiding Zyprexa IM for episodes of acute agitation 33 year old single male, domiciled at home in private residence with mother, with PMH of asthma and PPH of ADHD and ORIN on Klonopin 0.5 mg once daily, Naltrexone 50 mg once daily, Prazosin 1 mg once daily, and Zyprexa 7.5 mg once daily that patient reported only taking Klonopin, follows up with [facility], contact number [phone],, and per chart review possible intellectual disability and alcohol use disorder and cannabis-induced Auditory Hallucinations, brought himself into the ED requesting Detox and help getting clean after taking 3 Oxycodone pills he purchased today after feeling distressed about general social distress. Psychiatry was originally consulted for evaluation if patient's recent drug use was related to suicidality. Follow up for recommendations following patient's continued taper.    Patient presents with continued anxiety and agitation on exam, likely secondary to his history of anxiety and frustration of being on a Librium taper instead of his previous Klonopin medication, in addition to his continuing withdrawal from benzodiazepines. Patient does appear more linear and less confused compared to last evaluation with patient's blood pressure improved since he was in the ED. Patient was found hoarding sharps and knives in his room for which security was brought in. Patient does have history of impulsive activity, also complicated by patient's history of possible intellectual disability. Patient self reports that storing the knives was related to history of protectiveness from staying within the shelter program, which was recorded in chart review. Patient consistently and in linear though concrete fashion denies any thoughts of wanting to harm himself or others. He reports the knives were a mistake and apologized for the transgression. Patient also denied any signs of depression or psychosis and patient was not internally preoccupied. Patient remains at low acute risk of suicide and does not require psychiatric 1:1 or meet criteria for involuntary admission. No psychiatric contraindications to discharge. Patient was encouraged to remain to complete taper and then speak to [facility] social workers, which patient superficially was agreeable to. Patient should continue on current Librium taper and resume home psychiatric medications. Patient can follow up with established outpatient psychiatrist.    Mother from admission requested updates if patient wishes to leave. Patient gave verbal consent to speak with mother. Patient's outpatient contacted twice to no response, voicemail with callback number left.    #Benzodiazapine Use Disorder, current withdrawal  #Cannabis Use Disorder (of note, patient has history of cannabis induced psychosis, last cannabis use reported to be 12/29)  # Hx of alcohol use, last drink 12/24  - Recommend continuing Librium taper  - Recommend Atarax PO 50 mg Q6H PRN for breakthrough anxiety/insomnia  - Recommend continuing folic acid/ thiamine/ multivitamin  - Follow up Urine Drug Screen    #ORIN  #ADHD  - Recommend continuing Zyprexa PO 7.5 mg once daily at bedtime, home dose confirmed with pharmacy  - Recommend continuing home medication Naltrexone 50 mg once daily  - Recommend holding home medication Prazosin 1 mg once daily until patient's blood pressure stabilized    #Agitation  - Recommend Atarax PO 50 mg Q6H PRN for breakthrough anxiety/insomnia  - Patient can receive Zyprexa PO 5 mg Q8H for agitation. Only if patient is at severe risk of harm to self or others and not amendable to verbal redirection or PO medication, can escalate to Haldol 5 mg IM; if IM formulation used please order repeat EKG to ensure qtc <500ms  - As patient is on Benzodiazapine taper, recommend avoiding Zyprexa IM for episodes of acute agitation

## 2023-12-31 NOTE — CONSULT NOTE ADULT - SUBJECTIVE AND OBJECTIVE BOX
Patient was seen by  Psychiatry. See full  Consult Follow up note 12/31 for full evaluation and recommendations.     Recommendations from above note:  #Benzodiazapine Use Disorder, current withdrawal  #Cannabis Use Disorder (of note, patient has history of cannabis induced psychosis, last cannabis use reported to be 12/29)  # Hx of alcohol use, last drink 12/24  - Recommend continuing Librium taper  - Recommend Atarax PO 50 mg Q6H PRN for breakthrough anxiety/insomnia  - Recommend continuing folic acid/ thiamine/ multivitamin  - Follow up Urine Drug Screen

## 2023-12-31 NOTE — PROGRESS NOTE ADULT - ASSESSMENT
33-year-old male with PMH ADHD, ORIN, alcohol use disorder presenting after taking 3 tablets of OxyContin this morning      #Benzodiazepine withdrawal  #Hx of alcohol abuse  #ORIN, ADHD  - ran out of klonopin; says he took 3 oxycontin, not in an attempt to hurt himself but for anxiety b/c he says the klonopin doesn't help  - -CHYNA 5   - seen by behavioral health, made recommendations regarding medications  - no reported hx of seizures by pts  and pts mother  - would continue outpatient naltrexone 50mg daily dose  - librium taper  - thiamine/folate/ multivitamin  - keep K>4, Mg>2  - tox  - CATCH/addiction medicine consult ordered and following  - unclear why pt on prazosin (pt and pts mother doesn't know why)  - pt receives medications from [facility] ([phone])  - Knife recovered from pt , addiction medicine aware  pt remains 1:1      #Pruritis, likely contact dermatitis  - says he may have come into contact with poison ivy while he went messing for the past 2 days  - well demarcated erythema on neck extending to clavicles, dry  - no fever, wbc  - calamine lotion    Will remain in hospital until detox 33-year-old male with PMH ADHD, ORIN, alcohol use disorder presenting after taking 3 tablets of OxyContin this morning      #Benzodiazepine withdrawal  #Hx of alcohol abuse  #ORIN, ADHD  - ran out of klonopin; says he took 3 oxycontin, not in an attempt to hurt himself but for anxiety b/c he says the klonopin doesn't help  - -CHYNA 5   - seen by behavioral health, made recommendations regarding medications  - no reported hx of seizures by pts  and pts mother  - would continue outpatient naltrexone 50mg daily dose  - librium taper  - thiamine/folate/ multivitamin  - keep K>4, Mg>2  - tox  - CATCH/addiction medicine consult ordered and following  - unclear why pt on prazosin (pt and pts mother doesn't know why)  - pt receives medications from [facility] ([phone])  - Knife recovered from pt , addiction medicine aware  pt remains 1:1      #Pruritis, likely contact dermatitis  - says he may have come into contact with poison ivy while he went messing for the past 2 days  - well demarcated erythema on neck extending to clavicles, dry  - no fever, wbc  - calamine lotion    Will remain in hospital until detox

## 2024-01-01 LAB
FOLATE SERPL-MCNC: 18 NG/ML — SIGNIFICANT CHANGE UP
FOLATE SERPL-MCNC: 18 NG/ML — SIGNIFICANT CHANGE UP

## 2024-01-01 PROCEDURE — 99232 SBSQ HOSP IP/OBS MODERATE 35: CPT

## 2024-01-01 RX ORDER — PANTOPRAZOLE SODIUM 20 MG/1
40 TABLET, DELAYED RELEASE ORAL
Refills: 0 | Status: DISCONTINUED | OUTPATIENT
Start: 2024-01-01 | End: 2024-01-03

## 2024-01-01 RX ORDER — HYDROXYZINE HCL 10 MG
50 TABLET ORAL EVERY 6 HOURS
Refills: 0 | Status: DISCONTINUED | OUTPATIENT
Start: 2024-01-01 | End: 2024-01-03

## 2024-01-01 RX ADMIN — Medication 50 MILLIGRAM(S): at 05:18

## 2024-01-01 RX ADMIN — Medication 100 MILLIGRAM(S): at 12:08

## 2024-01-01 RX ADMIN — Medication 50 MILLIGRAM(S): at 23:03

## 2024-01-01 RX ADMIN — PANTOPRAZOLE SODIUM 40 MILLIGRAM(S): 20 TABLET, DELAYED RELEASE ORAL at 06:14

## 2024-01-01 RX ADMIN — Medication 1 MILLIGRAM(S): at 12:08

## 2024-01-01 RX ADMIN — Medication 1 TABLET(S): at 12:08

## 2024-01-01 RX ADMIN — NALTREXONE HYDROCHLORIDE 50 MILLIGRAM(S): 50 TABLET, FILM COATED ORAL at 12:08

## 2024-01-01 RX ADMIN — OLANZAPINE 5 MILLIGRAM(S): 15 TABLET, FILM COATED ORAL at 12:08

## 2024-01-01 NOTE — PROGRESS NOTE ADULT - ASSESSMENT
33-year-old male with PMH ADHD, ORIN, alcohol use disorder presenting after taking 3 tablets of OxyContin this morning. Currently HDS, afebrile and satting well on RA.       #Benzodiazepine withdrawal  #Hx of alcohol abuse  #ORIN, ADHD  - ran out of klonopin; says he took 3 oxycontin, not in an attempt to hurt himself but for anxiety b/c he says the klonopin doesn't help  - CIWA  of 1 this AM.   - seen by behavioral health, made recommendations regarding medications  - no reported hx of seizures by pts  and pts mother  - would continue outpatient naltrexone 50mg daily dose  - librium taper  - thiamine/folate/ multivitamin  - keep K>4, Mg>2  - tox  - CATCH/addiction medicine consult ordered and following  - unclear why pt on prazosin (pt and pts mother doesn't know why)  - pt receives medications from [facility] ([phone])  - Knife recovered from pt , addiction medicine aware  - pt remains 1:1  - Psych c/s          Recommend Atarax PO 50 mg Q6H PRN for breakthrough anxiety/insomnia          Patient can receive Zyprexa PO 5 mg Q8H for agitation. Only if patient is at severe risk of harm to self or others and not amendable to verbal redirection or PO medication, can                     escalate to Haldol 5 mg IM; if IM formulation used please order repeat EKG to ensure qtc <500ms          As patient is on Benzodiazapine taper, recommend avoiding Zyprexa IM for episodes of acute agitation          Recommend continuing Librium taper          Recommend Atarax PO 50 mg Q6H PRN for breakthrough anxiety/insomnia      #Pruritis, likely contact dermatitis  - says he may have come into contact with poison ivy while he went messing for the past 2 days  - well demarcated erythema on neck extending to clavicles, dry  - no fever, wbc  - calamine lotion    #Progress Note Handoff  Pending (specify):  Consults_________, Tests________, Test Results_______, Other_________  Family discussion:  Disposition: Home___/SNF___/Other________/Unknown at this time________ 33-year-old male with PMH ADHD, ORIN, alcohol use disorder presenting after taking 3 tablets of OxyContin this morning. Currently HDS, afebrile and satting well on RA.       #Benzodiazepine withdrawal  #Hx of alcohol abuse  #ORIN, ADHD  - ran out of klonopin; says he took 3 oxycontin, not in an attempt to hurt himself but for anxiety b/c he says the klonopin doesn't help  - CIWA  of 1 this AM.   - seen by behavioral health, made recommendations regarding medications  - no reported hx of seizures by pts  and pts mother  - would continue outpatient naltrexone 50mg daily dose  - librium taper  - thiamine/folate/ multivitamin  - keep K>4, Mg>2  - tox  - CATCH/addiction medicine consult ordered and following  - unclear why pt on prazosin (pt and pts mother doesn't know why)  - pt receives medications from [facility] ([phone])  - Knife recovered from pt , addiction medicine aware  - pt remains 1:1  - Psych c/s          Recommend Atarax PO 50 mg Q6H PRN for breakthrough anxiety/insomnia          Patient can receive Zyprexa PO 5 mg Q8H for agitation. Only if patient is at severe risk of harm to self or others and not amendable to verbal redirection or PO medication, can                     escalate to Haldol 5 mg IM; if IM formulation used please order repeat EKG to ensure qtc <500ms          As patient is on Benzodiazapine taper, recommend avoiding Zyprexa IM for episodes of acute agitation          Recommend continuing Librium taper          Recommend Atarax PO 50 mg Q6H PRN for breakthrough anxiety/insomnia      #Pruritis, likely contact dermatitis  - says he may have come into contact with poison ivy while he went messing for the past 2 days  - well demarcated erythema on neck extending to clavicles, dry  - no fever, wbc  - calamine lotion    #Progress Note Handoff  Pending (specify):  Consults_________, Tests________, Test Results_______, Other_________  Family discussion:  Disposition: Home___/SNF___/Other________/Unknown at this time________ 33-year-old male with PMH ADHD, ORIN, alcohol use disorder presenting after taking 3 tablets of OxyContin this morning. Currently HDS, afebrile and satting well on RA.       #Benzodiazepine withdrawal  #Hx of alcohol abuse  #ORIN, ADHD  - ran out of klonopin; says he took 3 oxycontin, not in an attempt to hurt himself but for anxiety b/c he says the klonopin doesn't help  - CIWA  of 1 this AM.   - seen by behavioral health, made recommendations regarding medications  - no reported hx of seizures by pts  and pts mother  - would continue outpatient naltrexone 50mg daily dose  - librium taper  - thiamine/folate/ multivitamin  - keep K>4, Mg>2  - tox  - CATCH/addiction medicine consult ordered and following  - unclear why pt on prazosin (pt and pts mother doesn't know why)  - pt receives medications from [facility] ([phone])  - Knife recovered from pt , addiction medicine aware  - pt remains 1:1  - Psych c/s          Recommend Atarax PO 50 mg Q6H PRN for breakthrough anxiety/insomnia          Patient can receive Zyprexa PO 5 mg Q8H for agitation. Only if patient is at severe risk of harm to self or others and not amendable to verbal redirection or PO medication, can                     escalate to Haldol 5 mg IM; if IM formulation used please order repeat EKG to ensure qtc <500ms          As patient is on Benzodiazapine taper, recommend avoiding Zyprexa IM for episodes of acute agitation          Recommend continuing Librium taper          Recommend Atarax PO 50 mg Q6H PRN for breakthrough anxiety/insomnia      #Pruritis, likely contact dermatitis  - says he may have come into contact with poison ivy while he went messing for the past 2 days  - well demarcated erythema on neck extending to clavicles, dry  - no fever, wbc  - calamine lotion    #Progress Note Handoff  Pending (specify):  psych follow up, librium taper, dispo planning   Family discussion: updated  Disposition:  Unknown at this time________ 33-year-old male with PMH ADHD, ORIN, alcohol use disorder presenting after taking 3 tablets of OxyContin this morning. Currently HDS, afebrile and satting well on RA.       #Benzodiazepine withdrawal  #Hx of alcohol abuse  #ORIN, ADHD  - ran out of klonopin; says he took 3 oxycontin, not in an attempt to hurt himself but for anxiety b/c he says the klonopin doesn't help  - CIWA  of 1 this AM.   - seen by behavioral health, made recommendations regarding medications  - no reported hx of seizures by pts  and pts mother  - would continue outpatient naltrexone 50mg daily dose  - librium taper  - thiamine/folate/ multivitamin  - keep K>4, Mg>2  - tox  - CATCH/addiction medicine consult ordered and following  - unclear why pt on prazosin (pt and pts mother doesn't know why)  - pt receives medications from [facility] ([phone])  - Knife recovered from pt , addiction medicine aware  - pt remains 1:1  - Psych c/s          Recommend Atarax PO 50 mg Q6H PRN for breakthrough anxiety/insomnia          Patient can receive Zyprexa PO 5 mg Q8H for agitation. Only if patient is at severe risk of harm to self or others and not amendable to verbal redirection or PO medication, can                     escalate to Haldol 5 mg IM; if IM formulation used please order repeat EKG to ensure qtc <500ms          As patient is on Benzodiazapine taper, recommend avoiding Zyprexa IM for episodes of acute agitation          Recommend continuing Librium taper          Recommend Atarax PO 50 mg Q6H PRN for breakthrough anxiety/insomnia      #Pruritis, likely contact dermatitis  - says he may have come into contact with poison ivy while he went messing for the past 2 days  - well demarcated erythema on neck extending to clavicles, dry  - no fever, wbc  - calamine lotion    #Progress Note Handoff  Pending (specify):  psych follow up, librium taper, dispo planning   Family discussion: updated  Disposition:  Unknown at this time________

## 2024-01-01 NOTE — PROGRESS NOTE ADULT - SUBJECTIVE AND OBJECTIVE BOX
KIMMIE ARROYO  33y  Male      Patient is a 33y old  Male who presents with a chief complaint of benzo withdrawal (31 Dec 2023 12:44)      INTERVAL HPI/OVERNIGHT EVENTS:      REVIEW OF SYSTEMS:  CONSTITUTIONAL: No fever, weight loss, or fatigue  RESPIRATORY: No cough, wheezing, chills or hemoptysis; No shortness of breath  CARDIOVASCULAR: No chest pain, palpitations, dizziness, or leg swelling  GASTROINTESTINAL: No abdominal or epigastric pain. No nausea, vomiting, or hematemesis; No diarrhea or constipation. No melena or hematochezia.  GENITOURINARY: No dysuria, frequency, hematuria, or incontinence  NEUROLOGICAL: No headaches, memory loss, loss of strength, numbness, or tremors  SKIN: No itching, burning, rashes, or lesions   MUSCULOSKELETAL: No joint pain or swelling; No muscle, back, or extremity pain  PSYCHIATRIC: No depression, anxiety, mood swings, or difficulty sleeping  All other review of systems negative    T(C): 36.4 (01-01-24 @ 04:39), Max: 36.4 (01-01-24 @ 04:39)  HR: 64 (01-01-24 @ 04:39) (57 - 64)  BP: 128/68 (01-01-24 @ 04:39) (128/68 - 142/65)  RR: 18 (01-01-24 @ 04:39) (18 - 18)  SpO2: --  Wt(kg): --Vital Signs Last 24 Hrs  T(C): 36.4 (01 Jan 2024 04:39), Max: 36.4 (01 Jan 2024 04:39)  T(F): 97.5 (01 Jan 2024 04:39), Max: 97.5 (01 Jan 2024 04:39)  HR: 64 (01 Jan 2024 04:39) (57 - 64)  BP: 128/68 (01 Jan 2024 04:39) (128/68 - 142/65)  BP(mean): 89 (01 Jan 2024 04:39) (89 - 89)  RR: 18 (01 Jan 2024 04:39) (18 - 18)  SpO2: --      PHYSICAL EXAM:  GENERAL: NAD, well-groomed, well-developed  PSYCH: no agitation, baseline mentation  NERVOUS SYSTEM:  Alert & Oriented X3, Motor Strength 5/5 B/L upper and lower extremities; Sensory intact; FTN WNL  PULMONARY: Clear to percussion bilaterally; No rales, rhonchi, wheezing, or rubs  CARDIOVASCULAR: Regular rate and rhythm; No murmurs, rubs, or gallops  GI: Soft, Nontender, Nondistended; Bowel sounds present  EXTREMITIES:  2+ Peripheral Pulses, No clubbing, cyanosis, or edema  LYMPH: No lymphadenopathy noted  SKIN: No rashes or lesions    Consultant(s) Notes Reviewed:  [x ] YES  [ ] NO    Discussed with Consultants/Other Providers [ x] YES     LABS                          15.0   6.56  )-----------( 337      ( 31 Dec 2023 08:22 )             43.7     12-31    141  |  105  |  13  ----------------------------<  115<H>  3.9   |  24  |  0.8    Ca    9.0      31 Dec 2023 08:22  Phos  2.9     12-31  Mg     2.3     12-31    TPro  6.9  /  Alb  4.5  /  TBili  0.5  /  DBili  x   /  AST  27  /  ALT  29  /  AlkPhos  73  12-31      Urinalysis Basic - ( 31 Dec 2023 18:15 )    Color: Yellow / Appearance: Clear / SG: >1.030 / pH: x  Gluc: x / Ketone: Trace mg/dL  / Bili: Negative / Urobili: 1.0 mg/dL   Blood: x / Protein: Negative mg/dL / Nitrite: Negative   Leuk Esterase: Negative / RBC: x / WBC x   Sq Epi: x / Non Sq Epi: x / Bacteria: x        RADIOLOGY & ADDITIONAL TESTS:  - no images 1/1  Imaging Personally Reviewed:  [ ] YES  [ ] NO    HEALTH ISSUES - PROBLEM Dx:  Substance use disorder  Withdrawal from benzodiazepine      MEDICATIONS  (STANDING):  chlordiazePOXIDE 50 milliGRAM(s) Oral every 8 hours  chlordiazePOXIDE   Oral   enoxaparin Injectable 40 milliGRAM(s) SubCutaneous every 24 hours  folic acid 1 milliGRAM(s) Oral daily  influenza   Vaccine 0.5 milliLiter(s) IntraMuscular once  multivitamin 1 Tablet(s) Oral daily  naltrexone 50 milliGRAM(s) Oral daily  OLANZapine 5 milliGRAM(s) Oral daily  pantoprazole    Tablet 40 milliGRAM(s) Oral before breakfast  thiamine 100 milliGRAM(s) Oral daily    MEDICATIONS  (PRN):  calamine/zinc oxide Lotion 1 Application(s) Topical two times a day PRN Rash and/or Itching     KIMMIE ARROYO  33y  Male      Patient is a 33y old  Male who presents with a chief complaint of benzo withdrawal (31 Dec 2023 12:44)      INTERVAL HPI/OVERNIGHT EVENTS: no acute events overnight. CIWA 1. no complaints.       REVIEW OF SYSTEMS:  CONSTITUTIONAL: No fever, weight loss, or fatigue  RESPIRATORY: No cough, wheezing, chills or hemoptysis; No shortness of breath  CARDIOVASCULAR: No chest pain, palpitations, dizziness, or leg swelling  GASTROINTESTINAL: No abdominal or epigastric pain. No nausea, vomiting, or hematemesis; No diarrhea or constipation. No melena or hematochezia.  GENITOURINARY: No dysuria, frequency, hematuria, or incontinence  NEUROLOGICAL: No headaches, memory loss, loss of strength, numbness, or tremors  SKIN: No itching, burning, rashes, or lesions   MUSCULOSKELETAL: No joint pain or swelling; No muscle, back, or extremity pain  PSYCHIATRIC: No depression, anxiety, mood swings, or difficulty sleeping  All other review of systems negative    T(C): 36.4 (01-01-24 @ 04:39), Max: 36.4 (01-01-24 @ 04:39)  HR: 64 (01-01-24 @ 04:39) (57 - 64)  BP: 128/68 (01-01-24 @ 04:39) (128/68 - 142/65)  RR: 18 (01-01-24 @ 04:39) (18 - 18)  SpO2: --  Wt(kg): --Vital Signs Last 24 Hrs  T(C): 36.4 (01 Jan 2024 04:39), Max: 36.4 (01 Jan 2024 04:39)  T(F): 97.5 (01 Jan 2024 04:39), Max: 97.5 (01 Jan 2024 04:39)  HR: 64 (01 Jan 2024 04:39) (57 - 64)  BP: 128/68 (01 Jan 2024 04:39) (128/68 - 142/65)  BP(mean): 89 (01 Jan 2024 04:39) (89 - 89)  RR: 18 (01 Jan 2024 04:39) (18 - 18)  SpO2: --      PHYSICAL EXAM:  GENERAL: young M, NAD  PSYCH: no agitation, baseline mentation  NERVOUS SYSTEM:  Alert & Oriented X3, Motor Strength 5/5 B/L upper and lower extremities; Sensory intact; FTN WNL  PULMONARY: Clear to percussion bilaterally; No rales, rhonchi, wheezing, or rubs  CARDIOVASCULAR: Regular rate and rhythm; No murmurs, rubs, or gallops  GI: Soft, Nontender, Nondistended; Bowel sounds present  EXTREMITIES:  No clubbing, cyanosis, or edema  SKIN: No rashes or lesions    Consultant(s) Notes Reviewed:  [x ] YES  [ ] NO    Discussed with Consultants/Other Providers [ x] YES     LABS                          15.0   6.56  )-----------( 337      ( 31 Dec 2023 08:22 )             43.7     12-31    141  |  105  |  13  ----------------------------<  115<H>  3.9   |  24  |  0.8    Ca    9.0      31 Dec 2023 08:22  Phos  2.9     12-31  Mg     2.3     12-31    TPro  6.9  /  Alb  4.5  /  TBili  0.5  /  DBili  x   /  AST  27  /  ALT  29  /  AlkPhos  73  12-31      Urinalysis Basic - ( 31 Dec 2023 18:15 )    Color: Yellow / Appearance: Clear / SG: >1.030 / pH: x  Gluc: x / Ketone: Trace mg/dL  / Bili: Negative / Urobili: 1.0 mg/dL   Blood: x / Protein: Negative mg/dL / Nitrite: Negative   Leuk Esterase: Negative / RBC: x / WBC x   Sq Epi: x / Non Sq Epi: x / Bacteria: x        RADIOLOGY & ADDITIONAL TESTS:  - no images 1/1  Imaging Personally Reviewed:  [ ] YES  [ ] NO    HEALTH ISSUES - PROBLEM Dx:  Substance use disorder  Withdrawal from benzodiazepine      MEDICATIONS  (STANDING):  chlordiazePOXIDE 50 milliGRAM(s) Oral every 8 hours  chlordiazePOXIDE   Oral   enoxaparin Injectable 40 milliGRAM(s) SubCutaneous every 24 hours  folic acid 1 milliGRAM(s) Oral daily  influenza   Vaccine 0.5 milliLiter(s) IntraMuscular once  multivitamin 1 Tablet(s) Oral daily  naltrexone 50 milliGRAM(s) Oral daily  OLANZapine 5 milliGRAM(s) Oral daily  pantoprazole    Tablet 40 milliGRAM(s) Oral before breakfast  thiamine 100 milliGRAM(s) Oral daily    MEDICATIONS  (PRN):  calamine/zinc oxide Lotion 1 Application(s) Topical two times a day PRN Rash and/or Itching

## 2024-01-02 ENCOUNTER — TRANSCRIPTION ENCOUNTER (OUTPATIENT)
Age: 34
End: 2024-01-02

## 2024-01-02 PROCEDURE — 99232 SBSQ HOSP IP/OBS MODERATE 35: CPT

## 2024-01-02 RX ORDER — PRAZOSIN HCL 2 MG
1 CAPSULE ORAL
Refills: 0 | DISCHARGE

## 2024-01-02 RX ORDER — ACETAMINOPHEN 500 MG
650 TABLET ORAL ONCE
Refills: 0 | Status: DISCONTINUED | OUTPATIENT
Start: 2024-01-02 | End: 2024-01-03

## 2024-01-02 RX ORDER — CLONAZEPAM 1 MG
1 TABLET ORAL
Refills: 0 | DISCHARGE

## 2024-01-02 RX ADMIN — NALTREXONE HYDROCHLORIDE 50 MILLIGRAM(S): 50 TABLET, FILM COATED ORAL at 13:41

## 2024-01-02 RX ADMIN — Medication 100 MILLIGRAM(S): at 12:39

## 2024-01-02 RX ADMIN — OLANZAPINE 5 MILLIGRAM(S): 15 TABLET, FILM COATED ORAL at 12:39

## 2024-01-02 RX ADMIN — Medication 1 TABLET(S): at 12:39

## 2024-01-02 RX ADMIN — Medication 50 MILLIGRAM(S): at 18:12

## 2024-01-02 RX ADMIN — Medication 50 MILLIGRAM(S): at 06:29

## 2024-01-02 RX ADMIN — Medication 1 MILLIGRAM(S): at 12:39

## 2024-01-02 RX ADMIN — ENOXAPARIN SODIUM 40 MILLIGRAM(S): 100 INJECTION SUBCUTANEOUS at 12:39

## 2024-01-02 RX ADMIN — PANTOPRAZOLE SODIUM 40 MILLIGRAM(S): 20 TABLET, DELAYED RELEASE ORAL at 06:28

## 2024-01-02 NOTE — PROGRESS NOTE ADULT - SUBJECTIVE AND OBJECTIVE BOX
24H events:    Patient is a 33y old Male who presents with a chief complaint of benzo withdrawal (02 Jan 2024 08:50)    Primary diagnosis of Benzodiazepine withdrawal without complication    Today is hospital day 3d. This morning patient was seen and examined at bedside, resting comfortably in bed.  He is requesting to be discharged. No acute complaints at this time.  No acute or major events overnight.    Code Status: Full code    PAST MEDICAL & SURGICAL HISTORY  Anxiety    ADHD    Anxiety    Alcohol abuse    Depression    Asthma    History of corneal transplant      SOCIAL HISTORY:  Social History:  Hx of alcohol abuse. Currently smokes marijuana (30 Dec 2023 16:57)      ALLERGIES:  penicillins (Unknown)    MEDICATIONS:  STANDING MEDICATIONS  acetaminophen     Tablet .. 650 milliGRAM(s) Oral once  chlordiazePOXIDE 50 milliGRAM(s) Oral every 12 hours  chlordiazePOXIDE   Oral   enoxaparin Injectable 40 milliGRAM(s) SubCutaneous every 24 hours  folic acid 1 milliGRAM(s) Oral daily  influenza   Vaccine 0.5 milliLiter(s) IntraMuscular once  multivitamin 1 Tablet(s) Oral daily  naltrexone 50 milliGRAM(s) Oral daily  OLANZapine 5 milliGRAM(s) Oral daily  pantoprazole    Tablet 40 milliGRAM(s) Oral before breakfast  thiamine 100 milliGRAM(s) Oral daily    PRN MEDICATIONS  calamine/zinc oxide Lotion 1 Application(s) Topical two times a day PRN  hydrOXYzine hydrochloride 50 milliGRAM(s) Oral every 6 hours PRN    VITALS:   T(F): 96.5  HR: 74  BP: 131/72  RR: 18  SpO2: --    PHYSICAL EXAM:   GENERAL: NAD, lying in bed comfortably  HEAD:  Atraumatic, Normocephalic  NECK: Supple, trachea midline  CHEST/LUNG: Clear to auscultation anteriorly bilaterally  HEART: Regular rate and rhythm  ABDOMEN: Soft, nontender, nondistended  EXTREMITIES: -No clubbing, cyanosis, or edema  NERVOUS SYSTEM:  A&Ox3, no noted facial droop. Moving all extremities w/o difficulty.   SKIN: No rashes or lesions to b/l forearm, face.     ( - ) Indwelling Morales Catheter:   Date insterted:    Reason (  ) Critical illness     (  ) urinary retention    (  ) Accurate Ins/Outs Monitoring     (  ) CMO patient    ( - ) Central Line:   Date inserted:  Location: (  ) Right IJ     (  ) Left IJ     (  ) Right Fem     (  ) Left Fem    ( - ) SPC        ( - ) pigtail       ( - ) PEG tube       ( - ) colostomy       ( - ) jejunostomy  ( - ) U-Dall    LABS:            Urinalysis Basic - ( 31 Dec 2023 18:15 )    Color: Yellow / Appearance: Clear / SG: >1.030 / pH: x  Gluc: x / Ketone: Trace mg/dL  / Bili: Negative / Urobili: 1.0 mg/dL   Blood: x / Protein: Negative mg/dL / Nitrite: Negative   Leuk Esterase: Negative / RBC: x / WBC x   Sq Epi: x / Non Sq Epi: x / Bacteria: x                RADIOLOGY:

## 2024-01-02 NOTE — DISCHARGE NOTE PROVIDER - NSDCMRMEDTOKEN_GEN_ALL_CORE_FT
KlonoPIN 0.5 mg oral tablet: 1 tab(s) orally once a day  naltrexone 50 mg oral tablet: 1 tab(s) orally once a day  prazosin 1 mg oral capsule: 1 cap(s) orally once a day (at bedtime)  ZyPREXA 5 mg oral tablet: 1 tab(s) orally once a day   naltrexone 50 mg oral tablet: 1 tab(s) orally once a day  ZyPREXA 5 mg oral tablet: 1 tab(s) orally once a day   KlonoPIN 0.5 mg oral tablet: orally once a day  Minipress 1 mg oral capsule: orally once a day (at bedtime)  naltrexone 50 mg oral tablet: 1 tab(s) orally once a day  ZyPREXA 5 mg oral tablet: 1 tab(s) orally once a day (at bedtime) Please take 1 and a 1/2 tablet daily at bedtime   KlonoPIN 0.5 mg oral tablet: orally 2 times a day  Minipress 1 mg oral capsule: orally once a day (at bedtime)  naltrexone 50 mg oral tablet: 1 tab(s) orally once a day  ZyPREXA 5 mg oral tablet: 1 tab(s) orally once a day (at bedtime) Please take 1 and a 1/2 tablet daily at bedtime

## 2024-01-02 NOTE — DISCHARGE NOTE PROVIDER - HOSPITAL COURSE
33-year-old male with PMH ADHD, ORIN, alcohol use disorder presenting after taking 3 tablets of OxyContin. Pt normally takes klonopin 0.5mg daily for his ORIN but says that the klonopin doesn't help manage his anxiety and so he took 3 oxycontin pills.  Says he found the pills on the corner of the street, however his mother  at bedside says that he bought the oxycontin. His mother says that he's prescribed a 14 day supply of klonopin 0.5mg daily at a time from [facility] and is allowed to take it BID if needed and thus ran out of it.   Patient denies suicidal ideation or homicidal ideation. He also reports a hx of alcohol abuse and has been clean for sometime but says he had drank between 12/23-12/25 but doesn't specify what he drank or the quantity. His mother says that he has been missing from her apartment and has been wandering the streets and doesn't know what else he has been up to while away from home. Smokes marijuana as well. No chest pain, SOB, N/V, lightheadedness, dizziness.     In the ED, /86. Labs unremarkable.     During the course of the hospitalization, he was seen by psych and addiction medicine.   #Benzodiazapine Use Disorder, current withdrawal  #Cannabis Use Disorder (of note, patient has history of cannabis induced psychosis, last cannabis use reported to be 12/29)  # Hx of alcohol use, last drink 12/24  - Recommend continuing Librium taper  - Recommend Atarax PO 50 mg Q6H PRN for breakthrough anxiety/insomnia  - Recommend continuing folic acid/ thiamine/ multivitamin  - Follow up Urine Drug Screen    #ORIN  #ADHD  - Recommend continuing Zyprexa PO 7.5 mg once daily at bedtime, home dose confirmed with pharmacy  - Recommend continuing home medication Naltrexone 50 mg once daily  - Recommend holding home medication Prazosin 1 mg once daily until patient's blood pressure stabilized    #Agitation  - Recommend Atarax PO 50 mg Q6H PRN for breakthrough anxiety/insomnia  - Patient can receive Zyprexa PO 5 mg Q8H for agitation. Only if patient is at severe risk of harm to self or others and not amendable to verbal redirection or PO medication, can escalate to Haldol 5 mg IM; if IM formulation used please order repeat EKG to ensure qtc <500ms  - As patient is on Benzodiazapine taper, recommend avoiding Zyprexa IM for episodes of acute agitation 33-year-old male with PMH ADHD, ORIN, alcohol use disorder presenting after taking 3 tablets of OxyContin. Pt normally takes klonopin 0.5mg daily for his ORIN but says that the klonopin doesn't help manage his anxiety and so he took 3 oxycontin pills.  Says he found the pills on the corner of the street, however his mother  at bedside says that he bought the oxycontin. His mother says that he's prescribed a 14 day supply of klonopin 0.5mg daily at a time from [facility] and is allowed to take it BID if needed and thus ran out of it.   Patient denies suicidal ideation or homicidal ideation. He also reports a hx of alcohol abuse and has been clean for sometime but says he had drank between 12/23-12/25 but doesn't specify what he drank or the quantity. His mother says that he has been missing from her apartment and has been wandering the streets and doesn't know what else he has been up to while away from home. Smokes marijuana as well. No chest pain, SOB, N/V, lightheadedness, dizziness.     In the ED, /86. Labs unremarkable.     During the course of the hospitalization, he was seen by psych and addiction medicine.   #Benzodiazapine Use Disorder, current withdrawal  #Cannabis Use Disorder (of note, patient has history of cannabis induced psychosis, last cannabis use reported to be 12/29)  # Hx of alcohol use, last drink 12/24  - Recommend continuing Librium taper  - Recommend Atarax PO 50 mg Q6H PRN for breakthrough anxiety/insomnia  - Recommend continuing folic acid/ thiamine/ multivitamin  - Follow up Urine Drug Screen    #ORIN  #ADHD  - Recommend continuing Zyprexa PO 7.5 mg once daily at bedtime, home dose confirmed with pharmacy  - Recommend continuing home medication Naltrexone 50 mg once daily  - Recommend holding home medication Prazosin 1 mg once daily until patient's blood pressure stabilized    #Agitation  - Recommend Atarax PO 50 mg Q6H PRN for breakthrough anxiety/insomnia  - Patient can receive Zyprexa PO 5 mg Q8H for agitation. Only if patient is at severe risk of harm to self or others and not amendable to verbal redirection or PO medication, can escalate to Haldol 5 mg IM; if IM formulation used please order repeat EKG to ensure qtc <500ms  - As patient is on Benzodiazapine taper, recommend avoiding Zyprexa IM for episodes of acute agitation 33-year-old male with PMH ADHD, ORIN, alcohol use disorder presenting after taking 3 tablets of OxyContin. Pt normally takes klonopin 0.5mg daily for his ORIN but says that the klonopin doesn't help manage his anxiety and so he took 3 oxycontin pills.  Says he found the pills on the corner of the street, however his mother  at bedside says that he bought the oxycontin. His mother says that he's prescribed a 14 day supply of klonopin 0.5mg daily at a time from [facility] and is allowed to take it BID if needed and thus ran out of it.   Patient denies suicidal ideation or homicidal ideation. He also reports a hx of alcohol abuse and has been clean for sometime but says he had drank between 12/23-12/25 but doesn't specify what he drank or the quantity. His mother says that he has been missing from her apartment and has been wandering the streets and doesn't know what else he has been up to while away from home. Smokes marijuana as well. No chest pain, SOB, N/V, lightheadedness, dizziness.     In the ED, /86. Labs unremarkable.     During the course of the hospitalization, he was seen by psych and addiction medicine. He was treated with a librium taper, atarax for breakthrough anxiety/insomnia. He had significant improvement in symptoms.     Patient is medically stable and ready for discharge.   33-year-old male with PMH ADHD, ORIN, alcohol use disorder presenting after taking 3 tablets of OxyContin. Pt normally takes klonopin 0.5mg daily for his ORIN but says that the klonopin doesn't help manage his anxiety and so he took 3 oxycontin pills.  Says he found the pills on the corner of the street, however his mother  at bedside says that he bought the oxycontin. His mother says that he's prescribed a 14 day supply of klonopin 0.5mg daily at a time from [facility] and is allowed to take it BID if needed and thus ran out of it.   Patient denies suicidal ideation or homicidal ideation. He also reports a hx of alcohol abuse and has been clean for sometime but says he had drank between 12/23-12/25 but doesn't specify what he drank or the quantity. His mother says that he has been missing from her apartment and has been wandering the streets and doesn't know what else he has been up to while away from home. Smokes marijuana as well. No chest pain, SOB, N/V, lightheadedness, dizziness.     In the ED, /86. Labs unremarkable.     During the course of the hospitalization, he was seen by psych and addiction medicine. He was treated with a librium taper, atarax for breakthrough anxiety/insomnia. He had significant improvement in symptoms.     Patient is medically stable and ready for discharge.   33-year-old male with PMH ADHD, ORIN, alcohol use disorder presenting after taking 3 tablets of OxyContin. Pt normally takes klonopin 0.5mg daily for his ORIN but says that the klonopin doesn't help manage his anxiety and so he took 3 oxycontin pills.  Says he found the pills on the corner of the street, however his mother  at bedside says that he bought the oxycontin. His mother says that he's prescribed a 14 day supply of klonopin 0.5mg daily at a time from [facility] and is allowed to take it BID if needed and thus ran out of it.   Patient denies suicidal ideation or homicidal ideation. He also reports a hx of alcohol abuse and has been clean for sometime but says he had drank between 12/23-12/25 but doesn't specify what he drank or the quantity. His mother says that he has been missing from her apartment and has been wandering the streets and doesn't know what else he has been up to while away from home. Smokes marijuana as well. No chest pain, SOB, N/V, lightheadedness, dizziness.     In the ED, /86. Labs unremarkable.     During the course of the hospitalization, he was seen by psych and addiction medicine. He was treated with a librium taper, atarax for breakthrough anxiety/insomnia. He had significant improvement in symptoms.     Patient was seen and cleared by Psych twice and has an appointment to follow with his [place] counsellor (Nina, [phone])  Patient is medically stable and ready for discharge.   33-year-old male with PMH ADHD, ORIN, alcohol use disorder presenting after taking 3 tablets of OxyContin. Pt normally takes klonopin 0.5mg daily for his ORIN but says that the klonopin doesn't help manage his anxiety and so he took 3 oxycontin pills.  Says he found the pills on the corner of the street, however his mother  at bedside says that he bought the oxycontin. His mother says that he's prescribed a 14 day supply of klonopin 0.5mg daily at a time from [facility] and is allowed to take it BID if needed and thus ran out of it.   Patient denies suicidal ideation or homicidal ideation. He also reports a hx of alcohol abuse and has been clean for sometime but says he had drank between 12/23-12/25 but doesn't specify what he drank or the quantity. His mother says that he has been missing from her apartment and has been wandering the streets and doesn't know what else he has been up to while away from home. Smokes marijuana as well. No chest pain, SOB, N/V, lightheadedness, dizziness.     In the ED, /86. Labs unremarkable.     During the course of the hospitalization, he was seen by psych and addiction medicine. He was treated with a librium taper, atarax for breakthrough anxiety/insomnia. He had significant improvement in symptoms.     Patient was seen and cleared by Psych twice and has an appointment to follow with his [place] counsellor (Nina, [phone])  Patient is medically stable and ready for discharge.

## 2024-01-02 NOTE — DISCHARGE NOTE PROVIDER - PROVIDER TOKENS
PROVIDER:[TOKEN:[[id number]],FOLLOWUP:[2 weeks]] PROVIDER:[TOKEN:[[id number]],FOLLOWUP:[2 weeks]]

## 2024-01-02 NOTE — PROGRESS NOTE ADULT - ASSESSMENT
33-year-old male with PMH ADHD, ORIN, alcohol use disorder presenting after taking 3 tablets of OxyContin this morning. Currently HDS, afebrile and satting well on RA.     #Benzodiazepine withdrawal  #Hx of alcohol abuse  #ORIN, ADHD  - continue outpatient naltrexone 50mg daily dose  - librium taper  - thiamine/folate/ multivitamin  - keep K>4, Mg>2  - tox  - CATCH/addiction medicine consult ordered and following  - unclear why pt on prazosin (pt and pts mother doesn't know why)  - pt receives medications from [facility] ([phone])  - Knife recovered from pt , addiction medicine aware  - pt remains 1:1  - Psych c/s          Recommend Atarax PO 50 mg Q6H PRN for breakthrough anxiety/insomnia          Patient can receive Zyprexa PO 5 mg Q8H for agitation. Only if patient is at severe risk of harm to self or others and not amendable to verbal redirection or PO medication, can                     escalate to Haldol 5 mg IM; if IM formulation used please order repeat EKG to ensure qtc <500ms          As patient is on Benzodiazapine taper, recommend avoiding Zyprexa IM for episodes of acute agitation          Recommend continuing Librium taper          Recommend Atarax PO 50 mg Q6H PRN for breakthrough anxiety/insomnia      #Pruritis, likely contact dermatitis  - says he may have come into contact with poison ivy while he went messing for the past 2 days  - well demarcated erythema on neck extending to clavicles, dry  - no fever, wbc  - calamine lotion     33-year-old male with PMH ADHD, ORIN, alcohol use disorder presenting after taking 3 tablets of OxyContin this morning. Currently HDS, afebrile and satting well on RA.     #Benzodiazepine withdrawal  #Hx of alcohol abuse  #ORIN, ADHD  - continue outpatient naltrexone 50mg daily dose  - librium taper  - thiamine/folate/ multivitamin  - keep K>4, Mg>2  - tox  - CATCH/addiction medicine consult ordered and following  - unclear why pt on prazosin (pt and pts mother doesn't know why)  - pt receives medications from [facility] ([phone])  - Knife recovered from pt , addiction medicine aware  - pt remains 1:1  - Psych c/s          Recommend Atarax PO 50 mg Q6H PRN for breakthrough anxiety/insomnia          Patient can receive Zyprexa PO 5 mg Q8H for agitation. Only if patient is at severe risk of harm to self or others and not amendable to verbal redirection or PO medication, can                     escalate to Haldol 5 mg IM; if IM formulation used please order repeat EKG to ensure qtc <500ms          As patient is on Benzodiazapine taper, recommend avoiding Zyprexa IM for episodes of acute agitation          Recommend continuing Librium taper          Recommend Atarax PO 50 mg Q6H PRN for breakthrough anxiety/insomnia      #Pruritis, likely contact dermatitis  - says he may have come into contact with poison ivy while he went messing for the past 2 days  - well demarcated erythema on neck extending to clavicles, dry  - no fever, wbc  - calamine lotion

## 2024-01-02 NOTE — DISCHARGE NOTE PROVIDER - ATTENDING DISCHARGE PHYSICAL EXAMINATION:
GENERAL: young M, NAD  PSYCH: no agitation, baseline mentation  NERVOUS SYSTEM:  Alert & Oriented X3, Motor Strength 5/5 B/L upper and lower extremities; Sensory intact; FTN WNL  PULMONARY: Clear to percussion bilaterally; No rales, rhonchi, wheezing, or rubs  CARDIOVASCULAR: Regular rate and rhythm; No murmurs, rubs, or gallops  GI: Soft, Nontender, Nondistended; Bowel sounds present  EXTREMITIES:  No clubbing, cyanosis, or edema  SKIN: No rashes or lesions

## 2024-01-02 NOTE — DISCHARGE NOTE PROVIDER - CARE PROVIDER_API CALL
Francesca Dietrich  Internal Medicine  [address] [place] [id number]  Phone: [phone]  Fax: [phone]  Follow Up Time: 2 weeks   Francesca Dietrich  Internal Medicine  [address] [place] [id number]  Phone: [phone]  Fax: [phone]  Follow Up Time: 2 weeks

## 2024-01-02 NOTE — DISCHARGE NOTE PROVIDER - CARE PROVIDERS DIRECT ADDRESSES
,[email or web address]

## 2024-01-02 NOTE — DISCHARGE NOTE PROVIDER - NSDCCPCAREPLAN_GEN_ALL_CORE_FT
PRINCIPAL DISCHARGE DIAGNOSIS  Diagnosis: Benzodiazepine withdrawal without complication  Assessment and Plan of Treatment: You were treated in the hospital for benzodiazepine withdrawal. This was done with the help of a librium taper. We recommend that you follow up with [facility] upon discharge.   Please take your medications as prescribed.   Please return to the ER if you experience fever, tremors, chills, or seizures.   Please follow up with Dr Dietrich or your PCP upon discharge.     PRINCIPAL DISCHARGE DIAGNOSIS  Diagnosis: Benzodiazepine withdrawal without complication  Assessment and Plan of Treatment: You were treated in the hospital for benzodiazepine withdrawal. This was done with the help of a librium taper. We recommend that you follow up with [facility] upon discharge.   Please take your medications as prescribed.   Please return to the ER if you experience fever, tremors, chills, or seizures.   Please follow up with Dr Dietrich or your PCP upon discharge.  He has an appointment to follow with his [facility] counsellor (Nina, [phone]).     PRINCIPAL DISCHARGE DIAGNOSIS  Diagnosis: Benzodiazepine withdrawal without complication  Assessment and Plan of Treatment: You were treated in the hospital for benzodiazepine withdrawal. This was done with the help of a librium taper. We recommend that you follow up with [facility] upon discharge.   Please take your medications as prescribed.   Please return to the ER if you experience fever, tremors, chills, or seizures.   Please follow up with Dr Dietrich or your PCP upon discharge.  He has an appointment to follow with his [facility] counsellor (Nina, [phone]).

## 2024-01-03 ENCOUNTER — TRANSCRIPTION ENCOUNTER (OUTPATIENT)
Age: 34
End: 2024-01-03

## 2024-01-03 VITALS
RESPIRATION RATE: 18 BRPM | TEMPERATURE: 96 F | SYSTOLIC BLOOD PRESSURE: 127 MMHG | HEART RATE: 97 BPM | DIASTOLIC BLOOD PRESSURE: 73 MMHG

## 2024-01-03 DIAGNOSIS — F90.9 ATTENTION-DEFICIT HYPERACTIVITY DISORDER, UNSPECIFIED TYPE: ICD-10-CM

## 2024-01-03 PROCEDURE — 99231 SBSQ HOSP IP/OBS SF/LOW 25: CPT | Mod: 95

## 2024-01-03 PROCEDURE — 99238 HOSP IP/OBS DSCHRG MGMT 30/<: CPT

## 2024-01-03 RX ORDER — CLONAZEPAM 1 MG
0 TABLET ORAL
Qty: 0 | Refills: 0 | DISCHARGE
Start: 2024-01-03

## 2024-01-03 RX ORDER — OLANZAPINE 15 MG/1
1 TABLET, FILM COATED ORAL
Qty: 0 | Refills: 0 | DISCHARGE

## 2024-01-03 RX ORDER — PRAZOSIN HCL 2 MG
0 CAPSULE ORAL
Qty: 0 | Refills: 0 | DISCHARGE
Start: 2024-01-03

## 2024-01-03 RX ADMIN — Medication 100 MILLIGRAM(S): at 13:37

## 2024-01-03 RX ADMIN — PANTOPRAZOLE SODIUM 40 MILLIGRAM(S): 20 TABLET, DELAYED RELEASE ORAL at 06:59

## 2024-01-03 RX ADMIN — Medication 1 MILLIGRAM(S): at 13:37

## 2024-01-03 RX ADMIN — Medication 50 MILLIGRAM(S): at 06:59

## 2024-01-03 RX ADMIN — Medication 1 TABLET(S): at 13:37

## 2024-01-03 RX ADMIN — NALTREXONE HYDROCHLORIDE 50 MILLIGRAM(S): 50 TABLET, FILM COATED ORAL at 13:40

## 2024-01-03 RX ADMIN — OLANZAPINE 5 MILLIGRAM(S): 15 TABLET, FILM COATED ORAL at 13:39

## 2024-01-03 NOTE — BH CONSULTATION LIAISON PROGRESS NOTE - NSBHFUPINTERVALHXFT_PSY_A_CORE
Patient seen and interviewed , 1 to 1 at bedside     According to the nurse taking care of patient , he has since been in [facility] but kicked his parents out as he did not want them involved in his care . She reports that patient was said to have been agitated earlier because he wanted to leave but has since calmed down. She reports that he is eating well , and sleeping well . She reports that she has not observed any psychotic symptoms and she has not noticed any symptoms or behaviors that would give her concern for worsening depression .     Writer obtained collateral information patient 's counsellor Ms Wood at the [facility]. She reports that she has been taking care of patient for the past 7 months after he left substance use rehab. She reports that largely he has been sober from all illicit drugs but continues to use marijuana . She reports that she has an episode of cannabis induced auditory hallucinations a whole ago that resolved one the effect of cannabis wore off. She dd confirm that patient and his parents don't have a good relationship and that patient has been homeless in the past . She reports that it seems patient had a traumatic experience during this time, that he seems to be preoccupied with and it causes significant anxiety for him. She reports that patient is usually complaint with his psychiatric medications and his outpatient appointment however she hasn't seen him in the last 3 weeks because he seems to be very anxious . She reports that it seem that has been taking more Klonopin that he normally would which is very unlike him but it seems that it is because of the recent worseing anxiety. She confirmed patient 's medications as Klonopin 0.5 mg once daily, Naltrexone 50 mg once daily, Prazosin 1 mg once bedtime, and Zyprexa 7.5 mg once bedtime .She confirms his psychiatric diagnosis as ORIN and ADHD however she reports that she suspects that he has features of autism spectrum disorder .   She reports that patient has lived in the [facility] in the past and seems to be comfortable there . She reports that he can follow up with the clinic despite being in the shelter . She reports that he can come to see her on Friday 1/5/24 at 3.45pm.      Writer spoke to patient's mum ms Prince ( [phone]). She reports that she is concerned that her son is hearing voices and is not doing well mentally. When asked when last she noticed that he was hearing voices , she states " last week, when he was in my house , he is not behaving okay". She reports that a doctopr in the hospital told him that he might be schizophrenia . She rep[orts that she believes that his presentation to the hospital was because he ran out of his Klonopin since he has been taking it twice a day instead of one a day and did not have enough to cover him.   patient's mother was informed that at the time of the evaluation, patient did not have acute symptoms of psychosis . She was informed that writer spoke with his outpatient counselor and discussed his current care and aftercare . Patient 's mother was asked about her concern about his disposition. She was informed that patient did not want to go back home and wants to go to the shelter . She reports that she is not happy about the idea but reports that patient has been there before . She was told that an appointment has been made for patient to follow up at [facility] and that she will be discharged on enough medications to cover him to his next appointment !!!! She verbalised understanding and was agreeable  Patient seen and interviewed , 1 to 1 at bedside     According to the nurse taking care of patient , he has since been in good behavioral control but kicked his parents out as he did not want them involved in his care . She reports that patient was said to have been agitated earlier because he wanted to leave but has since calmed down. She reports that he is eating well , and sleeping well . She reports that she has not observed any psychotic symptoms and she has not noticed any symptoms or behaviors that would give her concern for worsening depression .     Writer obtained collateral information patient 's counsellor Ms Wood at the [facility]. She reports that she has been taking care of patient for the past 7 months after he left substance use rehab. She reports that largely he has been sober from all illicit drugs but continues to use marijuana . She reports that she has an episode of cannabis induced auditory hallucinations a whole ago that resolved one the effect of cannabis wore off. She dd confirm that patient and his parents don't have a good relationship and that patient has been homeless in the past . She reports that it seems patient had a traumatic experience during this time, that he seems to be preoccupied with and it causes significant anxiety for him. She reports that patient is usually complaint with his psychiatric medications and his outpatient appointment however she hasn't seen him in the last 3 weeks because he seems to be very anxious . She reports that it seem that has been taking more Klonopin that he normally would which is very unlike him but it seems that it is because of the recent worseing anxiety. She confirmed patient 's medications as Klonopin 0.5 mg once daily, Naltrexone 50 mg once daily, Prazosin 1 mg once bedtime, and Zyprexa 7.5 mg once bedtime .She confirms his psychiatric diagnosis as ORIN and ADHD however she reports that she suspects that he has features of autism spectrum disorder .   She reports that [facility] has lived in the [facility] and seems to be comfortable there . She reports that he can follow up with the clinic despite being in the shelter . She reports that he can come to see her on Friday 1/5/24 at 3.45pm.      Writer spoke to patient's mum ms Prince ( [phone]). She reports that she is concerned that her son is hearing voices and is not doing well mentally. When asked when last she noticed that he was hearing voices , she states " last week, when he was in my house , he is not behaving okay". She reports that a doctopr in the hospital told him that he might be schizophrenia . She rep[orts that she believes that his presentation to the hospital was because he ran out of his Klonopin since he has been taking it twice a day instead of one a day and did not have enough to cover him.   patient's mother was informed that at the time of the evaluation, patient did not have acute symptoms of psychosis . She was informed that writer spoke with his outpatient counselor and discussed his current care and aftercare . Patient 's mother was asked about her concern about his disposition. She was informed that patient did not want to go back home and wants to go to the shelter . She reports that she is not happy about the idea but reports that patient has been there before . She was told that an appointment has been made for patient to follow up at [facility] and that she will be discharged on enough medications to cover him to his next appointment !!!! She verbalised understanding and was agreeable  Patient seen and interviewed , 1 to 1 at bedside   Upon approach, patient was observed to be sitting at the edge of his hospital bed , calm cooperative , not agitated   he reports that he feels better and does not know why he is not discharged from the hospital. When asked why his mother would be concerned about him , he reports that he doesnt know .   Patient reports that he doesn't want to go home with his parents as he does not get along with them. When asked if he would like to discuss the problems he is having with his parents, he declined . he reports that he wants to go to the [facility] and will be able to go for his appointments at [facility] from there . When asked how he would get this done, patient states " [place] ride away".   he denies acute symptoms of depression, psychosis or reyna . he denies having current suicidal thoughts , intent or plan.     According to the nurse taking care of patient , he has since been in good behavioral control but kicked his parents out as he did not want them involved in his care . She reports that patient was said to have been agitated earlier because he wanted to leave but has since calmed down. She reports that he is eating well , and sleeping well . She reports that she has not observed any psychotic symptoms and she has not noticed any symptoms or behaviors that would give her concern for worsening depression .     Writer obtained collateral information patient 's counsellor Ms Wood at the [facility]. She reports that she has been taking care of patient for the past 7 months after he left substance use rehab. She reports that largely he has been sober from all illicit drugs but continues to use marijuana . She reports that she has an episode of cannabis induced auditory hallucinations a whole ago that resolved one the effect of cannabis wore off. She dd confirm that patient and his parents don't have a good relationship and that patient has been homeless in the past . She reports that it seems patient had a traumatic experience during this time, that he seems to be preoccupied with and it causes significant anxiety for him. She reports that patient is usually complaint with his psychiatric medications and his outpatient appointment however she hasn't seen him in the last 3 weeks because he seems to be very anxious . She reports that it seem that has been taking more Klonopin that he normally would which is very unlike him but it seems that it is because of the recent worseing anxiety. She confirmed patient 's medications as Klonopin 0.5 mg once daily, Naltrexone 50 mg once daily, Prazosin 1 mg once bedtime, and Zyprexa 7.5 mg once bedtime .She confirms his psychiatric diagnosis as ORIN and ADHD however she reports that she suspects that he has features of autism spectrum disorder .   She reports that patient has lived in the [facility] in the past and seems to be comfortable there . She reports that he can follow up with the clinic despite being in the shelter . She reports that he can come to see her on Friday 1/5/24 at 3.45pm.      Writer spoke to patient's mum ms Prince ( [phone]). She reports that she is concerned that her son is hearing voices and is not doing well mentally. When asked when last she noticed that he was hearing voices , she states " last week, when he was in my house , he is not behaving okay". She reports that a doctopr in the hospital told him that he might be schizophrenia . Patient's mother reports that patient was always in special education as a child into high school. She reports that he had an IEP in school but she does not remember the diagnosis on the IEP. She reports that he did not have a lot of friends and also he had multiple medical problems and was always in and out of the hospital as a child. She reports that current ly patient has 2 children and he has no access to them causing him some distress. She rep[orts that she believes that his presentation to the hospital was because he ran out of his Klonopin since he has been taking it twice a day instead of one a day and did not have enough to cover him.   patient's mother was informed that at the time of the evaluation, patient did not have acute symptoms of psychosis . She was informed that writer spoke with his outpatient counselor and discussed his current care and aftercare . Patient 's mother was asked about her concern about his disposition. She was informed that patient did not want to go back home and wants to go to the shelter . She reports that she is not happy about the idea but reports that patient has been there before . She was told that an appointment has been made for patient to follow up at [facility] and that she will be discharged on enough medications to cover him to his next appointment !!!! She verbalise understanding and was agreeable  Patient seen and interviewed , 1 to 1 at bedside   Upon approach, patient was observed to be sitting at the edge of his hospital bed , calm cooperative , not agitated   he reports that he feels better and does not know why he is not discharged from the hospital. When asked why his mother would be concerned about him , he reports that he doesnt know .   Patient reports that he doesn't want to go home with his parents as he does not get along with them. When asked if he would like to discuss the problems he is having with his parents, he declined . he reports that he wants to go to the [facility] and will be able to go for his appointments at [facility] from there . When asked how he would get this done, patient states " its just a ferry ride away".   he denies acute symptoms of depression, psychosis or reyna . he denies having current suicidal thoughts , intent or plan.     According to the nurse taking care of patient , he has since been in good behavioral control but kicked his parents out as he did not want them involved in his care . She reports that patient was said to have been agitated earlier because he wanted to leave but has since calmed down. She reports that he is eating well , and sleeping well . She reports that she has not observed any psychotic symptoms and she has not noticed any symptoms or behaviors that would give her concern for worsening depression .     Writer obtained collateral information patient 's counsellor Ms Wood at the [facility]. She reports that she has been taking care of patient for the past 7 months after he left substance use rehab. She reports that largely he has been sober from all illicit drugs but continues to use marijuana . She reports that she has an episode of cannabis induced auditory hallucinations a whole ago that resolved one the effect of cannabis wore off. She dd confirm that patient and his parents don't have a good relationship and that patient has been homeless in the past . She reports that it seems patient had a traumatic experience during this time, that he seems to be preoccupied with and it causes significant anxiety for him. She reports that patient is usually complaint with his psychiatric medications and his outpatient appointment however she hasn't seen him in the last 3 weeks because he seems to be very anxious . She reports that it seem that has been taking more Klonopin that he normally would which is very unlike him but it seems that it is because of the recent worseing anxiety. She confirmed patient 's medications as Klonopin 0.5 mg once daily, Naltrexone 50 mg once daily, Prazosin 1 mg once bedtime, and Zyprexa 7.5 mg once bedtime .She confirms his psychiatric diagnosis as ORIN and ADHD however she reports that she suspects that he has features of autism spectrum disorder .   She reports that patient has lived in the [facility] in the past and seems to be comfortable there . She reports that he can follow up with the clinic despite being in the shelter . She reports that he can come to see her on Friday 1/5/24 at 3.45pm.      Writer spoke to patient's mum ms Prince ( [phone]). She reports that she is concerned that her son is hearing voices and is not doing well mentally. When asked when last she noticed that he was hearing voices , she states " last week, when he was in my house , he is not behaving okay". She reports that a doctopr in the hospital told him that he might be schizophrenia . Patient's mother reports that patient was always in special education as a child into high school. She reports that he had an IEP in school but she does not remember the diagnosis on the IEP. She reports that he did not have a lot of friends and also he had multiple medical problems and was always in and out of the hospital as a child. She reports that current ly patient has 2 children and he has no access to them causing him some distress. She rep[orts that she believes that his presentation to the hospital was because he ran out of his Klonopin since he has been taking it twice a day instead of one a day and did not have enough to cover him.   patient's mother was informed that at the time of the evaluation, patient did not have acute symptoms of psychosis . She was informed that writer spoke with his outpatient counselor and discussed his current care and aftercare . Patient 's mother was asked about her concern about his disposition. She was informed that patient did not want to go back home and wants to go to the shelter . She reports that she is not happy about the idea but reports that patient has been there before . She was told that an appointment has been made for patient to follow up at [facility] and that she will be discharged on enough medications to cover him to his next appointment !!!! She verbalise understanding and was agreeable

## 2024-01-03 NOTE — BH CONSULTATION LIAISON PROGRESS NOTE - CURRENT MEDICATION
MEDICATIONS  (STANDING):  acetaminophen     Tablet .. 650 milliGRAM(s) Oral once  enoxaparin Injectable 40 milliGRAM(s) SubCutaneous every 24 hours  folic acid 1 milliGRAM(s) Oral daily  influenza   Vaccine 0.5 milliLiter(s) IntraMuscular once  multivitamin 1 Tablet(s) Oral daily  naltrexone 50 milliGRAM(s) Oral daily  OLANZapine 5 milliGRAM(s) Oral daily  pantoprazole    Tablet 40 milliGRAM(s) Oral before breakfast  thiamine 100 milliGRAM(s) Oral daily    MEDICATIONS  (PRN):  calamine/zinc oxide Lotion 1 Application(s) Topical two times a day PRN Rash and/or Itching  hydrOXYzine hydrochloride 50 milliGRAM(s) Oral every 6 hours PRN Agitation  
MEDICATIONS  (STANDING):  chlordiazePOXIDE   Oral   chlordiazePOXIDE 50 milliGRAM(s) Oral every 6 hours  chlordiazePOXIDE 50 milliGRAM(s) Oral every 8 hours  enoxaparin Injectable 40 milliGRAM(s) SubCutaneous every 24 hours  folic acid 1 milliGRAM(s) Oral daily  influenza   Vaccine 0.5 milliLiter(s) IntraMuscular once  multivitamin 1 Tablet(s) Oral daily  naltrexone 50 milliGRAM(s) Oral daily  OLANZapine 5 milliGRAM(s) Oral daily  thiamine 100 milliGRAM(s) Oral daily    MEDICATIONS  (PRN):  calamine/zinc oxide Lotion 1 Application(s) Topical two times a day PRN Rash and/or Itching

## 2024-01-03 NOTE — DISCHARGE NOTE NURSING/CASE MANAGEMENT/SOCIAL WORK - PATIENT PORTAL LINK FT
You can access the FollowMyHealth Patient Portal offered by [facility] by registering at the following website: [email or web address]. By joining [employer]’s FollowMyHealth portal, you will also be able to view your health information using other applications (apps) compatible with our system. You can access the FollowMyHealth Patient Portal offered by [facility] by registering at the following website: [email or web address]. By joining [employer]’s FollowMyHealth portal, you will also be able to view your health information using other applications (apps) compatible with our system.

## 2024-01-03 NOTE — BH CONSULTATION LIAISON PROGRESS NOTE - NSBHCONSULTFOLLOWAFTERCARE_PSY_A_CORE FT
Patient has an appointment with Counselor Nina at [facility] ( [address] [place] [address], phone number - [phone]) on 1/5/2024 , at 3.45pm . Patient doesn't want to be discharged home, wants to go to the [place] [facility] . Counselor said he has been there before so okay.  Patient has an appointment with Counselor Nina at [facility] ( [address] [place] [address], phone number - [phone]) on 1/5/2024 , at 3.45pm . Patient doesn't want to be discharged home, wants to go to the [place] [facility] . Counselor said he has been there before so okay.  Patient has an appointment with Counselor Nina at [facility] ( [address] [place] [address], phone number - [phone]) on 1/5/2024 , at 3.45pm .   Patient doesn't want to be discharged home, wants to go to the [facility] .  Patient has an appointment with Counselor Nina at [facility] ( [address] [place] [address], phone number - [phone]) on 1/5/2024 , at 3.45pm .   Patient doesn't want to be discharged home, [facility] .  Patient has an appointment with Counselor Nina at [facility] ( [address] [place] [address], phone number - [phone]) on 1/5/2024 , at 3.45pm .   Patient doesn't want to be discharged home, wants to go to the [facility] .  Patient has an appointment with Counselor Nina at [facility] ( [address] [place] [address], phone number - [phone]) on 1/5/2024 , at 3.45pm .   Patient doesn't want to be discharged home, wants to go to the [facility] .  Patient has an appointment with Counselor Nina at [facility] ( [address] [place] [address], phone number - [phone]) on 1/5/2024 , at 3.45pm .   Patient doesn't want to be discharged home, but wants to go to the [facility] .  Patient has an appointment with Counselor Nina at [facility] ( [address] [place] [address], phone number - [phone]) on 1/5/2024 , at 3.45pm .   Patient doesn't want to be discharged home, but wants to go to the [facility] .

## 2024-01-03 NOTE — BH CONSULTATION LIAISON PROGRESS NOTE - NSBHCONSULTRECOMMENDOTHER_PSY_A_CORE FT
1. We recommend that patient's home medications be continued as  prescribed by his outpatient psychiatrist NP [place]   - Naltrexone 50 mg once daily,   - Prazosin 1 mg once bedtime,   - Zyprexa 7.5 mg once bedtime .   2. We recommend increasing to Klonopin 0.5 mg P.O BID for better control of anxiety   3. We recommend social work intervention since patient wants to return to the [facility]   4. Recommend melatonin 5 –10 mg P.O bedtime to regulate sleep wake cycle   5. No further psychiatric intervention is indicated for now, please recall as needed  1. We recommend that patient's home medications be continued as  prescribed by his outpatient psychiatrist NP [place]   - Naltrexone 50 mg once daily,   - Prazosin 1 mg once bedtime,   - Zyprexa 7.5 mg once bedtime .   2. We recommend increasing to Klonopin 0.5 mg P.O BID for better control of anxiety   3. We recommend social work intervention since patient wants to return to the [facility]   4. Recommend melatonin 5 –10 mg P.O bedtime to regulate sleep wake cycle   5. No further psychiatric intervention is indicated for now, please recall as needed

## 2024-01-03 NOTE — BH CONSULTATION LIAISON PROGRESS NOTE - NSBHASSESSMENTFT_PSY_ALL_CORE
12/13/18 0750   Patient Assessment/Suction   Level of Consciousness (AVPU) alert   All Lung Fields Breath Sounds clear   PRE-TX-O2-ETCO2   O2 Device (Oxygen Therapy) room air   SpO2 99 %   Pulse Oximetry Type Intermittent   $ Pulse Oximetry - Multiple Charge Pulse Oximetry - Multiple   Pulse 87   Resp 18   Incentive Spirometer   $ Incentive Spirometer Charges done with encouragement   Administration (IS) instruction provided, follow-up   Number of Repetitions (IS) 10   Level Incentive Spirometer (mL) 3000   Patient Tolerance (IS) good      Mr Law is a 33 year old man with a history of ORIN , ADHD, Substance use disorder  who was admitted to the medical floor benzodiazepine withdrawals . Psychiatry consult was initially called for possible suicidal ideations as patient reportedly took 3 tablets of Oxycontin to end his life . At that time, it was determined that patient had run out of his Klonopin and bough Oxycontin to help manage his anxiety . The psychiatry team was recalled for further medication management recommendations following a benzodiazepine taper . The psychiatry team was then recalled because patient's mother had some concerns about his patient's disposition.   Patients do not appear to have acute symptoms of psychosis, reyna or depression. He denies having current suicidal ideations, intent or plan.  It appear that there are interpersonal conflicts between patient and his parents and his mother is concerned about patient's decision making abilities . patient's mother was supported and informed that  it may be better for patient to return home to his parents, however he has the right to refuse . in addition , he has no acute psychotic symtoms and her psychiatric concerns are reasonable but he does not meet criteria for an involuntarily inpatient psychiatric hospital especially since he has every intention to follow up with his outpatient psychiatric , psychotherapy and substance use providers and they are ready and willing to take him back into their care .     At this time, patient is not considered an imminent danger to himself or others and does not need inpatient psychiatric hospitalization.

## 2024-01-03 NOTE — PROGRESS NOTE ADULT - SUBJECTIVE AND OBJECTIVE BOX
INTERVAL HPI/OVERNIGHT EVENTS:    33-year-old male with PMH ADHD, ORIN, alcohol use disorder presenting after taking 3 tablets of OxyContin this morning. Currently HDS, afebrile and satting well on RA.   feels well  wants to go     REVIEW OF SYSTEMS:  CONSTITUTIONAL: No fever, weight loss, or fatigue  EYES: No eye pain, visual disturbances, or discharge  ENMT:  No difficulty hearing, tinnitus, vertigo; No sinus or throat pain  NECK: No pain or stiffness  BREASTS: No pain, masses, or nipple discharge  RESPIRATORY: No cough, wheezing, chills or hemoptysis; No shortness of breath  CARDIOVASCULAR: No chest pain, palpitations, dizziness, or leg swelling  GASTROINTESTINAL: No abdominal or epigastric pain. No nausea, vomiting, or hematemesis; No diarrhea or constipation. No melena or hematochezia.  GENITOURINARY: No dysuria, frequency, hematuria, or incontinence  NEUROLOGICAL: No headaches, memory loss, loss of strength, numbness, or tremors  SKIN: No itching, burning, rashes, or lesions   LYMPH NODES: No enlarged glands  ENDOCRINE: No heat or cold intolerance; No hair loss  MUSCULOSKELETAL: No joint pain or swelling; No muscle, back, or extremity pain  PSYCHIATRIC: No depression, anxiety, mood swings, or difficulty sleeping  HEME/LYMPH: No easy bruising, or bleeding gums  ALLERY AND IMMUNOLOGIC: No hives or eczema    VITALS:  T(F): 96.9, Max: 96.9 (01-03-24 @ 05:00)  HR: 82  BP: 135/95  RR: 18  SpO2: --    PHYSICAL EXAM:  GENERAL: NAD,   HEAD:  Atraumatic, Normocephalic  EYES: EOMI, PERRLA, conjunctiva and sclera clear  ENMT: No tonsillar erythema, exudates, or enlargement; Moist mucous membranes, Good dentition, No lesions  NECK: Supple, No JVD, Normal thyroid  NERVOUS SYSTEM:  Alert & Oriented X3, Good concentration; Motor Strength 5/5 B/L upper and lower extremities; DTRs 2+ intact and symmetric  CHEST/LUNG: Clear to percussion bilaterally; No rales, rhonchi, wheezing, or rubs  HEART: Regular rate and rhythm; No murmurs, rubs, or gallops  ABDOMEN: Soft, Nontender, Nondistended; Bowel sounds present  EXTREMITIES:  2+ Peripheral Pulses, No clubbing, cyanosis, or edema  LYMPH: No lymphadenopathy noted  SKIN: No rashes or lesions      LABS:                  RADIOLOGY & ADDITIONAL TESTS:    Imaging Personally Reviewed:  [ ] YES  [ ] NO    MEDICATIONS:     MEDICATIONS  (STANDING):  acetaminophen     Tablet .. 650 milliGRAM(s) Oral once  enoxaparin Injectable 40 milliGRAM(s) SubCutaneous every 24 hours  folic acid 1 milliGRAM(s) Oral daily  influenza   Vaccine 0.5 milliLiter(s) IntraMuscular once  multivitamin 1 Tablet(s) Oral daily  naltrexone 50 milliGRAM(s) Oral daily  OLANZapine 5 milliGRAM(s) Oral daily  pantoprazole    Tablet 40 milliGRAM(s) Oral before breakfast  thiamine 100 milliGRAM(s) Oral daily    MEDICATIONS  (PRN):  calamine/zinc oxide Lotion 1 Application(s) Topical two times a day PRN Rash and/or Itching  hydrOXYzine hydrochloride 50 milliGRAM(s) Oral every 6 hours PRN Agitation

## 2024-01-03 NOTE — DISCHARGE NOTE NURSING/CASE MANAGEMENT/SOCIAL WORK - NSDCPEFALRISK_GEN_ALL_CORE
For information on Fall & Injury Prevention, visit: [email or web address] OR  [email or web address] OR  https://www.cdc.gov/steadi/patient.html For information on Fall & Injury Prevention, visit: [email or web address] OR  [email or web address]/[email or web address]-[email or web address]-avoid-injury OR  https://www.cdc.gov/steadi/patient.html

## 2024-01-03 NOTE — PROGRESS NOTE ADULT - ASSESSMENT
33-year-old male with PMH ADHD, ORIN, alcohol use disorder presenting after taking 3 tablets of OxyContin this morning. Currently HDS, afebrile and satting well on RA.       #Benzodiazepine withdrawal  #Hx of alcohol abuse  #ORIN, ADHD  - ran out of klonopin; says he took 3 oxycontin, not in an attempt to hurt himself but for anxiety b/c he says the klonopin doesn't help  - CIWA  of 1 this AM.   - seen by behavioral health, made recommendations regarding medications  - no reported hx of seizures by pts  and pts mother  - would continue outpatient naltrexone 50mg daily dose  - librium taper  - thiamine/folate/ multivitamin  - keep K>4, Mg>2  - tox  - CATCH/addiction medicine consult ordered and following  - pt receives medications from [facility] ([phone])      - Psych c/s          Recommend Atarax PO 50 mg Q6H PRN for breakthrough anxiety/insomnia          Patient can receive Zyprexa PO 5 mg Q8H for agitation. Only if patient is at severe risk of harm to self or others and not amendable to verbal redirection or PO medication, can                     escalate to Haldol 5 mg IM; if IM formulation used please order repeat EKG to ensure qtc <500ms          As patient is on Benzodiazapine taper, recommend avoiding Zyprexa IM for episodes of acute agitation          Recommend continuing Librium taper          Recommend Atarax PO 50 mg Q6H PRN for breakthrough anxiety/insomnia      #Pruritis, likely contact dermatitis  - says he may have come into contact with poison ivy while he went messing for the past 2 days  - well demarcated erythema on neck extending to clavicles, dry  - no fever, wbc  - calamine lotion    Pt's Mother does not feel that he can be discharge as he is not safe outside  Pt wants to go to shelter  Will call psych to reeval and give the discharge plan 33-year-old male with PMH ADHD, ORIN, alcohol use disorder presenting after taking 3 tablets of OxyContin this morning. Currently HDS, afebrile and satting well on RA.       #Benzodiazepine withdrawal  #Hx of alcohol abuse  #ORIN, ADHD  - ran out of klonopin; says he took 3 oxycontin, not in an attempt to hurt himself but for anxiety b/c he says the klonopin doesn't help  - CIWA  of 1 this AM.   - seen by behavioral health, made recommendations regarding medications  - no reported hx of seizures by pts  and pts mother  - would continue outpatient naltrexone 50mg daily dose  - librium taper  - thiamine/folate/ multivitamin  - keep K>4, Mg>2  - tox  - CATCH/addiction medicine consult ordered and following  - pt receives medications from [facility] ([phone])      - Psych c/s          Recommend Atarax PO 50 mg Q6H PRN for breakthrough anxiety/insomnia          Patient can receive Zyprexa PO 5 mg Q8H for agitation. Only if patient is at severe risk of harm to self or others and not amendable to verbal redirection or PO medication, can                     escalate to Haldol 5 mg IM; if IM formulation used please order repeat EKG to ensure qtc <500ms          As patient is on Benzodiazapine taper, recommend avoiding Zyprexa IM for episodes of acute agitation          Recommend continuing Librium taper          Recommend Atarax PO 50 mg Q6H PRN for breakthrough anxiety/insomnia      #Pruritis, likely contact dermatitis  - says he may have come into contact with poison ivy while he went messing for the past 2 days  - well demarcated erythema on neck extending to clavicles, dry  - no fever, wbc  - calamine lotion    Pt's Mother does not feel that he can be discharge as he is not safe outside  Pt wants to go to shelter  Will call psych to reeval and give the discharge plan 33-year-old male with PMH ADHD, ORIN, alcohol use disorder presenting after taking 3 tablets of OxyContin this morning. Currently HDS, afebrile and satting well on RA.       #Benzodiazepine withdrawal  #Hx of alcohol abuse  #ORIN, ADHD  - ran out of klonopin; says he took 3 oxycontin, not in an attempt to hurt himself but for anxiety b/c he says the klonopin doesn't help  - CIWA  of 1 this AM.   - seen by behavioral health, made recommendations regarding medications  - no reported hx of seizures by pts  and pts mother  - would continue outpatient naltrexone 50mg daily dose  - librium taper  - thiamine/folate/ multivitamin  - keep K>4, Mg>2  - tox  - CATCH/addiction medicine consult ordered and following  - pt receives medications from [facility] ([phone])      - Psych c/s          Recommend Atarax PO 50 mg Q6H PRN for breakthrough anxiety/insomnia          Patient can receive Zyprexa PO 5 mg Q8H for agitation. Only if patient is at severe risk of harm to self or others and not amendable to verbal redirection or PO medication, can                     escalate to Haldol 5 mg IM; if IM formulation used please order repeat EKG to ensure qtc <500ms          As patient is on Benzodiazapine taper, recommend avoiding Zyprexa IM for episodes of acute agitation          Recommend continuing Librium taper          Recommend Atarax PO 50 mg Q6H PRN for breakthrough anxiety/insomnia      #Pruritis, likely contact dermatitis  - says he may have come into contact with poison ivy while he went messing for the past 2 days  - well demarcated erythema on neck extending to clavicles, dry  - no fever, wbc  - calamine lotion    Psych has cleared him for discharge   Pt's Mother does not feel that he can be discharge as he is not safe outside  Pt wants to go to shelter  Will call psych to reeval and give the discharge plan 33-year-old male with PMH ADHD, ORIN, alcohol use disorder presenting after taking 3 tablets of OxyContin this morning. Currently HDS, afebrile and satting well on RA.       #Benzodiazepine withdrawal  #Hx of alcohol abuse  #ORIN, ADHD  - ran out of klonopin; says he took 3 oxycontin, not in an attempt to hurt himself but for anxiety b/c he says the klonopin doesn't help  - CIWA  of 1 this AM.   - seen by behavioral health, made recommendations regarding medications  - no reported hx of seizures by pts  and pts mother  - would continue outpatient naltrexone 50mg daily dose  - librium taper  - thiamine/folate/ multivitamin  - keep K>4, Mg>2  - tox  - CATCH/addiction medicine consult ordered and following  - pt receives medications from [facility] ([phone])      - Psych c/s          Recommend Atarax PO 50 mg Q6H PRN for breakthrough anxiety/insomnia          Patient can receive Zyprexa PO 5 mg Q8H for agitation. Only if patient is at severe risk of harm to self or others and not amendable to verbal redirection or PO medication, can                     escalate to Haldol 5 mg IM; if IM formulation used please order repeat EKG to ensure qtc <500ms          As patient is on Benzodiazapine taper, recommend avoiding Zyprexa IM for episodes of acute agitation          Recommend continuing Librium taper          Recommend Atarax PO 50 mg Q6H PRN for breakthrough anxiety/insomnia      #Pruritis, likely contact dermatitis  - says he may have come into contact with poison ivy while he went messing for the past 2 days  - well demarcated erythema on neck extending to clavicles, dry  - no fever, wbc  - calamine lotion    Psych has cleared him for discharge   Pt's Mother does not feel that he can be discharge as he is not safe outside  Pt wants to go to shelter  Will call psych to reeval and give the discharge plan 33-year-old male with PMH ADHD, ORIN, alcohol use disorder presenting after taking 3 tablets of OxyContin this morning. Currently HDS, afebrile and satting well on RA.       #Benzodiazepine withdrawal  #Hx of alcohol abuse  #ORIN, ADHD  - ran out of klonopin; says he took 3 oxycontin, not in an attempt to hurt himself but for anxiety b/c he says the klonopin doesn't help  - CIWA  of 1 this AM.   - seen by behavioral health, made recommendations regarding medications  - no reported hx of seizures by pts  and pts mother  - would continue outpatient naltrexone 50mg daily dose  - librium taper  - thiamine/folate/ multivitamin  - keep K>4, Mg>2  - tox  - CATCH/addiction medicine consult ordered and following  - pt receives medications from [facility] ([phone])      - Psych c/s          Recommend Atarax PO 50 mg Q6H PRN for breakthrough anxiety/insomnia          Patient can receive Zyprexa PO 5 mg Q8H for agitation. Only if patient is at severe risk of harm to self or others and not amendable to verbal redirection or PO medication, can                     escalate to Haldol 5 mg IM; if IM formulation used please order repeat EKG to ensure qtc <500ms          As patient is on Benzodiazapine taper, recommend avoiding Zyprexa IM for episodes of acute agitation          Recommend continuing Librium taper          Recommend Atarax PO 50 mg Q6H PRN for breakthrough anxiety/insomnia      #Pruritis, likely contact dermatitis  - says he may have come into contact with poison ivy   - well demarcated erythema on neck extending to clavicles, dry  - no fever, wbc  - calamine lotion    Psych follow up done today and they  have  cleared him for discharge  Pt wants to go to shelter and has appointment with his counsellor   discharge today   33-year-old male with PMH ADHD, ORIN, alcohol use disorder presenting after taking 3 tablets of OxyContin this morning. Currently HDS, afebrile and satting well on RA.       #Benzodiazepine withdrawal  #Hx of alcohol abuse  #ORIN, ADHD  - ran out of klonopin; says he took 3 oxycontin, not in an attempt to hurt himself but for anxiety b/c he says the klonopin doesn't help  - CIWA  of 1 this AM.   - seen by behavioral health, made recommendations regarding medications  - no reported hx of seizures by pts  and pts mother  - would continue outpatient naltrexone 50mg daily dose  - librium taper  - thiamine/folate/ multivitamin  - keep K>4, Mg>2  - tox  - CATCH/addiction medicine consult ordered and following  - pt receives medications from [facility] ([phone])      - Psych c/s          Recommend Atarax PO 50 mg Q6H PRN for breakthrough anxiety/insomnia          Patient can receive Zyprexa PO 5 mg Q8H for agitation. Only if patient is at severe risk of harm to self or others and not amendable to verbal redirection or PO medication, can                     escalate to Haldol 5 mg IM; if IM formulation used please order repeat EKG to ensure qtc <500ms          As patient is on Benzodiazapine taper, recommend avoiding Zyprexa IM for episodes of acute agitation          Recommend continuing Librium taper          Recommend Atarax PO 50 mg Q6H PRN for breakthrough anxiety/insomnia      #Pruritis, likely contact dermatitis  - says he may have come into contact with poison ivy   - well demarcated erythema on neck extending to clavicles, dry  - no fever, wbc  - calamine lotion    Psych follow up done today and they  have  cleared him for discharge  Pt wants to go to shelter and has appointment with his counsellor   discharge today

## 2024-01-03 NOTE — BH CONSULTATION LIAISON PROGRESS NOTE - NSBHATTESTBILLING_PSY_A_CORE
74131-[id number] OBS or IP - low complexity OR 25-34 mins [id number] OBS or IP - low complexity OR 25-34 mins

## 2024-01-03 NOTE — BH CONSULTATION LIAISON PROGRESS NOTE - NSBHCHARTREVIEWVS_PSY_A_CORE FT
Vital Signs Last 24 Hrs  T(C): 36.4 (30 Dec 2023 22:01), Max: 36.4 (30 Dec 2023 22:01)  T(F): 97.6 (30 Dec 2023 22:01), Max: 97.6 (30 Dec 2023 22:01)  HR: 51 (31 Dec 2023 05:29) (51 - 62)  BP: 141/69 (31 Dec 2023 05:29) (141/69 - 147/67)  BP(mean): 97 (30 Dec 2023 22:01) (97 - 97)  RR: 18 (31 Dec 2023 05:29) (18 - 18)  SpO2: 98% (30 Dec 2023 22:01) (98% - 98%)    Parameters below as of 30 Dec 2023 22:01  Patient On (Oxygen Delivery Method): room air    
Vital Signs Last 24 Hrs  T(C): 36.1 (03 Jan 2024 05:00), Max: 36.1 (03 Jan 2024 05:00)  T(F): 96.9 (03 Jan 2024 05:00), Max: 96.9 (03 Jan 2024 05:00)  HR: 82 (03 Jan 2024 05:00) (80 - 82)  BP: 135/95 (03 Jan 2024 05:00) (126/84 - 135/95)  BP(mean): --  RR: 18 (03 Jan 2024 05:00) (18 - 18)  SpO2: --

## 2024-01-03 NOTE — PROGRESS NOTE ADULT - SUBJECTIVE AND OBJECTIVE BOX
24H events:    Patient is a 33y old Male who presents with a chief complaint of benzo withdrawal (02 Jan 2024 12:06)    Primary diagnosis of Benzodiazepine withdrawal without complication    Today is hospital day 4d. This morning patient was seen and examined at bedside, resting comfortably in bed.    No acute or major events overnight.    Code Status: Full code      PAST MEDICAL & SURGICAL HISTORY  Anxiety    ADHD    Anxiety    Alcohol abuse    Depression    Asthma    History of corneal transplant      SOCIAL HISTORY:  Social History:  Hx of alcohol abuse. Currently smokes marijuana (30 Dec 2023 16:57)      ALLERGIES:  penicillins (Unknown)    MEDICATIONS:  STANDING MEDICATIONS  acetaminophen     Tablet .. 650 milliGRAM(s) Oral once  chlordiazePOXIDE   Oral   chlordiazePOXIDE 50 milliGRAM(s) Oral every 24 hours  enoxaparin Injectable 40 milliGRAM(s) SubCutaneous every 24 hours  folic acid 1 milliGRAM(s) Oral daily  influenza   Vaccine 0.5 milliLiter(s) IntraMuscular once  multivitamin 1 Tablet(s) Oral daily  naltrexone 50 milliGRAM(s) Oral daily  OLANZapine 5 milliGRAM(s) Oral daily  pantoprazole    Tablet 40 milliGRAM(s) Oral before breakfast  thiamine 100 milliGRAM(s) Oral daily    PRN MEDICATIONS  calamine/zinc oxide Lotion 1 Application(s) Topical two times a day PRN  hydrOXYzine hydrochloride 50 milliGRAM(s) Oral every 6 hours PRN    VITALS:   T(F): 96.9  HR: 82  BP: 135/95  RR: 18  SpO2: --    PHYSICAL EXAM:   GENERAL: NAD, lying in bed comfortably  HEAD:  Atraumatic, Normocephalic  EYES: EOMI, sclera clear  CHEST/LUNG: Clear to auscultation anteriorly bilaterally; No rales, rhonchi, wheezing, or rubs. Unlabored respirations  HEART: Regular rate and rhythm  ABDOMEN: Soft, nontender, nondistended  EXTREMITIES: No clubbing, cyanosis, or edema  NERVOUS SYSTEM:  A&Ox3, no noted facial droop. Moving all extremities w/o difficulty.   SKIN: No rashes or lesions to b/l forearm, face.     ( - ) Indwelling Morales Catheter:   Date insterted:    Reason (  ) Critical illness     (  ) urinary retention    (  ) Accurate Ins/Outs Monitoring     (  ) CMO patient    ( - ) Central Line:   Date inserted:  Location: (  ) Right IJ     (  ) Left IJ     (  ) Right Fem     (  ) Left Fem    ( - ) SPC        ( - ) pigtail       ( - ) PEG tube       ( - ) colostomy       ( - ) jejunostomy  ( - ) U-Dall    LABS:                        RADIOLOGY:

## 2024-01-03 NOTE — PROGRESS NOTE ADULT - ASSESSMENT
33-year-old male with PMH ADHD, ORIN, alcohol use disorder presenting after taking 3 tablets of OxyContin this morning. Currently HDS, afebrile and satting well on RA.     #Benzodiazepine withdrawal  #Hx of alcohol abuse  #ORIN, ADHD  - continue outpatient naltrexone 50mg daily dose  - librium taper  - thiamine/folate/ multivitamin  - keep K>4, Mg>2  - tox  - CATCH/addiction medicine consult ordered and following  - unclear why pt on prazosin (pt and pts mother doesn't know why)  - pt receives medications from [facility] ([phone])  - Knife recovered from pt , addiction medicine aware  - pt remains 1:1  - Psych c/s          Recommend Atarax PO 50 mg Q6H PRN for breakthrough anxiety/insomnia          Patient can receive Zyprexa PO 5 mg Q8H for agitation. Only if patient is at severe risk of harm to self or others and not amendable to verbal redirection or PO medication, can                     escalate to Haldol 5 mg IM; if IM formulation used please order repeat EKG to ensure qtc <500ms          As patient is on Benzodiazapine taper, recommend avoiding Zyprexa IM for episodes of acute agitation          Recommend continuing Librium taper          Recommend Atarax PO 50 mg Q6H PRN for breakthrough anxiety/insomnia      #Pruritis, likely contact dermatitis  - says he may have come into contact with poison ivy while he went messing for the past 2 days  - well demarcated erythema on neck extending to clavicles, dry  - no fever, wbc  - calamine lotion     33-year-old male with PMH ADHD, ORIN, alcohol use disorder presenting after taking 3 tablets of OxyContin this morning. Currently HDS, afebrile and satting well on RA.     #Benzodiazepine withdrawal  #Hx of alcohol abuse  #ORIN, ADHD  - continue outpatient naltrexone 50mg daily dose  - librium taper  - thiamine/folate/ multivitamin  - keep K>4, Mg>2  - tox  - CATCH/addiction medicine consult ordered and following  - unclear why pt on prazosin (pt and pts mother doesn't know why)  - pt receives medications from  ([phone])  - Knife recovered from pt , addiction medicine aware  - pt remains 1:1  - Psych c/s          Recommend Atarax PO 50 mg Q6H PRN for breakthrough anxiety/insomnia          Patient can receive Zyprexa PO 5 mg Q8H for agitation. Only if patient is at severe risk of harm to self or others and not amendable to verbal redirection or PO medication, can                     escalate to Haldol 5 mg IM; if IM formulation used please order repeat EKG to ensure qtc <500ms          As patient is on Benzodiazapine taper, recommend avoiding Zyprexa IM for episodes of acute agitation          Recommend continuing Librium taper          Recommend Atarax PO 50 mg Q6H PRN for breakthrough anxiety/insomnia      #Pruritis, likely contact dermatitis  - says he may have come into contact with poison ivy while he went messing for the past 2 days  - well demarcated erythema on neck extending to clavicles, dry  - no fever, wbc  - calamine lotion

## 2024-01-05 LAB
ALPRAZOLAM RESULT, UR: NEGATIVE — SIGNIFICANT CHANGE UP
ALPRAZOLAM RESULT, UR: NEGATIVE — SIGNIFICANT CHANGE UP
AMPHET UR-MCNC: NEGATIVE NG/ML — SIGNIFICANT CHANGE UP
AMPHET UR-MCNC: NEGATIVE NG/ML — SIGNIFICANT CHANGE UP
BARBITURATES UR QL SCN: NEGATIVE NG/ML — SIGNIFICANT CHANGE UP
BARBITURATES UR QL SCN: NEGATIVE NG/ML — SIGNIFICANT CHANGE UP
BARBITURATES UR-MCNC: NEGATIVE NG/ML — SIGNIFICANT CHANGE UP
BARBITURATES UR-MCNC: NEGATIVE NG/ML — SIGNIFICANT CHANGE UP
BENZODIAZ UR QL SCN: NEGATIVE NG/ML — SIGNIFICANT CHANGE UP
BENZODIAZ UR QL SCN: NEGATIVE NG/ML — SIGNIFICANT CHANGE UP
BENZODIAZ UR-MCNC: SIGNIFICANT CHANGE UP NG/ML
BENZODIAZ UR-MCNC: SIGNIFICANT CHANGE UP NG/ML
BENZODIAZEPINES RESULT, UR: NEGATIVE NG/ML — SIGNIFICANT CHANGE UP
BENZODIAZEPINES RESULT, UR: NEGATIVE NG/ML — SIGNIFICANT CHANGE UP
CANNABINOID RESULT, UR: POSITIVE
CANNABINOID RESULT, UR: POSITIVE
CANNABINOIDS UR QL SCN: POSITIVE
CANNABINOIDS UR QL SCN: POSITIVE
CLONAZEPAM RESULT, UR: NEGATIVE — SIGNIFICANT CHANGE UP
CLONAZEPAM RESULT, UR: NEGATIVE — SIGNIFICANT CHANGE UP
COCAINE METAB.OTHER UR-MCNC: NEGATIVE NG/ML — SIGNIFICANT CHANGE UP
COCAINE METAB.OTHER UR-MCNC: NEGATIVE NG/ML — SIGNIFICANT CHANGE UP
CREATININE, URINE THERAPEUTIC: 202 MG/DL — SIGNIFICANT CHANGE UP (ref 20–300)
CREATININE, URINE THERAPEUTIC: 202 MG/DL — SIGNIFICANT CHANGE UP (ref 20–300)
FENTANYL RESULT, UR: NEGATIVE NG/ML — SIGNIFICANT CHANGE UP
FENTANYL RESULT, UR: NEGATIVE NG/ML — SIGNIFICANT CHANGE UP
FENTANYL UR QL SCN: NEGATIVE NG/ML — SIGNIFICANT CHANGE UP
FENTANYL UR QL SCN: NEGATIVE NG/ML — SIGNIFICANT CHANGE UP
FLURAZEPAM RESULT, UR: NEGATIVE — SIGNIFICANT CHANGE UP
FLURAZEPAM RESULT, UR: NEGATIVE — SIGNIFICANT CHANGE UP
LORAZEPAM RESULT, UR: NEGATIVE — SIGNIFICANT CHANGE UP
LORAZEPAM RESULT, UR: NEGATIVE — SIGNIFICANT CHANGE UP
LORAZEPAM UR CFM-MCNC: NEGATIVE — SIGNIFICANT CHANGE UP
LORAZEPAM UR CFM-MCNC: NEGATIVE — SIGNIFICANT CHANGE UP
Lab: SIGNIFICANT CHANGE UP
Lab: SIGNIFICANT CHANGE UP
METHADONE UR-MCNC: NEGATIVE NG/ML — SIGNIFICANT CHANGE UP
METHADONE UR-MCNC: NEGATIVE NG/ML — SIGNIFICANT CHANGE UP
MIDAZOLAM RESULT, UR: NEGATIVE — SIGNIFICANT CHANGE UP
MIDAZOLAM RESULT, UR: NEGATIVE — SIGNIFICANT CHANGE UP
NORDIAZEPAM RESULT, UR: NEGATIVE — SIGNIFICANT CHANGE UP
NORDIAZEPAM RESULT, UR: NEGATIVE — SIGNIFICANT CHANGE UP
OPIATES UR-MCNC: NEGATIVE NG/ML — SIGNIFICANT CHANGE UP
OPIATES UR-MCNC: NEGATIVE NG/ML — SIGNIFICANT CHANGE UP
OXAZEPAM RESULT, UR: NEGATIVE — SIGNIFICANT CHANGE UP
OXAZEPAM RESULT, UR: NEGATIVE — SIGNIFICANT CHANGE UP
OXAZEPAM UR QL SCN: NEGATIVE — SIGNIFICANT CHANGE UP
OXAZEPAM UR QL SCN: NEGATIVE — SIGNIFICANT CHANGE UP
OXYCODONE UR QL SCN: NEGATIVE NG/ML — SIGNIFICANT CHANGE UP
OXYCODONE UR QL SCN: NEGATIVE NG/ML — SIGNIFICANT CHANGE UP
PCP UR-MCNC: NEGATIVE NG/ML — SIGNIFICANT CHANGE UP
PCP UR-MCNC: NEGATIVE NG/ML — SIGNIFICANT CHANGE UP
PH, URINE RESULT: 5 — SIGNIFICANT CHANGE UP (ref 4.5–8.9)
PH, URINE RESULT: 5 — SIGNIFICANT CHANGE UP (ref 4.5–8.9)
TEMAZEPAM RESULT, UR: NEGATIVE — SIGNIFICANT CHANGE UP
TEMAZEPAM RESULT, UR: NEGATIVE — SIGNIFICANT CHANGE UP
THC UR CFM-MCNC: >300 NG/ML — SIGNIFICANT CHANGE UP
THC UR CFM-MCNC: >300 NG/ML — SIGNIFICANT CHANGE UP
THC UR QL: SIGNIFICANT CHANGE UP NG/ML
THC UR QL: SIGNIFICANT CHANGE UP NG/ML
TRIAZOLAM RESULT, UR: NEGATIVE — SIGNIFICANT CHANGE UP
TRIAZOLAM RESULT, UR: NEGATIVE — SIGNIFICANT CHANGE UP

## 2024-01-09 DIAGNOSIS — F13.939 SEDATIVE, HYPNOTIC OR ANXIOLYTIC USE, UNSPECIFIED WITH WITHDRAWAL, UNSPECIFIED: ICD-10-CM

## 2024-01-09 DIAGNOSIS — L29.9 PRURITUS, UNSPECIFIED: ICD-10-CM

## 2024-01-09 DIAGNOSIS — F41.1 GENERALIZED ANXIETY DISORDER: ICD-10-CM

## 2024-01-09 DIAGNOSIS — L23.7 ALLERGIC CONTACT DERMATITIS DUE TO PLANTS, EXCEPT FOOD: ICD-10-CM

## 2024-01-09 DIAGNOSIS — T40.2X1A POISONING BY OTHER OPIOIDS, ACCIDENTAL (UNINTENTIONAL), INITIAL ENCOUNTER: ICD-10-CM

## 2024-01-09 DIAGNOSIS — F10.10 ALCOHOL ABUSE, UNCOMPLICATED: ICD-10-CM

## 2024-01-09 DIAGNOSIS — F12.90 CANNABIS USE, UNSPECIFIED, UNCOMPLICATED: ICD-10-CM
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
## 2024-01-09 DIAGNOSIS — F90.9 ATTENTION-DEFICIT HYPERACTIVITY DISORDER, UNSPECIFIED TYPE: ICD-10-CM

## 2024-01-09 DIAGNOSIS — F22 DELUSIONAL DISORDERS: ICD-10-CM

## 2024-01-10 ENCOUNTER — EMERGENCY (EMERGENCY)
Facility: HOSPITAL | Age: 34
LOS: 0 days | Discharge: HOME | End: 2024-01-10
Attending: STUDENT IN AN ORGANIZED HEALTH CARE EDUCATION/TRAINING PROGRAM
Payer: MEDICAID

## 2024-01-10 VITALS
RESPIRATION RATE: 18 BRPM | DIASTOLIC BLOOD PRESSURE: 92 MMHG | HEART RATE: 81 BPM | OXYGEN SATURATION: 100 % | SYSTOLIC BLOOD PRESSURE: 137 MMHG

## 2024-01-10 VITALS
DIASTOLIC BLOOD PRESSURE: 77 MMHG | HEIGHT: 68 IN | OXYGEN SATURATION: 100 % | HEART RATE: 96 BPM | SYSTOLIC BLOOD PRESSURE: 139 MMHG | TEMPERATURE: 96 F | RESPIRATION RATE: 20 BRPM

## 2024-01-10 DIAGNOSIS — M79.661 PAIN IN RIGHT LOWER LEG: ICD-10-CM

## 2024-01-10 DIAGNOSIS — Z94.7 CORNEAL TRANSPLANT STATUS: Chronic | ICD-10-CM

## 2024-01-10 DIAGNOSIS — M79.662 PAIN IN LEFT LOWER LEG: ICD-10-CM

## 2024-01-10 DIAGNOSIS — F41.9 ANXIETY DISORDER, UNSPECIFIED: ICD-10-CM
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
## 2024-01-10 DIAGNOSIS — M25.561 PAIN IN RIGHT KNEE: ICD-10-CM

## 2024-01-10 DIAGNOSIS — M25.562 PAIN IN LEFT KNEE: ICD-10-CM

## 2024-01-10 DIAGNOSIS — M25.572 PAIN IN LEFT ANKLE AND JOINTS OF LEFT FOOT: ICD-10-CM

## 2024-01-10 DIAGNOSIS — M25.571 PAIN IN RIGHT ANKLE AND JOINTS OF RIGHT FOOT: ICD-10-CM
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
## 2024-01-10 DIAGNOSIS — Z88.0 ALLERGY STATUS TO PENICILLIN: ICD-10-CM

## 2024-01-10 PROCEDURE — 73610 X-RAY EXAM OF ANKLE: CPT | Mod: 50

## 2024-01-10 PROCEDURE — 71046 X-RAY EXAM CHEST 2 VIEWS: CPT

## 2024-01-10 PROCEDURE — 99284 EMERGENCY DEPT VISIT MOD MDM: CPT | Mod: 25

## 2024-01-10 PROCEDURE — 73630 X-RAY EXAM OF FOOT: CPT | Mod: 26,50

## 2024-01-10 PROCEDURE — 72170 X-RAY EXAM OF PELVIS: CPT

## 2024-01-10 PROCEDURE — 73630 X-RAY EXAM OF FOOT: CPT | Mod: 50

## 2024-01-10 PROCEDURE — 73564 X-RAY EXAM KNEE 4 OR MORE: CPT | Mod: 50

## 2024-01-10 PROCEDURE — 73564 X-RAY EXAM KNEE 4 OR MORE: CPT | Mod: 26,50

## 2024-01-10 PROCEDURE — 99284 EMERGENCY DEPT VISIT MOD MDM: CPT

## 2024-01-10 PROCEDURE — 73610 X-RAY EXAM OF ANKLE: CPT | Mod: 26,50

## 2024-01-10 PROCEDURE — 71046 X-RAY EXAM CHEST 2 VIEWS: CPT | Mod: 26

## 2024-01-10 PROCEDURE — 72170 X-RAY EXAM OF PELVIS: CPT | Mod: 26

## 2024-01-10 RX ORDER — IBUPROFEN 200 MG
600 TABLET ORAL ONCE
Refills: 0 | Status: COMPLETED | OUTPATIENT
Start: 2024-01-10 | End: 2024-01-10

## 2024-01-10 RX ADMIN — Medication 600 MILLIGRAM(S): at 10:35
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
## 2024-01-10 NOTE — ED PROVIDER NOTE - PHYSICAL EXAMINATION
VITAL SIGNS: I have reviewed nursing notes and confirm.  CONSTITUTIONAL: Well-developed; well-nourished; in no acute distress.   SKIN:  abrasions to right ankle  HEAD: Normocephalic; atraumatic.  EYES: conjunctiva and sclera clear.  ENT: No nasal discharge; airway clear.  CARD: S1, S2 normal; no murmurs, gallops, or rubs. Regular rate and rhythm.   RESP: No wheezes, rales or rhonchi.  ABD: Normal bowel sounds; soft; non-distended; non-tender; no hepatosplenomegaly.  EXT: Normal ROM.  No clubbing, cyanosis or edema.   NEURO: Alert, oriented, grossly unremarkable

## 2024-01-10 NOTE — ED ADULT NURSE NOTE - NSFALLUNIVINTERV_ED_ALL_ED
Bed/Stretcher in lowest position, wheels locked, appropriate side rails in place/Call bell, personal items and telephone in reach/Instruct patient to call for assistance before getting out of bed/chair/stretcher/Non-slip footwear applied when patient is off stretcher/[place] to call system/Physically safe environment - no spills, clutter or unnecessary equipment/Purposeful proactive rounding/Room/bathroom lighting operational, light cord in reach Bed/Stretcher in lowest position, wheels locked, appropriate side rails in place/Call bell, personal items and telephone in reach/Instruct patient to call for assistance before getting out of bed/chair/stretcher/Non-slip footwear applied when patient is off stretcher/[place] to call system/Physically safe environment - no spills, clutter or unnecessary equipment/Purposeful proactive rounding/Room/bathroom lighting operational, light cord in reach

## 2024-01-10 NOTE — ED ADULT NURSE REASSESSMENT NOTE - NS ED NURSE REASSESS COMMENT FT1
Patient's pharmacy is [facility] [address] [id number] Patient's pharmacy is [facility] [address] [id number]

## 2024-01-10 NOTE — ED PROVIDER NOTE - PATIENT PORTAL LINK FT
You can access the FollowMyHealth Patient Portal offered by [facility] by registering at the following website: [email or web address]. By joining [facility]’s FollowMyHealth portal, you will also be able to view your health information using other applications (apps) compatible with our system. You can access the FollowMyHealth Patient Portal offered by [facility] by registering at the following website: [email or web address]. By joining [facility]’s FollowMyHealth portal, you will also be able to view your health information using other applications (apps) compatible with our system.
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
## 2024-01-10 NOTE — ED PROVIDER NOTE - CARE PROVIDER_API CALL
Vitaliy Carrillo  Orthopaedic Surgery  [address] [place] [address]  Phone: [phone]  Fax: [phone]  Follow Up Time:    Vitaliy Carrillo  Orthopaedic Surgery  [address] [place] [address]  Phone: [phone]  Fax: [phone]  Follow Up Time:

## 2024-08-06 PROCEDURE — 90792 PSYCH DIAG EVAL W/MED SRVCS: CPT | Mod: 95

## 2024-10-01 NOTE — ED ADULT TRIAGE NOTE - BP NONINVASIVE SYSTOLIC (MM HG)
Relayed results to patient as per provider message. Patient expressed understanding and did not have any further questions.                                                                 [phone]

## 2025-03-18 NOTE — PATIENT PROFILE BEHAVIORAL HEALTH - MEDICATION PUMPS, PROFILE
[facility] provides services at a reduced cost to those who are determined to be eligible through [facility]’s financial assistance program. Information regarding [facility]’s financial assistance program can be found by going to [email or web address] or by calling [phone].
none